# Patient Record
Sex: MALE | Race: WHITE | NOT HISPANIC OR LATINO | Employment: OTHER | URBAN - METROPOLITAN AREA
[De-identification: names, ages, dates, MRNs, and addresses within clinical notes are randomized per-mention and may not be internally consistent; named-entity substitution may affect disease eponyms.]

---

## 2017-04-05 ENCOUNTER — APPOINTMENT (OUTPATIENT)
Dept: LAB | Facility: HOSPITAL | Age: 77
End: 2017-04-05

## 2017-04-12 ENCOUNTER — APPOINTMENT (OUTPATIENT)
Dept: LAB | Facility: HOSPITAL | Age: 77
End: 2017-04-12

## 2017-05-18 ENCOUNTER — OFFICE VISIT (OUTPATIENT)
Dept: FAMILY MEDICINE CLINIC | Facility: CLINIC | Age: 77
End: 2017-05-18

## 2017-05-18 VITALS
WEIGHT: 146.4 LBS | HEART RATE: 72 BPM | HEIGHT: 69 IN | SYSTOLIC BLOOD PRESSURE: 110 MMHG | OXYGEN SATURATION: 93 % | TEMPERATURE: 98.8 F | BODY MASS INDEX: 21.68 KG/M2 | DIASTOLIC BLOOD PRESSURE: 70 MMHG

## 2017-05-18 DIAGNOSIS — R20.2 PARESTHESIA OF LOWER EXTREMITY: Primary | ICD-10-CM

## 2017-05-18 DIAGNOSIS — H61.23 BILATERAL IMPACTED CERUMEN: ICD-10-CM

## 2017-05-18 DIAGNOSIS — Z23 IMMUNIZATION DUE: ICD-10-CM

## 2017-05-18 LAB
ANION GAP SERPL CALCULATED.3IONS-SCNC: 13.2 MMOL/L
BUN BLD-MCNC: 17 MG/DL (ref 8–23)
BUN/CREAT SERPL: 20 (ref 7–25)
CALCIUM SPEC-SCNC: 10 MG/DL (ref 8.6–10.5)
CHLORIDE SERPL-SCNC: 97 MMOL/L (ref 98–107)
CO2 SERPL-SCNC: 25.8 MMOL/L (ref 22–29)
CREAT BLD-MCNC: 0.85 MG/DL (ref 0.76–1.27)
GFR SERPL CREATININE-BSD FRML MDRD: 88 ML/MIN/1.73
GLUCOSE BLD-MCNC: 86 MG/DL (ref 65–99)
POTASSIUM BLD-SCNC: 5 MMOL/L (ref 3.5–5.2)
SODIUM BLD-SCNC: 136 MMOL/L (ref 136–145)
TSH SERPL DL<=0.05 MIU/L-ACNC: 1.72 MIU/ML (ref 0.27–4.2)
VIT B12 BLD-MCNC: 461 PG/ML (ref 211–946)

## 2017-05-18 PROCEDURE — 82607 VITAMIN B-12: CPT | Performed by: INTERNAL MEDICINE

## 2017-05-18 PROCEDURE — 80048 BASIC METABOLIC PNL TOTAL CA: CPT | Performed by: INTERNAL MEDICINE

## 2017-05-18 PROCEDURE — 99213 OFFICE O/P EST LOW 20 MIN: CPT | Performed by: INTERNAL MEDICINE

## 2017-05-18 PROCEDURE — 69209 REMOVE IMPACTED EAR WAX UNI: CPT | Performed by: INTERNAL MEDICINE

## 2017-05-18 PROCEDURE — 84443 ASSAY THYROID STIM HORMONE: CPT | Performed by: INTERNAL MEDICINE

## 2017-05-18 RX ORDER — GABAPENTIN 100 MG/1
100 CAPSULE ORAL 3 TIMES DAILY
Qty: 90 CAPSULE | Refills: 0 | Status: SHIPPED | OUTPATIENT
Start: 2017-05-18 | End: 2017-06-14 | Stop reason: SDUPTHER

## 2017-05-19 ENCOUNTER — TELEPHONE (OUTPATIENT)
Dept: FAMILY MEDICINE CLINIC | Facility: CLINIC | Age: 77
End: 2017-05-19

## 2017-06-15 RX ORDER — GABAPENTIN 100 MG/1
CAPSULE ORAL
Qty: 90 CAPSULE | Refills: 0 | Status: SHIPPED | OUTPATIENT
Start: 2017-06-15 | End: 2018-07-12

## 2017-09-19 ENCOUNTER — TELEPHONE (OUTPATIENT)
Dept: FAMILY MEDICINE CLINIC | Facility: CLINIC | Age: 77
End: 2017-09-19

## 2017-09-19 RX ORDER — PREGABALIN 50 MG/1
50 CAPSULE ORAL 3 TIMES DAILY
Qty: 42 CAPSULE | Refills: 0 | Status: SHIPPED | OUTPATIENT
Start: 2017-09-19 | End: 2017-10-02 | Stop reason: SDUPTHER

## 2017-09-19 NOTE — TELEPHONE ENCOUNTER
Patient here accompanying wife not being seen today formally did get a trial of Neurontin for his peripheral neuropathy mainly lower extremities he did not do anything S4s factors goes has not tried Lyrica we have [facility] prescription for quantity 42 with coupon making free trial for patient with a Navdeep and contract.  This works we'll initiate prescription with a formal urine drug screen also.

## 2018-07-12 ENCOUNTER — TELEPHONE (OUTPATIENT)
Dept: FAMILY MEDICINE CLINIC | Facility: CLINIC | Age: 78
End: 2018-07-12

## 2018-07-12 ENCOUNTER — OFFICE VISIT (OUTPATIENT)
Dept: FAMILY MEDICINE CLINIC | Facility: CLINIC | Age: 78
End: 2018-07-12

## 2018-07-12 VITALS
WEIGHT: 155.2 LBS | TEMPERATURE: 97.9 F | OXYGEN SATURATION: 97 % | HEART RATE: 71 BPM | DIASTOLIC BLOOD PRESSURE: 64 MMHG | HEIGHT: 69 IN | BODY MASS INDEX: 22.99 KG/M2 | SYSTOLIC BLOOD PRESSURE: 118 MMHG | RESPIRATION RATE: 16 BRPM

## 2018-07-12 DIAGNOSIS — R06.02 SOB (SHORTNESS OF BREATH): Primary | ICD-10-CM

## 2018-07-12 DIAGNOSIS — F41.9 ANXIETY: ICD-10-CM

## 2018-07-12 LAB
ALBUMIN SERPL-MCNC: 4.5 G/DL (ref 3.5–5.2)
ALBUMIN/GLOB SERPL: 1.1 G/DL
ALP SERPL-CCNC: 85 U/L (ref 39–117)
ALT SERPL W P-5'-P-CCNC: 10 U/L (ref 1–41)
ANION GAP SERPL CALCULATED.3IONS-SCNC: 13.7 MMOL/L
AST SERPL-CCNC: 9 U/L (ref 1–40)
BILIRUB SERPL-MCNC: 0.6 MG/DL (ref 0.1–1.2)
BUN BLD-MCNC: 15 MG/DL (ref 8–23)
BUN/CREAT SERPL: 16.3 (ref 7–25)
CALCIUM SPEC-SCNC: 9.3 MG/DL (ref 8.6–10.5)
CHLORIDE SERPL-SCNC: 102 MMOL/L (ref 98–107)
CO2 SERPL-SCNC: 26.3 MMOL/L (ref 22–29)
CREAT BLD-MCNC: 0.92 MG/DL (ref 0.76–1.27)
ERYTHROCYTE [DISTWIDTH] IN BLOOD BY AUTOMATED COUNT: 13.9 % (ref 4.5–15)
GFR SERPL CREATININE-BSD FRML MDRD: 80 ML/MIN/1.73
GLOBULIN UR ELPH-MCNC: 4.2 GM/DL
GLUCOSE BLD-MCNC: 84 MG/DL (ref 65–99)
HCT VFR BLD AUTO: 48.3 % (ref 35–60)
HGB BLD-MCNC: 16.3 G/DL (ref 13.5–18)
LYMPHOCYTES # BLD AUTO: 2.8 10*3/MM3 (ref 1.2–3.4)
LYMPHOCYTES NFR BLD AUTO: 36.9 % (ref 21–51)
MCH RBC QN AUTO: 30.3 PG (ref 26.1–33.1)
MCHC RBC AUTO-ENTMCNC: 33.9 G/DL (ref 33–37)
MCV RBC AUTO: 89.5 FL (ref 80–99)
MONOCYTES # BLD AUTO: 0.7 10*3/MM3 (ref 0.1–0.6)
MONOCYTES NFR BLD AUTO: 9.4 % (ref 2–9)
NEUTROPHILS # BLD AUTO: 4.1 10*3/MM3 (ref 1.4–6.5)
NEUTROPHILS NFR BLD AUTO: 53.7 % (ref 42–75)
PLATELET # BLD AUTO: 278 10*3/MM3 (ref 150–450)
PMV BLD AUTO: 7.1 FL (ref 7.1–10.5)
POTASSIUM BLD-SCNC: 4.7 MMOL/L (ref 3.5–5.2)
PROT SERPL-MCNC: 8.7 G/DL (ref 6–8.5)
RBC # BLD AUTO: 5.39 10*6/MM3 (ref 4–6)
SODIUM BLD-SCNC: 142 MMOL/L (ref 136–145)
TSH SERPL DL<=0.05 MIU/L-ACNC: 1.6 MIU/ML (ref 0.27–4.2)
WBC NRBC COR # BLD: 7.6 10*3/MM3 (ref 4.5–10)

## 2018-07-12 PROCEDURE — 80053 COMPREHEN METABOLIC PANEL: CPT | Performed by: NURSE PRACTITIONER

## 2018-07-12 PROCEDURE — 84443 ASSAY THYROID STIM HORMONE: CPT | Performed by: NURSE PRACTITIONER

## 2018-07-12 PROCEDURE — 36415 COLL VENOUS BLD VENIPUNCTURE: CPT | Performed by: NURSE PRACTITIONER

## 2018-07-12 PROCEDURE — 93000 ELECTROCARDIOGRAM COMPLETE: CPT | Performed by: NURSE PRACTITIONER

## 2018-07-12 PROCEDURE — 99213 OFFICE O/P EST LOW 20 MIN: CPT | Performed by: NURSE PRACTITIONER

## 2018-07-12 PROCEDURE — 85025 COMPLETE CBC W/AUTO DIFF WBC: CPT | Performed by: NURSE PRACTITIONER

## 2018-07-12 RX ORDER — HYDROXYZINE HYDROCHLORIDE 25 MG/1
25 TABLET, FILM COATED ORAL EVERY 8 HOURS PRN
Qty: 30 TABLET | Refills: 1 | Status: SHIPPED | OUTPATIENT
Start: 2018-07-12 | End: 2018-08-01 | Stop reason: SDUPTHER

## 2018-07-12 RX ORDER — FLUOXETINE 10 MG/1
10 TABLET, FILM COATED ORAL DAILY
Qty: 30 TABLET | Refills: 2 | Status: SHIPPED | OUTPATIENT
Start: 2018-07-12 | End: 2018-08-01

## 2018-07-12 NOTE — PATIENT INSTRUCTIONS
ekg today WNL>   start prozac 10mg daily. Prev tolerated.   Labs today will call with results.  Trial hydroxyzine 25mg every 8 hours as needed for anxiety. Educated about sedation.   If any worsening symptoms advised to go to the ER.   F/u if symptoms persist.   Increase fluid intake, get plenty of rest.   Patient agrees with plan of care and understands instructions. Call if worsening symptoms or any problems or concerns.

## 2018-07-12 NOTE — PROGRESS NOTES
Procedure     ECG 12 Lead  Date/Time: 7/12/2018 12:11 PM  Performed by: CARRIE JETT  Authorized by: CARRIE JETT   Comparison: compared with previous ECG from 10/6/2010  Comparison to previous ECG: nsr  Rhythm: sinus rhythm  Rate: normal  QRS axis: normal  Clinical impression: normal ECG

## 2018-07-12 NOTE — PROGRESS NOTES
Subjective   Jayden Marquez is a 78 y.o. male.     History of Present Illness   C/o SOA, started about 4-5 days ago, he thinks related to anxiety, states anxiety and SOA feeling is all the time, he states he feels SOA currently, feels like he can't take a deep breath, he denies CP, denies cardiac history, he does smoke 4-5 cigarettes per day. He denies cough, denies wheezing, he states he has been anxious, denies stress, he states he woke up feeling anxious and SOB 4-5 days ago, he states he has had this feeling before. He used to take prozac and xanax for anxiety, does not take anymore, stopped 2-3 years ago. 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 Over due for labs.     The following portions of the patient's history were reviewed and updated as appropriate: allergies, current medications, past family history, past medical history, past social history, past surgical history and problem list.    Review of Systems   Constitutional: Negative for chills, diaphoresis and fever.   HENT: Negative for congestion and sinus pressure.    Respiratory: Negative for cough, chest tightness, shortness of breath and wheezing.    Cardiovascular: Negative for chest pain, palpitations and leg swelling.   Musculoskeletal: Negative for arthralgias and myalgias.   Skin: Negative for pallor.   Neurological: Negative for dizziness, light-headedness and headache.   Psychiatric/Behavioral: Negative for agitation, sleep disturbance, suicidal ideas, depressed mood and stress. The patient is nervous/anxious.    All other systems reviewed and are negative.      Objective   Physical Exam   Constitutional: He is oriented to person, place, and time. He appears well-developed and well-nourished.   HENT:   Head: Normocephalic.   Eyes: Pupils are equal, round, and reactive to light.   Neck: Normal range of motion.   Cardiovascular: Normal rate, regular rhythm, normal heart sounds and intact distal pulses.    Pulses:       Radial pulses are 2+  on the right side, and 2+ on the left side.        Dorsalis pedis pulses are 2+ on the right side, and 2+ on the left side.        Posterior tibial pulses are 2+ on the right side, and 2+ on the left side.   Pulmonary/Chest: Effort normal and breath sounds normal. No respiratory distress. He has no decreased breath sounds. He has no wheezes. He has no rhonchi. He has no rales.   Musculoskeletal: Normal range of motion.   Lymphadenopathy:     He has no cervical adenopathy.   Neurological: He is alert and oriented to person, place, and time.   Skin: Skin is warm and dry.   Psychiatric: He has a normal mood and affect. His behavior is normal.   Nursing note and vitals reviewed.        Assessment/Plan   Jayden was seen today for shortness of breath and anxiety.    Diagnoses and all orders for this visit:    SOB (shortness of breath)  -     Comprehensive Metabolic Panel  -     CBC & Differential  -     TSH  -     ECG 12 Lead  -     CBC Auto Differential    Anxiety  -     Comprehensive Metabolic Panel  -     CBC & Differential  -     TSH  -     CBC Auto Differential    Other orders  -     hydrOXYzine (ATARAX) 25 MG tablet; Take 1 tablet by mouth Every 8 (Eight) Hours As Needed for Anxiety.  -     FLUoxetine (PROzac) 10 MG tablet; Take 1 tablet by mouth Daily.          ekg today WNL>   start prozac 10mg daily. Prev tolerated.   Labs today will call with results.  Trial hydroxyzine 25mg every 8 hours as needed for anxiety. Educated about sedation.   If any worsening symptoms advised to go to the ER.   F/u if symptoms persist.   Increase fluid intake, get plenty of rest.   Patient agrees with plan of care and understands instructions. Call if worsening symptoms or any problems or concerns.

## 2018-07-12 NOTE — TELEPHONE ENCOUNTER
----- Message from RAYMOND Gonsales sent at 7/12/2018  2:32 PM EDT -----  Please call patient with results. Thyroid normal, BS, kidney and liver func is normal. Blood count is normal.    Pt informed of lab results

## 2018-08-01 ENCOUNTER — OFFICE VISIT (OUTPATIENT)
Dept: FAMILY MEDICINE CLINIC | Facility: CLINIC | Age: 78
End: 2018-08-01

## 2018-08-01 VITALS
TEMPERATURE: 97.5 F | SYSTOLIC BLOOD PRESSURE: 130 MMHG | DIASTOLIC BLOOD PRESSURE: 72 MMHG | HEART RATE: 102 BPM | WEIGHT: 153.2 LBS | BODY MASS INDEX: 22.69 KG/M2 | HEIGHT: 69 IN | OXYGEN SATURATION: 95 %

## 2018-08-01 DIAGNOSIS — F41.9 ANXIETY: Primary | ICD-10-CM

## 2018-08-01 PROCEDURE — 99213 OFFICE O/P EST LOW 20 MIN: CPT | Performed by: NURSE PRACTITIONER

## 2018-08-01 RX ORDER — HYDROXYZINE HYDROCHLORIDE 25 MG/1
25 TABLET, FILM COATED ORAL EVERY 8 HOURS PRN
Qty: 30 TABLET | Refills: 6 | Status: SHIPPED | OUTPATIENT
Start: 2018-08-01 | End: 2022-12-30

## 2018-08-01 RX ORDER — FLUOXETINE HYDROCHLORIDE 20 MG/1
20 CAPSULE ORAL DAILY
Qty: 30 CAPSULE | Refills: 6 | Status: SHIPPED | OUTPATIENT
Start: 2018-08-01 | End: 2019-02-08 | Stop reason: SDUPTHER

## 2018-08-01 NOTE — PATIENT INSTRUCTIONS
Take prozac 10mg daily x1 week, then increase to 20mg daily,   Cont hydroxyzine as needed at onset of anxiety.   Encouraged deep breathing, meditation, sleep hygiene, exercise as tolerated.   List of counselors given he will call for appt.   F/u in about 4-6 weeks.  Increase fluid intake, get plenty of rest.   Patient agrees with plan of care and understands instructions. Call if worsening symptoms or any problems or concerns.

## 2018-08-01 NOTE — PROGRESS NOTES
Subjective   Jayden Marquez is a 78 y.o. male.     History of Present Illness   Here today to f/u for anxiety, he was seen on 18 for anxiety, restarted on prozac 10mg daily, also trialed hydroxyzine 25mg as needed for anxiety, had labs on 18 WNL. He states he is still taking prozac, he states hydroxyzine has helped some, he states this has helped some. He still has some anxiety. He states he has increased prozac to 20mg daily, taking old rx from home, unsure if  or not. Denies suicidal ideations, states he has panic daily, he gets SOA with attacks.     The following portions of the patient's history were reviewed and updated as appropriate: allergies, current medications, past family history, past medical history, past social history, past surgical history and problem list.    Review of Systems   Constitutional: Negative for chills, diaphoresis and fever.   Respiratory: Negative for cough and shortness of breath.    Cardiovascular: Negative for chest pain.   Musculoskeletal: Negative for arthralgias and myalgias.   Skin: Negative for rash.   Neurological: Negative for dizziness, light-headedness and headache.   Psychiatric/Behavioral: Negative for behavioral problems, decreased concentration, dysphoric mood, sleep disturbance, suicidal ideas, depressed mood and stress. The patient is nervous/anxious.    All other systems reviewed and are negative.      Objective   Physical Exam   Constitutional: He is oriented to person, place, and time. He appears well-developed and well-nourished.   HENT:   Head: Normocephalic.   Eyes: Pupils are equal, round, and reactive to light.   Neck: Normal range of motion.   Cardiovascular: Normal rate, regular rhythm and normal heart sounds.    Pulmonary/Chest: Effort normal and breath sounds normal.   Musculoskeletal: Normal range of motion.   Lymphadenopathy:     He has no cervical adenopathy.   Neurological: He is alert and oriented to person, place, and time.   Skin:  Skin is warm and dry.   Psychiatric: His speech is normal and behavior is normal. Judgment and thought content normal. His mood appears anxious. Cognition and memory are normal. He expresses no suicidal ideation.   Nursing note and vitals reviewed.        Assessment/Plan   Jayden was seen today for anxiety.    Diagnoses and all orders for this visit:    Anxiety    Other orders  -     FLUoxetine (PROZAC) 20 MG capsule; Take 1 capsule by mouth Daily.  -     hydrOXYzine (ATARAX) 25 MG tablet; Take 1 tablet by mouth Every 8 (Eight) Hours As Needed for Anxiety.      Take prozac 10mg daily x1 week, then increase to 20mg daily,   Cont hydroxyzine as needed at onset of anxiety.   Encouraged deep breathing, meditation, sleep hygiene, exercise as tolerated.   List of counselors given he will call for appt.   F/u in about 4-6 weeks.  Increase fluid intake, get plenty of rest.   Patient agrees with plan of care and understands instructions. Call if worsening symptoms or any problems or concerns.

## 2018-08-06 ENCOUNTER — OFFICE VISIT (OUTPATIENT)
Dept: FAMILY MEDICINE CLINIC | Facility: CLINIC | Age: 78
End: 2018-08-06

## 2018-08-06 VITALS
HEIGHT: 69 IN | HEART RATE: 97 BPM | BODY MASS INDEX: 22.33 KG/M2 | OXYGEN SATURATION: 95 % | WEIGHT: 150.8 LBS | DIASTOLIC BLOOD PRESSURE: 60 MMHG | TEMPERATURE: 98.3 F | SYSTOLIC BLOOD PRESSURE: 110 MMHG

## 2018-08-06 DIAGNOSIS — F41.0 PANIC DISORDER: Primary | ICD-10-CM

## 2018-08-06 DIAGNOSIS — Z51.81 MEDICATION MONITORING ENCOUNTER: ICD-10-CM

## 2018-08-06 PROCEDURE — 99213 OFFICE O/P EST LOW 20 MIN: CPT | Performed by: INTERNAL MEDICINE

## 2018-08-06 RX ORDER — ALPRAZOLAM 0.25 MG/1
0.25 TABLET ORAL 3 TIMES DAILY PRN
Qty: 21 TABLET | Refills: 0 | Status: SHIPPED | OUTPATIENT
Start: 2018-08-06 | End: 2018-08-14 | Stop reason: SDUPTHER

## 2018-08-06 RX ORDER — ALPRAZOLAM 0.25 MG/1
0.25 TABLET ORAL 3 TIMES DAILY PRN
Qty: 21 TABLET | Refills: 0 | Status: SHIPPED | OUTPATIENT
Start: 2018-08-06 | End: 2018-08-06 | Stop reason: SDUPTHER

## 2018-08-06 NOTE — PROGRESS NOTES
Subjective   Jayden Marquez is a 78 y.o. male.   Patient also having panic attacks has restarted his Prozac it hasn't fully kick in yet feel short of breath when he is having a panic attack.  Hydroxyzine is not effective  History of Present Illness panic attacks recently restarted Prozac hydroxyzine is not helping this point on something else has used Xanax before.  Basis with benefit short of breath that with exertion ongoing these feeling anxious and having panic attack.  No known COPD but was prior smoker half-pack per day for 50 years.  No other complaints this point in time.  We will need to get updated urine drug screening [facility] drug contract agreement initiate when necessary Xanax.  Don't perceive high risk factors does not aware of any family members or friends he might be inclined to steal his medication.  Is benefit discussed      Review of Systems   Respiratory: Positive for shortness of breath.    Psychiatric/Behavioral: The patient is nervous/anxious.    All other systems reviewed and are negative.      Objective   Vitals:    08/06/18 1300   BP: 110/60   Pulse: 97   Temp: 98.3 °F (36.8 °C)   SpO2: 95%   Weight: 68.4 kg (150 lb 12.8 oz)     Physical Exam   Constitutional: He appears well-developed and well-nourished.   HENT:   Head: Normocephalic and atraumatic.   Eyes: Pupils are equal, round, and reactive to light. Conjunctivae are normal.   Cardiovascular: Normal rate, regular rhythm and normal heart sounds.    Pulmonary/Chest: Effort normal and breath sounds normal. No respiratory distress. He has no wheezes. He has no rales.   Nursing note and vitals reviewed.      No results found for: INR    Procedures  Peaks flows  Assessment/Plan   1.  Panic disorder plan continue Prozac and Xanax when necessary.    2.  Medication monitoring  uds l  Drug contract Navdeep done.  Satisfactory Xanax to 0.25 mg quantity 21 one every 8 hours when necessary no refills.    Much of this encounter note is an electronic  transcription/translation of spoken language to printed text.  The electronic translation of spoken language may permit erroneous, or at times, nonsensical words or phrases to be inadvertently transcribed.  Although I have reviewed the note for such errors, some may still exist. If there are questions or for further clarification, please contact me.

## 2018-08-09 LAB — CONV REPORT SUMMARY: NORMAL

## 2018-08-14 RX ORDER — ALPRAZOLAM 0.25 MG/1
TABLET ORAL
Qty: 21 TABLET | Refills: 0 | Status: SHIPPED | OUTPATIENT
Start: 2018-08-14 | End: 2018-08-23 | Stop reason: SDUPTHER

## 2018-08-24 RX ORDER — ALPRAZOLAM 0.25 MG/1
TABLET ORAL
Qty: 21 TABLET | Refills: 0 | Status: SHIPPED | OUTPATIENT
Start: 2018-08-24 | End: 2018-09-21 | Stop reason: SDUPTHER

## 2018-08-30 RX ORDER — ALPRAZOLAM 0.25 MG/1
TABLET ORAL
Qty: 21 TABLET | Refills: 0 | Status: SHIPPED | OUTPATIENT
Start: 2018-08-30 | End: 2018-09-21 | Stop reason: SDUPTHER

## 2018-09-21 ENCOUNTER — OFFICE VISIT (OUTPATIENT)
Dept: FAMILY MEDICINE CLINIC | Facility: CLINIC | Age: 78
End: 2018-09-21

## 2018-09-21 VITALS
OXYGEN SATURATION: 97 % | WEIGHT: 151 LBS | DIASTOLIC BLOOD PRESSURE: 78 MMHG | HEIGHT: 69 IN | SYSTOLIC BLOOD PRESSURE: 140 MMHG | HEART RATE: 80 BPM | TEMPERATURE: 98.3 F | BODY MASS INDEX: 22.36 KG/M2

## 2018-09-21 DIAGNOSIS — F41.1 GENERALIZED ANXIETY DISORDER: Primary | ICD-10-CM

## 2018-09-21 DIAGNOSIS — R53.83 FATIGUE, UNSPECIFIED TYPE: ICD-10-CM

## 2018-09-21 DIAGNOSIS — G25.0 ESSENTIAL TREMOR: ICD-10-CM

## 2018-09-21 LAB
ALBUMIN SERPL-MCNC: 4.6 G/DL (ref 3.5–5.2)
ALBUMIN/GLOB SERPL: 1.2 G/DL
ALP SERPL-CCNC: 90 U/L (ref 39–117)
ALT SERPL W P-5'-P-CCNC: 16 U/L (ref 1–41)
ANION GAP SERPL CALCULATED.3IONS-SCNC: 11.9 MMOL/L
AST SERPL-CCNC: 16 U/L (ref 1–40)
BILIRUB SERPL-MCNC: 0.4 MG/DL (ref 0.1–1.2)
BUN BLD-MCNC: 15 MG/DL (ref 8–23)
BUN/CREAT SERPL: 17.4 (ref 7–25)
CALCIUM SPEC-SCNC: 10.3 MG/DL (ref 8.6–10.5)
CHLORIDE SERPL-SCNC: 102 MMOL/L (ref 98–107)
CO2 SERPL-SCNC: 27.1 MMOL/L (ref 22–29)
CORTIS SERPL-MCNC: 10.93 MCG/DL
CREAT BLD-MCNC: 0.86 MG/DL (ref 0.76–1.27)
GFR SERPL CREATININE-BSD FRML MDRD: 86 ML/MIN/1.73
GLOBULIN UR ELPH-MCNC: 4 GM/DL
GLUCOSE BLD-MCNC: 86 MG/DL (ref 65–99)
POTASSIUM BLD-SCNC: 5 MMOL/L (ref 3.5–5.2)
PROT SERPL-MCNC: 8.6 G/DL (ref 6–8.5)
SODIUM BLD-SCNC: 141 MMOL/L (ref 136–145)

## 2018-09-21 PROCEDURE — 99214 OFFICE O/P EST MOD 30 MIN: CPT | Performed by: INTERNAL MEDICINE

## 2018-09-21 PROCEDURE — 36415 COLL VENOUS BLD VENIPUNCTURE: CPT | Performed by: INTERNAL MEDICINE

## 2018-09-21 PROCEDURE — 82533 TOTAL CORTISOL: CPT | Performed by: INTERNAL MEDICINE

## 2018-09-21 PROCEDURE — 80053 COMPREHEN METABOLIC PANEL: CPT | Performed by: INTERNAL MEDICINE

## 2018-09-21 RX ORDER — ALPRAZOLAM 0.25 MG/1
0.25 TABLET ORAL 3 TIMES DAILY PRN
Qty: 30 TABLET | Refills: 0 | Status: SHIPPED | OUTPATIENT
Start: 2018-09-21 | End: 2018-10-05 | Stop reason: SDUPTHER

## 2018-09-21 RX ORDER — ACETAMINOPHEN,DIPHENHYDRAMINE HCL 500; 25 MG/1; MG/1
1 TABLET, FILM COATED ORAL NIGHTLY PRN
COMMUNITY

## 2018-09-21 RX ORDER — PROPRANOLOL HYDROCHLORIDE 20 MG/1
TABLET ORAL
Qty: 60 TABLET | Refills: 2 | Status: SHIPPED | OUTPATIENT
Start: 2018-09-21 | End: 2019-01-05 | Stop reason: SDUPTHER

## 2018-09-21 NOTE — PROGRESS NOTES
Subjective   Jayden Marquez is a 78 y.o. male.   Patient will generalized anxiety disorder doing well on Xanax.  Is also taken 20 mg of fluoxetine which he also thinks helped takes the edge off.  History of Present Illness   Xanax helps w has been taking daily does need refill on Inderal which is given to him by urologist in the past for probable essential tremor.  Which is dose fairly at a half to whole tablet of 20 mg strength in the morning and at noon.  Does complain of persistent decreased energy and tiredness we have obtained most the labs usually done.  Will consider getting both CMP and a cortisol 7 else, please test negative a consideration for sleep lab evaluation it was not had recently.  He spends a lot of time in bed up to 18 hours a day per spouse  Drug allergy to codeine, patient's.  Is a smoker trying to wean somewhat no alcohol is .  Family history for both cancer and diabetes.  Review of Systems   Psychiatric/Behavioral: The patient is nervous/anxious.    All other systems reviewed and are negative.      Objective   Vitals:    09/21/18 1011   BP: 140/78   Pulse: 80   Temp: 98.3 °F (36.8 °C)   SpO2: 97%   Weight: 68.5 kg (151 lb)     Physical Exam   Constitutional: He appears well-developed and well-nourished.   HENT:   Head: Normocephalic and atraumatic.   Eyes: Pupils are equal, round, and reactive to light. Conjunctivae are normal.   Cardiovascular: Normal rate, regular rhythm and normal heart sounds.    Pulmonary/Chest: Effort normal and breath sounds normal.   Abdominal: Soft. Bowel sounds are normal.   Neurological: He is alert.   Slightly stooped posture but unremarkable gait and station    No cogwheeling noted at elbow wrist or thumb either arm.  Does have mild tremor right arm more so left with outstretched arms.  Overall impression essential tremor more bothersome are   Skin: Skin is warm and dry.   Psychiatric: He has a normal mood and affect. His behavior is normal.   Vitals  reviewed.      No results found for: INR    Procedures    Assessment/Plan   1.  Generalized anxiety disorder plan continue fluoxetine  refill Xanax    2.  Fatigue undefined await pending lab  Further follow-up contingent on pending test  3.  Essential tremor refill Inderal    Much of this encounter note is an electronic transcription/translation of spoken language to printed text.  The electronic translation of spoken language may permit erroneous, or at times, nonsensical words or phrases to be inadvertently transcribed.  Although I have reviewed the note for such errors, some may still exist. If there are questions or for further clarification, please contact me.

## 2018-09-24 ENCOUNTER — LAB (OUTPATIENT)
Dept: FAMILY MEDICINE CLINIC | Facility: CLINIC | Age: 78
End: 2018-09-24

## 2018-09-24 DIAGNOSIS — R77.9 ELEVATED BLOOD PROTEIN: ICD-10-CM

## 2018-09-24 DIAGNOSIS — R77.9 ELEVATED BLOOD PROTEIN: Primary | ICD-10-CM

## 2018-09-24 PROCEDURE — 36415 COLL VENOUS BLD VENIPUNCTURE: CPT | Performed by: INTERNAL MEDICINE

## 2018-09-25 LAB
ALBUMIN SERPL-MCNC: 3.7 G/DL (ref 2.9–4.4)
ALBUMIN/GLOB SERPL: 0.8 {RATIO} (ref 0.7–1.7)
ALPHA1 GLOB FLD ELPH-MCNC: 0.3 G/DL (ref 0–0.4)
ALPHA2 GLOB SERPL ELPH-MCNC: 0.8 G/DL (ref 0.4–1)
B-GLOBULIN SERPL ELPH-MCNC: 1.2 G/DL (ref 0.7–1.3)
GAMMA GLOB SERPL ELPH-MCNC: 2.1 G/DL (ref 0.4–1.8)
GLOBULIN SER CALC-MCNC: 4.4 G/DL (ref 2.2–3.9)
Lab: ABNORMAL
M-SPIKE: 1.1 G/DL
PROT PATTERN SERPL ELPH-IMP: ABNORMAL
PROT SERPL-MCNC: 8.1 G/DL (ref 6–8.5)

## 2018-09-27 DIAGNOSIS — R77.8 ABNORMAL SERUM PROTEIN ELECTROPHORESIS: Primary | ICD-10-CM

## 2018-09-28 ENCOUNTER — LAB (OUTPATIENT)
Dept: FAMILY MEDICINE CLINIC | Facility: CLINIC | Age: 78
End: 2018-09-28

## 2018-09-28 DIAGNOSIS — R77.8 ABNORMAL SERUM PROTEIN ELECTROPHORESIS: ICD-10-CM

## 2018-09-28 DIAGNOSIS — Z51.81 MEDICATION MONITORING ENCOUNTER: ICD-10-CM

## 2018-09-28 PROCEDURE — 36415 COLL VENOUS BLD VENIPUNCTURE: CPT | Performed by: INTERNAL MEDICINE

## 2018-10-01 LAB
IGA SERPL-MCNC: 328 MG/DL (ref 61–437)
IGG SERPL-MCNC: 2104 MG/DL (ref 700–1600)
IGM SERPL-MCNC: 180 MG/DL (ref 15–143)
PROT PATTERN SERPL IFE-IMP: ABNORMAL

## 2018-10-02 LAB — INTERPRETATION UR IFE-IMP: NORMAL

## 2018-10-03 DIAGNOSIS — R77.9 ELEVATED BLOOD PROTEIN: Primary | ICD-10-CM

## 2018-10-05 LAB — CONV REPORT SUMMARY: NORMAL

## 2018-10-08 RX ORDER — ALPRAZOLAM 0.25 MG/1
TABLET ORAL
Qty: 30 TABLET | Refills: 0 | Status: SHIPPED | OUTPATIENT
Start: 2018-10-08 | End: 2018-11-28 | Stop reason: SDUPTHER

## 2018-10-12 RX ORDER — ALPRAZOLAM 0.25 MG/1
TABLET ORAL
Qty: 30 TABLET | Refills: 0 | Status: SHIPPED | OUTPATIENT
Start: 2018-10-12 | End: 2019-11-04 | Stop reason: SDUPTHER

## 2018-11-12 ENCOUNTER — OFFICE VISIT (OUTPATIENT)
Dept: ONCOLOGY | Facility: CLINIC | Age: 78
End: 2018-11-12

## 2018-11-12 ENCOUNTER — LAB (OUTPATIENT)
Dept: LAB | Facility: HOSPITAL | Age: 78
End: 2018-11-12

## 2018-11-12 VITALS
HEIGHT: 68 IN | WEIGHT: 152.2 LBS | HEART RATE: 64 BPM | RESPIRATION RATE: 20 BRPM | TEMPERATURE: 98.5 F | SYSTOLIC BLOOD PRESSURE: 157 MMHG | BODY MASS INDEX: 23.07 KG/M2 | DIASTOLIC BLOOD PRESSURE: 87 MMHG | OXYGEN SATURATION: 95 %

## 2018-11-12 DIAGNOSIS — D47.2 MGUS (MONOCLONAL GAMMOPATHY OF UNKNOWN SIGNIFICANCE): Primary | ICD-10-CM

## 2018-11-12 LAB
ALBUMIN SERPL-MCNC: 4.3 G/DL (ref 3.5–5.2)
ALBUMIN/GLOB SERPL: 0.9 G/DL (ref 1.1–2.4)
ALP SERPL-CCNC: 101 U/L (ref 38–116)
ALT SERPL W P-5'-P-CCNC: 13 U/L (ref 0–41)
ANION GAP SERPL CALCULATED.3IONS-SCNC: 11.1 MMOL/L
AST SERPL-CCNC: 20 U/L (ref 0–40)
BASOPHILS # BLD AUTO: 0.06 10*3/MM3 (ref 0–0.1)
BASOPHILS NFR BLD AUTO: 0.7 % (ref 0–1.1)
BILIRUB SERPL-MCNC: 0.5 MG/DL (ref 0.1–1.2)
BUN BLD-MCNC: 15 MG/DL (ref 6–20)
BUN/CREAT SERPL: 16.1 (ref 7.3–30)
CALCIUM SPEC-SCNC: 9.5 MG/DL (ref 8.5–10.2)
CHLORIDE SERPL-SCNC: 101 MMOL/L (ref 98–107)
CO2 SERPL-SCNC: 26.9 MMOL/L (ref 22–29)
CREAT BLD-MCNC: 0.93 MG/DL (ref 0.7–1.3)
DEPRECATED RDW RBC AUTO: 44.3 FL (ref 37–49)
EOSINOPHIL # BLD AUTO: 0.33 10*3/MM3 (ref 0–0.36)
EOSINOPHIL NFR BLD AUTO: 3.9 % (ref 1–5)
ERYTHROCYTE [DISTWIDTH] IN BLOOD BY AUTOMATED COUNT: 13.5 % (ref 11.7–14.5)
GFR SERPL CREATININE-BSD FRML MDRD: 79 ML/MIN/1.73
GLOBULIN UR ELPH-MCNC: 4.9 GM/DL (ref 1.8–3.5)
GLUCOSE BLD-MCNC: 93 MG/DL (ref 74–124)
HCT VFR BLD AUTO: 49.2 % (ref 40–49)
HGB BLD-MCNC: 16.7 G/DL (ref 13.5–16.5)
IMM GRANULOCYTES # BLD: 0.05 10*3/MM3 (ref 0–0.03)
IMM GRANULOCYTES NFR BLD: 0.6 % (ref 0–0.5)
LYMPHOCYTES # BLD AUTO: 3.12 10*3/MM3 (ref 1–3.5)
LYMPHOCYTES NFR BLD AUTO: 37.2 % (ref 20–49)
MCH RBC QN AUTO: 30.4 PG (ref 27–33)
MCHC RBC AUTO-ENTMCNC: 33.9 G/DL (ref 32–35)
MCV RBC AUTO: 89.6 FL (ref 83–97)
MONOCYTES # BLD AUTO: 1.15 10*3/MM3 (ref 0.25–0.8)
MONOCYTES NFR BLD AUTO: 13.7 % (ref 4–12)
NEUTROPHILS # BLD AUTO: 3.67 10*3/MM3 (ref 1.5–7)
NEUTROPHILS NFR BLD AUTO: 43.9 % (ref 39–75)
NRBC BLD MANUAL-RTO: 0 /100 WBC (ref 0–0)
PLATELET # BLD AUTO: 247 10*3/MM3 (ref 150–375)
PMV BLD AUTO: 9.3 FL (ref 8.9–12.1)
POTASSIUM BLD-SCNC: 5.1 MMOL/L (ref 3.5–4.7)
PROT SERPL-MCNC: 9.2 G/DL (ref 6.3–8)
RBC # BLD AUTO: 5.49 10*6/MM3 (ref 4.3–5.5)
SODIUM BLD-SCNC: 139 MMOL/L (ref 134–145)
WBC NRBC COR # BLD: 8.38 10*3/MM3 (ref 4–10)

## 2018-11-12 PROCEDURE — 80053 COMPREHEN METABOLIC PANEL: CPT | Performed by: INTERNAL MEDICINE

## 2018-11-12 PROCEDURE — 85025 COMPLETE CBC W/AUTO DIFF WBC: CPT | Performed by: INTERNAL MEDICINE

## 2018-11-12 PROCEDURE — 36416 COLLJ CAPILLARY BLOOD SPEC: CPT | Performed by: INTERNAL MEDICINE

## 2018-11-12 PROCEDURE — 36415 COLL VENOUS BLD VENIPUNCTURE: CPT | Performed by: INTERNAL MEDICINE

## 2018-11-12 PROCEDURE — 99214 OFFICE O/P EST MOD 30 MIN: CPT | Performed by: INTERNAL MEDICINE

## 2018-11-12 NOTE — PROGRESS NOTES
Subjective .     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 year old male who is here for follow-up with the above-mentioned history.    History of Present Illness   patient is a 76-year-old gentleman who had elevated serum protein  9.5, as a result patient was referred here.  Patient underwent a complete workup, her IgG level is 2023 and she has a monoclonal protein in the serum IgG lambda.  We obtained a 24-hour urine and that was negative.  A schedule survey was obtained and that's negative.  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    And had a CT scan of the chest done 10/18/2016 in order to follow-up on tiny nodules seen in the chest dated the CT abdomen pelvis.  As a result of the CT chest shows moderate emphysema with associated scarring but only reactive small mediastinal lymph nodes but no abnormality seen.    Interval history: Patient has IgG level which is elevated.  He was last seen 2 years ago.  He was referred back by Dr. Anaya to follow-up on the IgG monoclonal protein.  His totally asymptomatic.  He is not anemic.  There is no renal insufficiency.  Calcium is normal.        ONCOLOGIC HISTORY:      patient is a 76 year old gentleman who has been referred here by Dr. Anaya as on his chemistries he serum protein at 9.5. Patient does state that he lost In the last 2-3 months He denies any back pain. He does have some mild fatigue. There is no chest pain ,shortness of breath.no evidence of cough. Dr. Anaya had ordered a CT AND IT WAS NEGATIVE. Patient had chest x-ray in the Past which was negative.    Patient was referred here to evaluate monoclonal gammopathy.  She had complete workup.  her IgG level is 2023 and she has a monoclonal protein in the serum IgG lambda.  We obtained a 24-hour urine and that was negative.  A schedule survey was obtained and that's negative.  So I believe she has got MGUS  IgG lambda.  Beta-2 microglobulin is 1.9.  LDH is normal.  Patient is not anemic.  There is no renal insufficiency.      ALLERGIES:     Allergies   Allergen Reactions   • Codeine        Social History     Socioeconomic History   • Marital status:      Spouse name: Gracia   • Number of children: Not on file   • Years of education: Not on file   • Highest education level: Not on file   Social Needs   • Financial resource strain: Not on file   • Food insecurity - worry: Not on file   • Food insecurity - inability: Not on file   • Transportation needs - medical: Not on file   • Transportation needs - non-medical: Not on file   Occupational History     Employer: RETIRED   Tobacco Use   • Smoking status: Current Every Day Smoker     Packs/day: 0.50     Years: 50.00     Pack years: 25.00     Types: Cigarettes   • Smokeless tobacco: Never Used   • Tobacco comment: will wean not want med   Substance and Sexual Activity   • Alcohol use: No   • Drug use: No   • Sexual activity: Yes   Other Topics Concern   • Not on file   Social History Narrative   • Not on file         Cancer-related family history includes Cancer in his mother.     Review of Systems   Constitutional: Negative for appetite change, chills, diaphoresis, fatigue, fever and unexpected weight change.   HENT: Negative for hearing loss, sore throat and trouble swallowing.    Respiratory: Negative for cough, chest tightness, shortness of breath and wheezing.    Cardiovascular: Negative for chest pain, palpitations and leg swelling.   Gastrointestinal: Negative for abdominal distention, abdominal pain, constipation, diarrhea, nausea and vomiting.   Genitourinary: Negative for dysuria, frequency, hematuria and urgency.   Musculoskeletal: Negative for joint swelling.        No muscle weakness.   Skin: Negative for rash and wound.   Neurological: Negative for seizures, syncope, speech difficulty, weakness, numbness and headaches.   Hematological: Negative for  "adenopathy. Does not bruise/bleed easily.   Psychiatric/Behavioral: Negative for behavioral problems, confusion and suicidal ideas.     A comprehensive 14 point review of systems was performed and was negative except as mentioned.    Objective      Vitals:    11/12/18 1312   BP: 157/87   Pulse: 64   Resp: 20   Temp: 98.5 °F (36.9 °C)   TempSrc: Oral   SpO2: 95%   Weight: 69 kg (152 lb 3.2 oz)   Height: 172.7 cm (68\")   PainSc: 0-No pain     Current Status 11/12/2018   ECOG score 0       Physical Exam      GENERAL:  Well-developed, well-nourished in no acute distress.   SKIN:  Warm, dry without rashes, purpura or petechiae.  EYES:  Pupils equal, round and reactive to light.  EOMs intact.  Conjunctivae normal.  EARS:  Hearing intact.  NOSE:  Septum midline.  No excoriations or nasal discharge.  MOUTH:  Tongue is well-papillated; no stomatitis or ulcers.  Lips normal.  THROAT:  Oropharynx without lesions or exudates.  NECK:  Supple with good range of motion; no thyromegaly or masses, no JVD.  LYMPHATICS:  No cervical, supraclavicular, axillary or inguinal adenopathy.  CHEST:  Lungs clear to auscultation. Good airflow.  CARDIAC:  Regular rate and rhythm without murmurs, rubs or gallops. Normal S1,S2.  ABDOMEN:  Soft, nontender with no hepatosplenomegaly or masses.  EXTREMITIES:  No clubbing, cyanosis or edema.  NEUROLOGICAL:  Cranial Nerves II-XII grossly intact.  No focal neurological deficits.  PSYCHIATRIC:  Normal affect and mood.       RECENT LABS:  Hematology WBC   Date Value Ref Range Status   11/12/2018 8.38 4.00 - 10.00 10*3/mm3 Final     RBC   Date Value Ref Range Status   11/12/2018 5.49 4.30 - 5.50 10*6/mm3 Final     Hemoglobin   Date Value Ref Range Status   11/12/2018 16.7 (H) 13.5 - 16.5 g/dL Final     Hematocrit   Date Value Ref Range Status   11/12/2018 49.2 (H) 40.0 - 49.0 % Final     Platelets   Date Value Ref Range Status   11/12/2018 247 150 - 375 10*3/mm3 Final        Assessment/Plan     1. Elevated " protein with significant weight loss. Patient was found to have high protein of On serum. He has been referred here to maribell LYLE. Patient is not anemic currently. However we obtained  SPEP and quantitative immune , serum immunofixation, serum protein electrophoresis and 24-hour urine for urine protien electrophoresis. We  obtained a skeletal survey.    Patient skeletal survey is negative.  Patient has IgG lambda monoclonal protein in the serum.  Peripheral urine is negative.  There is no evidence of any anemia.  Today her renal function is normal.  We will follow with observation in 6 months with labs the week prior.    Patient was seen more than 2 years ago here.  His protein level is stable.  We will repeat his serum protein electrophoresis, quantitative immunoglobulin serum immunofixation, 24 urine for urine protein electrophoresis and urinary light chains and bone survey.  Nurse practitioner in 5 weeks to discuss with me the results and I'll see him back in 4 months with labs    Kaley Olvera MD

## 2018-11-14 LAB
ALBUMIN SERPL-MCNC: 4.3 G/DL (ref 2.9–4.4)
ALBUMIN/GLOB SERPL: 1 {RATIO} (ref 0.7–1.7)
ALPHA1 GLOB FLD ELPH-MCNC: 0.2 G/DL (ref 0–0.4)
ALPHA2 GLOB SERPL ELPH-MCNC: 0.8 G/DL (ref 0.4–1)
B-GLOBULIN SERPL ELPH-MCNC: 1.1 G/DL (ref 0.7–1.3)
GAMMA GLOB SERPL ELPH-MCNC: 2.3 G/DL (ref 0.4–1.8)
GLOBULIN SER CALC-MCNC: 4.4 G/DL (ref 2.2–3.9)
IGA SERPL-MCNC: 366 MG/DL (ref 61–437)
IGG SERPL-MCNC: 2386 MG/DL (ref 700–1600)
IGM SERPL-MCNC: 203 MG/DL (ref 15–143)
INTERPRETATION SERPL IEP-IMP: ABNORMAL
KAPPA LC SERPL-MCNC: 34.3 MG/L (ref 3.3–19.4)
KAPPA LC/LAMBDA SER: 0.95 {RATIO} (ref 0.26–1.65)
LAMBDA LC FREE SERPL-MCNC: 36.1 MG/L (ref 5.7–26.3)
Lab: ABNORMAL
M-SPIKE: 1 G/DL
PROT SERPL-MCNC: 8.7 G/DL (ref 6–8.5)

## 2018-11-29 RX ORDER — ALPRAZOLAM 0.25 MG/1
TABLET ORAL
Qty: 30 TABLET | Refills: 0 | Status: SHIPPED | OUTPATIENT
Start: 2018-11-29 | End: 2018-12-14 | Stop reason: SDUPTHER

## 2018-12-17 ENCOUNTER — APPOINTMENT (OUTPATIENT)
Dept: LAB | Facility: HOSPITAL | Age: 78
End: 2018-12-17

## 2018-12-17 RX ORDER — ALPRAZOLAM 0.25 MG/1
TABLET ORAL
Qty: 30 TABLET | Refills: 0 | Status: SHIPPED | OUTPATIENT
Start: 2018-12-17 | End: 2019-01-09 | Stop reason: SDUPTHER

## 2019-01-07 RX ORDER — PROPRANOLOL HYDROCHLORIDE 20 MG/1
TABLET ORAL
Qty: 180 TABLET | Refills: 0 | Status: SHIPPED | OUTPATIENT
Start: 2019-01-07 | End: 2019-05-23 | Stop reason: SDUPTHER

## 2019-01-07 RX ORDER — PROPRANOLOL HYDROCHLORIDE 20 MG/1
TABLET ORAL
Qty: 60 TABLET | Refills: 0 | Status: SHIPPED | OUTPATIENT
Start: 2019-01-07 | End: 2019-01-07 | Stop reason: SDUPTHER

## 2019-01-10 RX ORDER — ALPRAZOLAM 0.25 MG/1
TABLET ORAL
Qty: 30 TABLET | Refills: 0 | Status: SHIPPED | OUTPATIENT
Start: 2019-01-10 | End: 2019-02-06 | Stop reason: SDUPTHER

## 2019-02-07 RX ORDER — ALPRAZOLAM 0.25 MG/1
TABLET ORAL
Qty: 30 TABLET | Refills: 0 | Status: SHIPPED | OUTPATIENT
Start: 2019-02-07 | End: 2019-03-09 | Stop reason: SDUPTHER

## 2019-02-11 RX ORDER — FLUOXETINE HYDROCHLORIDE 20 MG/1
20 CAPSULE ORAL DAILY
Qty: 30 CAPSULE | Refills: 0 | Status: SHIPPED | OUTPATIENT
Start: 2019-02-11 | End: 2019-02-11 | Stop reason: SDUPTHER

## 2019-02-11 RX ORDER — FLUOXETINE HYDROCHLORIDE 20 MG/1
20 CAPSULE ORAL DAILY
Qty: 90 CAPSULE | Refills: 0 | Status: SHIPPED | OUTPATIENT
Start: 2019-02-11 | End: 2019-05-24 | Stop reason: SDUPTHER

## 2019-03-11 RX ORDER — ALPRAZOLAM 0.25 MG/1
TABLET ORAL
Qty: 30 TABLET | Refills: 0 | Status: SHIPPED | OUTPATIENT
Start: 2019-03-11 | End: 2019-04-16 | Stop reason: SDUPTHER

## 2019-04-16 RX ORDER — ALPRAZOLAM 0.25 MG/1
TABLET ORAL
Qty: 30 TABLET | Refills: 0 | Status: SHIPPED | OUTPATIENT
Start: 2019-04-16 | End: 2019-05-06 | Stop reason: SDUPTHER

## 2019-05-07 RX ORDER — ALPRAZOLAM 0.25 MG/1
TABLET ORAL
Qty: 30 TABLET | Refills: 0 | Status: SHIPPED | OUTPATIENT
Start: 2019-05-07 | End: 2019-05-30 | Stop reason: SDUPTHER

## 2019-05-23 RX ORDER — PROPRANOLOL HYDROCHLORIDE 20 MG/1
TABLET ORAL
Qty: 180 TABLET | Refills: 0 | Status: SHIPPED | OUTPATIENT
Start: 2019-05-23 | End: 2019-07-01

## 2019-05-24 RX ORDER — FLUOXETINE HYDROCHLORIDE 20 MG/1
CAPSULE ORAL
Qty: 90 CAPSULE | Refills: 0 | Status: SHIPPED | OUTPATIENT
Start: 2019-05-24 | End: 2019-08-16 | Stop reason: SDUPTHER

## 2019-05-31 RX ORDER — ALPRAZOLAM 0.25 MG/1
TABLET ORAL
Qty: 30 TABLET | Refills: 0 | Status: SHIPPED | OUTPATIENT
Start: 2019-05-31 | End: 2019-06-27 | Stop reason: SDUPTHER

## 2019-06-28 RX ORDER — ALPRAZOLAM 0.25 MG/1
TABLET ORAL
Qty: 30 TABLET | Refills: 0 | Status: SHIPPED | OUTPATIENT
Start: 2019-06-28 | End: 2019-07-01 | Stop reason: SDUPTHER

## 2019-07-01 ENCOUNTER — OFFICE VISIT (OUTPATIENT)
Dept: FAMILY MEDICINE CLINIC | Facility: CLINIC | Age: 79
End: 2019-07-01

## 2019-07-01 VITALS
HEART RATE: 67 BPM | HEIGHT: 68 IN | WEIGHT: 151 LBS | DIASTOLIC BLOOD PRESSURE: 86 MMHG | SYSTOLIC BLOOD PRESSURE: 140 MMHG | BODY MASS INDEX: 22.88 KG/M2 | OXYGEN SATURATION: 99 % | TEMPERATURE: 98.2 F

## 2019-07-01 DIAGNOSIS — R19.5 LOOSE STOOLS: ICD-10-CM

## 2019-07-01 DIAGNOSIS — F41.9 ANXIETY: ICD-10-CM

## 2019-07-01 DIAGNOSIS — R19.5 MUCUS IN STOOL: Primary | ICD-10-CM

## 2019-07-01 LAB
ANION GAP SERPL CALCULATED.3IONS-SCNC: 12.3 MMOL/L (ref 5–15)
BUN BLD-MCNC: 14 MG/DL (ref 8–23)
BUN/CREAT SERPL: 14.4 (ref 7–25)
CALCIUM SPEC-SCNC: 10.3 MG/DL (ref 8.6–10.5)
CHLORIDE SERPL-SCNC: 104 MMOL/L (ref 98–107)
CO2 SERPL-SCNC: 26.7 MMOL/L (ref 22–29)
CREAT BLD-MCNC: 0.97 MG/DL (ref 0.76–1.27)
ERYTHROCYTE [DISTWIDTH] IN BLOOD BY AUTOMATED COUNT: 13.3 % (ref 12.3–15.4)
GFR SERPL CREATININE-BSD FRML MDRD: 75 ML/MIN/1.73
GLUCOSE BLD-MCNC: 99 MG/DL (ref 65–99)
HCT VFR BLD AUTO: 48 % (ref 37.5–51)
HGB BLD-MCNC: 15.9 G/DL (ref 13–17.7)
LYMPHOCYTES # BLD AUTO: 2.7 10*3/MM3 (ref 0.7–3.1)
LYMPHOCYTES NFR BLD AUTO: 34.7 % (ref 19.6–45.3)
MCH RBC QN AUTO: 30.4 PG (ref 26.6–33)
MCHC RBC AUTO-ENTMCNC: 33.1 G/DL (ref 31.5–35.7)
MCV RBC AUTO: 91.8 FL (ref 79–97)
MONOCYTES # BLD AUTO: 0.8 10*3/MM3 (ref 0.1–0.9)
MONOCYTES NFR BLD AUTO: 9.7 % (ref 5–12)
NEUTROPHILS # BLD AUTO: 4.4 10*3/MM3 (ref 1.7–7)
NEUTROPHILS NFR BLD AUTO: 55.6 % (ref 42.7–76)
PLATELET # BLD AUTO: 272 10*3/MM3 (ref 140–450)
PMV BLD AUTO: 7.3 FL (ref 6–12)
POTASSIUM BLD-SCNC: 5.3 MMOL/L (ref 3.5–5.2)
RBC # BLD AUTO: 5.23 10*6/MM3 (ref 4.14–5.8)
SODIUM BLD-SCNC: 143 MMOL/L (ref 136–145)
WBC NRBC COR # BLD: 7.9 10*3/MM3 (ref 3.4–10.8)

## 2019-07-01 PROCEDURE — 80048 BASIC METABOLIC PNL TOTAL CA: CPT | Performed by: INTERNAL MEDICINE

## 2019-07-01 PROCEDURE — 36415 COLL VENOUS BLD VENIPUNCTURE: CPT | Performed by: INTERNAL MEDICINE

## 2019-07-01 PROCEDURE — 99213 OFFICE O/P EST LOW 20 MIN: CPT | Performed by: INTERNAL MEDICINE

## 2019-07-01 PROCEDURE — 85025 COMPLETE CBC W/AUTO DIFF WBC: CPT | Performed by: INTERNAL MEDICINE

## 2019-07-01 NOTE — PROGRESS NOTES
Subjective   Jayden Marquez is a 79 y.o. male.   Patient with recent mucus in stool as well as loose stools.  History of Present Illness   As above with mucus in stools having loose stools normally 1 bowel monitor day now having 3-4 there is somewhat loose also needs refill on his anxiety medicine he does state he gave this to the pharmacy Saturday for refill but do not see that propping up in our records at this point.    Review of Systems   Gastrointestinal: Positive for abdominal pain.   Genitourinary: Enuresis:                              Frequency:            Musculoskeletal: Negative for back pain (  ).   Psychiatric/Behavioral: Positive for behavioral problems. The patient is nervous/anxious.    All other systems reviewed and are negative.      Objective   Vitals:    07/01/19 1350   BP: 140/86   Pulse: 67   Temp: 98.2 °F (36.8 °C)   SpO2: 99%   Weight: 68.5 kg (151 lb)     Physical Exam   Constitutional: He appears well-developed and well-nourished.   HENT:   Head: Normocephalic and atraumatic.   Eyes: Conjunctivae are normal. Pupils are equal, round, and reactive to light.   Cardiovascular: Normal rate, regular rhythm and normal heart sounds.   Pulmonary/Chest: Effort normal and breath sounds normal.   Abdominal: Soft. Bowel sounds are normal.   Skin: Skin is warm and dry.   Nursing note and vitals reviewed.      No results found for: INR    Procedures    Assessment/Plan   1.  Mucus in stool    2.  Loose stools plan for these we will get stool for O&P, enteric's, C. difficile for the findings if test negative get GI consult    3.  Anxiety disorder check on status refill of Xanax.  Much of this encounter note is an electronic transcription/translation of spoken language to printed text.  The electronic translation of spoken language may permit erroneous, or at times, nonsensical words or phrases to be inadvertently transcribed.  Although I have reviewed the note for such errors, some may still exist. If  there are questions or for further clarification, please contact me.

## 2019-07-02 DIAGNOSIS — E87.5 HYPERKALEMIA: Primary | ICD-10-CM

## 2019-07-04 LAB — C DIFF TOX A+B STL QL IA: NEGATIVE

## 2019-07-05 ENCOUNTER — TELEPHONE (OUTPATIENT)
Dept: FAMILY MEDICINE CLINIC | Facility: CLINIC | Age: 79
End: 2019-07-05

## 2019-07-05 LAB
O+P SPEC MICRO: NORMAL
OVA + PARASITE RESULT 1: NORMAL

## 2019-07-05 NOTE — TELEPHONE ENCOUNTER
Pt informed----- Message from Jett Anaya Jr., MD sent at 7/4/2019  4:19 PM EDT -----  These 2 tests negative

## 2019-07-06 LAB
CAMPYLOBACTER STL CULT: NORMAL
E COLI SXT STL QL IA: NEGATIVE
Lab: NORMAL
Lab: NORMAL
SALM + SHIG STL CULT: NORMAL

## 2019-07-08 ENCOUNTER — TELEPHONE (OUTPATIENT)
Dept: FAMILY MEDICINE CLINIC | Facility: CLINIC | Age: 79
End: 2019-07-08

## 2019-07-08 ENCOUNTER — LAB (OUTPATIENT)
Dept: FAMILY MEDICINE CLINIC | Facility: CLINIC | Age: 79
End: 2019-07-08

## 2019-07-08 DIAGNOSIS — E87.5 HYPERKALEMIA: ICD-10-CM

## 2019-07-08 DIAGNOSIS — R19.5 MUCUS IN STOOL: Primary | ICD-10-CM

## 2019-07-08 DIAGNOSIS — R19.5 LOOSE STOOLS: ICD-10-CM

## 2019-07-08 LAB
ANION GAP SERPL CALCULATED.3IONS-SCNC: 11 MMOL/L (ref 5–15)
BUN BLD-MCNC: 13 MG/DL (ref 8–23)
BUN/CREAT SERPL: 13.7 (ref 7–25)
CALCIUM SPEC-SCNC: 9.2 MG/DL (ref 8.6–10.5)
CHLORIDE SERPL-SCNC: 101 MMOL/L (ref 98–107)
CO2 SERPL-SCNC: 25 MMOL/L (ref 22–29)
CREAT BLD-MCNC: 0.95 MG/DL (ref 0.76–1.27)
GFR SERPL CREATININE-BSD FRML MDRD: 77 ML/MIN/1.73
GLUCOSE BLD-MCNC: 137 MG/DL (ref 65–99)
POTASSIUM BLD-SCNC: 4.6 MMOL/L (ref 3.5–5.2)
SODIUM BLD-SCNC: 137 MMOL/L (ref 136–145)

## 2019-07-08 PROCEDURE — 80048 BASIC METABOLIC PNL TOTAL CA: CPT | Performed by: INTERNAL MEDICINE

## 2019-07-08 PROCEDURE — 36415 COLL VENOUS BLD VENIPUNCTURE: CPT | Performed by: INTERNAL MEDICINE

## 2019-07-08 NOTE — TELEPHONE ENCOUNTER
Patient informed of stool results. He is not having any diarrhea but he is okay with seeing a GI. Need referral.

## 2019-07-24 NOTE — PROGRESS NOTES
"Chief Complaint   Patient presents with   • mucous in stool       Subjective     HPI    Jayden Marquez is a 79 y.o. male with a past medical history noted below who presents for evaluation of change in bowel habits and mucus in his stool.  Symptoms started about 6 weeks ago.  Timing is daily.  Reports going from 1 BM daily to 3-4 BMs daily.  Reports that his stools are \"slimy\" with mucus.  No nocturnal stools.    Denies illnesses, medication changes, dietary changes.  No associated weight loss.  Saw his pcp, had stool studies which were normal.  Recently saw some bright red blood in his stool, mostly with wiping. No abdominal pain, no cramping.  Does have itching and burning at times that his rectal area.  He has not taken any medicine for the bowel movements.    No family history of GI malignancy.    Smokes a little.  No excess ETOH    Retired from [employer].    No abdominal surgeries.      Last colonoscopy was about 2013 and reportedly normal.        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   • H/O Paresthesia     Foot and stump   • History of weight loss          Current Outpatient Medications:   •  ALPRAZolam (XANAX) 0.25 MG tablet, TAKE 1 TABLET BY MOUTH THREE TIMES DAILY AS NEEDED FOR ANXIETY, Disp: 30 tablet, Rfl: 0  •  FLUoxetine (PROzac) 20 MG capsule, TAKE 1 CAPSULE BY MOUTH EVERY DAY, Disp: 90 capsule, Rfl: 0  •  Ibuprofen-diphenhydrAMINE Cit (ADVIL PM PO), Take 1 tablet by mouth At Night As Needed., Disp: , Rfl:   •  LYRICA 50 MG capsule, TAKE 1 CAPSULE BY MOUTH THREE TIMES DAILY, Disp: 90 capsule, Rfl: 0  •  propranolol (INDERAL) 20 MG tablet, Take 20 mg by mouth Daily., Disp: , Rfl:   •  diphenhydrAMINE-acetaminophen (TYLENOL PM)  MG tablet per tablet, Take 1 tablet by mouth At Night As Needed for Sleep., Disp: , Rfl:   •  hydrOXYzine (ATARAX) 25 MG tablet, Take 1 tablet by mouth Every 8 " (Eight) Hours As Needed for Anxiety., Disp: 30 tablet, Rfl: 6    Allergies   Allergen Reactions   • Codeine        Social History     Socioeconomic History   • Marital status:      Spouse name: Gracia   • Number of children: Not on file   • Years of education: Not on file   • Highest education level: Not on file   Occupational History     Employer: RETIRED   Tobacco Use   • Smoking status: Current Every Day Smoker     Packs/day: 0.50     Years: 50.00     Pack years: 25.00     Types: Cigarettes   • Smokeless tobacco: Never Used   • Tobacco comment: will wean not want med   Substance and Sexual Activity   • Alcohol use: No   • Drug use: No   • Sexual activity: Yes       Family History   Problem Relation Age of Onset   • Diabetes Mother    • Cancer Mother    • No Known Problems Father    • Diabetes Brother        Review of Systems   Constitutional: Negative for activity change, appetite change and fatigue.   HENT: Negative for sore throat and trouble swallowing.    Respiratory: Negative.    Cardiovascular: Negative.    Gastrointestinal: Negative for abdominal distention, abdominal pain and blood in stool.   Endocrine: Negative for cold intolerance and heat intolerance.   Genitourinary: Negative for difficulty urinating, dysuria and frequency.   Musculoskeletal: Negative for arthralgias, back pain and myalgias.   Skin: Negative.    Hematological: Negative for adenopathy. Does not bruise/bleed easily.   All other systems reviewed and are negative.      Objective     Vitals:    07/25/19 0841   BP: 116/84   Temp: 97.8 °F (36.6 °C)         07/25/19  0841   Weight: 69.4 kg (153 lb)     Body mass index is 23.26 kg/m².    Physical Exam   Constitutional: He is oriented to person, place, and time. He appears well-developed and well-nourished. No distress.   HENT:   Head: Normocephalic and atraumatic.   Right Ear: External ear normal.   Left Ear: External ear normal.   Nose: Nose normal.   Mouth/Throat: Oropharynx is clear  and moist.   Eyes: Conjunctivae and EOM are normal. Right eye exhibits no discharge. Left eye exhibits no discharge. No scleral icterus.   Neck: Normal range of motion. Neck supple. No thyromegaly present.   No supraclavicular adenopathy   Cardiovascular: Normal rate, regular rhythm, normal heart sounds and intact distal pulses. Exam reveals no gallop.   No murmur heard.  No lower extremity edema   Pulmonary/Chest: Effort normal and breath sounds normal. No respiratory distress. He has no wheezes.   Abdominal: Soft. Normal appearance and bowel sounds are normal. He exhibits no distension and no mass. There is no hepatosplenomegaly. There is no tenderness. There is no rigidity, no rebound and no guarding. No hernia.   Genitourinary:   Genitourinary Comments: Rectal exam deferred   Musculoskeletal: Normal range of motion. He exhibits no edema or tenderness.   No atrophy of upper or lower extremities.  Normal digits and nails of both hands.   Lymphadenopathy:     He has no cervical adenopathy.   Neurological: He is alert and oriented to person, place, and time. He displays no atrophy. Coordination normal.   Skin: Skin is warm and dry. No rash noted. He is not diaphoretic. No erythema.   Psychiatric: He has a normal mood and affect. His behavior is normal. Judgment and thought content normal.   Vitals reviewed.      WBC   Date Value Ref Range Status   07/01/2019 7.90 3.40 - 10.80 10*3/mm3 Final     RBC   Date Value Ref Range Status   07/01/2019 5.23 4.14 - 5.80 10*6/mm3 Final     Hemoglobin   Date Value Ref Range Status   07/01/2019 15.9 13.0 - 17.7 g/dL Final     Hematocrit   Date Value Ref Range Status   07/01/2019 48.0 37.5 - 51.0 % Final     MCV   Date Value Ref Range Status   07/01/2019 91.8 79.0 - 97.0 fL Final     MCH   Date Value Ref Range Status   07/01/2019 30.4 26.6 - 33.0 pg Final     MCHC   Date Value Ref Range Status   07/01/2019 33.1 31.5 - 35.7 g/dL Final     RDW   Date Value Ref Range Status    07/01/2019 13.3 12.3 - 15.4 % Final     RDW-SD   Date Value Ref Range Status   11/12/2018 44.3 37.0 - 49.0 fl Final     MPV   Date Value Ref Range Status   07/01/2019 7.3 6.0 - 12.0 fL Final     Platelets   Date Value Ref Range Status   07/01/2019 272 140 - 450 10*3/mm3 Final     Neutrophil %   Date Value Ref Range Status   07/01/2019 55.6 42.7 - 76.0 % Final     Lymphocyte %   Date Value Ref Range Status   07/01/2019 34.7 19.6 - 45.3 % Final     Monocyte %   Date Value Ref Range Status   07/01/2019 9.7 5.0 - 12.0 % Final     Eosinophil %   Date Value Ref Range Status   11/12/2018 3.9 1.0 - 5.0 % Final     Basophil %   Date Value Ref Range Status   11/12/2018 0.7 0.0 - 1.1 % Final     Immature Grans %   Date Value Ref Range Status   11/12/2018 0.6 (H) 0.0 - 0.5 % Final     Neutrophils, Absolute   Date Value Ref Range Status   07/01/2019 4.40 1.70 - 7.00 10*3/mm3 Final     Lymphocytes, Absolute   Date Value Ref Range Status   07/01/2019 2.70 0.70 - 3.10 10*3/mm3 Final     Monocytes, Absolute   Date Value Ref Range Status   07/01/2019 0.80 0.10 - 0.90 10*3/mm3 Final     Eosinophils, Absolute   Date Value Ref Range Status   11/12/2018 0.33 0.00 - 0.36 10*3/mm3 Final     Basophils, Absolute   Date Value Ref Range Status   11/12/2018 0.06 0.00 - 0.10 10*3/mm3 Final     Immature Grans, Absolute   Date Value Ref Range Status   11/12/2018 0.05 (H) 0.00 - 0.03 10*3/mm3 Final     nRBC   Date Value Ref Range Status   11/12/2018 0.0 0.0 - 0.0 /100 WBC Final       Glucose   Date Value Ref Range Status   07/08/2019 137 (H) 65 - 99 mg/dL Final     Sodium   Date Value Ref Range Status   07/08/2019 137 136 - 145 mmol/L Final     Potassium   Date Value Ref Range Status   07/08/2019 4.6 3.5 - 5.2 mmol/L Final     CO2   Date Value Ref Range Status   07/08/2019 25.0 22.0 - 29.0 mmol/L Final     Chloride   Date Value Ref Range Status   07/08/2019 101 98 - 107 mmol/L Final     Anion Gap   Date Value Ref Range Status   07/08/2019 11.0  5.0 - 15.0 mmol/L Final     Creatinine   Date Value Ref Range Status   07/08/2019 0.95 0.76 - 1.27 mg/dL Final   10/18/2016 0.90 0.60 - 1.30 mg/dL Final     Comment:     Serial Number: [id number]    : [id number]     BUN   Date Value Ref Range Status   07/08/2019 13 8 - 23 mg/dL Final     BUN/Creatinine Ratio   Date Value Ref Range Status   07/08/2019 13.7 7.0 - 25.0 Final     Calcium   Date Value Ref Range Status   07/08/2019 9.2 8.6 - 10.5 mg/dL Final     eGFR Non  Amer   Date Value Ref Range Status   07/08/2019 77 >60 mL/min/1.73 Final     Alkaline Phosphatase   Date Value Ref Range Status   11/12/2018 101 38 - 116 U/L Final     Total Protein   Date Value Ref Range Status   11/12/2018 9.2 (H) 6.3 - 8.0 g/dL Final     ALT (SGPT)   Date Value Ref Range Status   11/12/2018 13 0 - 41 U/L Final     AST (SGOT)   Date Value Ref Range Status   11/12/2018 20 0 - 40 U/L Final     Total Bilirubin   Date Value Ref Range Status   11/12/2018 0.5 0.1 - 1.2 mg/dL Final     Albumin   Date Value Ref Range Status   11/12/2018 4.3 2.9 - 4.4 g/dL Final   11/12/2018 4.30 3.50 - 5.20 g/dL Final     Globulin   Date Value Ref Range Status   11/12/2018 4.9 (H) 1.8 - 3.5 gm/dL Final     A/G Ratio   Date Value Ref Range Status   11/12/2018 1.0 0.7 - 1.7 Final         Imaging Results (last 7 days)     ** No results found for the last 168 hours. **            No notes on file    Assessment/Plan    Change in bowel habits: Increase in frequency, more mucus in his stool    Rectal bleeding: Limited episode, however he is never had this before    Plan  I discussed options for further evaluation with the patient and his wife.  I do think colonoscopy is warranted for further evaluation of symptoms and random colon biopsies.  Discussed the procedure, the bowel preparation, the risks; he verbalizes understanding and agrees to proceed    Recommendations after endoscopy      Jayden was seen today for mucous in stool.    Diagnoses and all orders  for this visit:    Change in bowel habits  -     Case Request; Standing  -     Follow Anesthesia Guidelines / Standing Orders; Future  -     Obtain Informed Consent; Future  -     Implement Anesthesia Orders Day of Procedure; Standing  -     Obtain Informed Consent; Standing  -     Verify bowel prep was successful; Standing  -     lactated ringers infusion  -     Case Request    Rectal bleeding  -     Case Request; Standing  -     Follow Anesthesia Guidelines / Standing Orders; Future  -     Obtain Informed Consent; Future  -     Implement Anesthesia Orders Day of Procedure; Standing  -     Obtain Informed Consent; Standing  -     Verify bowel prep was successful; Standing  -     lactated ringers infusion  -     Case Request        I have discussed the above plan with the patient.  They verbalize understanding and are in agreement with the plan.  They have been advised to contact the office for any questions, concerns, or changes related to their health.    Dictated utilizing Dragon dictation

## 2019-07-25 ENCOUNTER — OFFICE VISIT (OUTPATIENT)
Dept: GASTROENTEROLOGY | Facility: CLINIC | Age: 79
End: 2019-07-25

## 2019-07-25 VITALS
DIASTOLIC BLOOD PRESSURE: 84 MMHG | WEIGHT: 153 LBS | BODY MASS INDEX: 23.19 KG/M2 | HEIGHT: 68 IN | TEMPERATURE: 97.8 F | SYSTOLIC BLOOD PRESSURE: 116 MMHG

## 2019-07-25 DIAGNOSIS — K62.5 RECTAL BLEEDING: ICD-10-CM

## 2019-07-25 DIAGNOSIS — R19.4 CHANGE IN BOWEL HABITS: Primary | ICD-10-CM

## 2019-07-25 PROCEDURE — 99204 OFFICE O/P NEW MOD 45 MIN: CPT | Performed by: INTERNAL MEDICINE

## 2019-07-25 RX ORDER — PROPRANOLOL HYDROCHLORIDE 20 MG/1
20 TABLET ORAL DAILY
COMMUNITY
End: 2020-04-27

## 2019-07-25 RX ORDER — SODIUM CHLORIDE, SODIUM LACTATE, POTASSIUM CHLORIDE, CALCIUM CHLORIDE 600; 310; 30; 20 MG/100ML; MG/100ML; MG/100ML; MG/100ML
30 INJECTION, SOLUTION INTRAVENOUS CONTINUOUS
Status: CANCELLED | OUTPATIENT
Start: 2019-09-30

## 2019-07-25 NOTE — PATIENT INSTRUCTIONS
Schedule the colonoscopy    For any additional questions, concerns or changes to your condition after today's office visit please contact the office at [phone].

## 2019-07-30 RX ORDER — ALPRAZOLAM 0.25 MG/1
TABLET ORAL
Qty: 30 TABLET | Refills: 0 | Status: SHIPPED | OUTPATIENT
Start: 2019-07-30 | End: 2019-09-03 | Stop reason: SDUPTHER

## 2019-08-16 RX ORDER — FLUOXETINE HYDROCHLORIDE 20 MG/1
CAPSULE ORAL
Qty: 90 CAPSULE | Refills: 0 | Status: SHIPPED | OUTPATIENT
Start: 2019-08-16 | End: 2019-11-14 | Stop reason: SDUPTHER

## 2019-08-16 RX ORDER — PROPRANOLOL HYDROCHLORIDE 20 MG/1
TABLET ORAL
Qty: 180 TABLET | Refills: 0 | Status: SHIPPED | OUTPATIENT
Start: 2019-08-16 | End: 2019-11-04 | Stop reason: SDUPTHER

## 2019-09-04 RX ORDER — PREGABALIN 50 MG/1
CAPSULE ORAL
Qty: 90 CAPSULE | Refills: 0 | Status: SHIPPED | OUTPATIENT
Start: 2019-09-04 | End: 2019-10-21 | Stop reason: SDUPTHER

## 2019-09-04 RX ORDER — ALPRAZOLAM 0.25 MG/1
TABLET ORAL
Qty: 30 TABLET | Refills: 0 | Status: ON HOLD | OUTPATIENT
Start: 2019-09-04 | End: 2019-09-30 | Stop reason: SDUPTHER

## 2019-09-30 ENCOUNTER — HOSPITAL ENCOUNTER (OUTPATIENT)
Facility: HOSPITAL | Age: 79
Setting detail: HOSPITAL OUTPATIENT SURGERY
Discharge: HOME | End: 2019-09-30
Attending: INTERNAL MEDICINE | Admitting: INTERNAL MEDICINE

## 2019-09-30 ENCOUNTER — ANESTHESIA (OUTPATIENT)
Dept: GASTROENTEROLOGY | Facility: HOSPITAL | Age: 79
End: 2019-09-30

## 2019-09-30 ENCOUNTER — ANESTHESIA EVENT (OUTPATIENT)
Dept: GASTROENTEROLOGY | Facility: HOSPITAL | Age: 79
End: 2019-09-30

## 2019-09-30 VITALS
OXYGEN SATURATION: 97 % | RESPIRATION RATE: 16 BRPM | HEIGHT: 70 IN | DIASTOLIC BLOOD PRESSURE: 83 MMHG | BODY MASS INDEX: 22.52 KG/M2 | WEIGHT: 157.31 LBS | HEART RATE: 67 BPM | SYSTOLIC BLOOD PRESSURE: 146 MMHG | TEMPERATURE: 98.5 F

## 2019-09-30 DIAGNOSIS — K62.5 RECTAL BLEEDING: ICD-10-CM

## 2019-09-30 DIAGNOSIS — R19.4 CHANGE IN BOWEL HABITS: ICD-10-CM

## 2019-09-30 PROCEDURE — S0260 H&P FOR SURGERY: HCPCS | Performed by: INTERNAL MEDICINE

## 2019-09-30 PROCEDURE — 88305 TISSUE EXAM BY PATHOLOGIST: CPT | Performed by: INTERNAL MEDICINE

## 2019-09-30 PROCEDURE — 45380 COLONOSCOPY AND BIOPSY: CPT | Performed by: INTERNAL MEDICINE

## 2019-09-30 PROCEDURE — 88312 SPECIAL STAINS GROUP 1: CPT | Performed by: INTERNAL MEDICINE

## 2019-09-30 PROCEDURE — 25010000002 PROPOFOL 10 MG/ML EMULSION: Performed by: NURSE ANESTHETIST, CERTIFIED REGISTERED

## 2019-09-30 PROCEDURE — 25010000002 PHENYLEPHRINE PER 1 ML: Performed by: NURSE ANESTHETIST, CERTIFIED REGISTERED

## 2019-09-30 RX ORDER — HYDROCORTISONE 100 MG/60ML
100 SUSPENSION RECTAL NIGHTLY
Qty: 30 ENEMA | Refills: 1 | Status: SHIPPED | OUTPATIENT
Start: 2019-09-30 | End: 2020-02-26

## 2019-09-30 RX ORDER — PROMETHAZINE HYDROCHLORIDE 25 MG/1
25 TABLET ORAL ONCE AS NEEDED
Status: DISCONTINUED | OUTPATIENT
Start: 2019-09-30 | End: 2019-09-30 | Stop reason: HOSPADM

## 2019-09-30 RX ORDER — SODIUM CHLORIDE, SODIUM LACTATE, POTASSIUM CHLORIDE, CALCIUM CHLORIDE 600; 310; 30; 20 MG/100ML; MG/100ML; MG/100ML; MG/100ML
30 INJECTION, SOLUTION INTRAVENOUS CONTINUOUS PRN
Status: DISCONTINUED | OUTPATIENT
Start: 2019-09-30 | End: 2019-09-30 | Stop reason: HOSPADM

## 2019-09-30 RX ORDER — PROMETHAZINE HYDROCHLORIDE 25 MG/1
25 SUPPOSITORY RECTAL ONCE AS NEEDED
Status: DISCONTINUED | OUTPATIENT
Start: 2019-09-30 | End: 2019-09-30 | Stop reason: HOSPADM

## 2019-09-30 RX ORDER — PROMETHAZINE HYDROCHLORIDE 25 MG/ML
12.5 INJECTION, SOLUTION INTRAMUSCULAR; INTRAVENOUS ONCE AS NEEDED
Status: DISCONTINUED | OUTPATIENT
Start: 2019-09-30 | End: 2019-09-30 | Stop reason: HOSPADM

## 2019-09-30 RX ORDER — PROPOFOL 10 MG/ML
VIAL (ML) INTRAVENOUS AS NEEDED
Status: DISCONTINUED | OUTPATIENT
Start: 2019-09-30 | End: 2019-09-30 | Stop reason: SURG

## 2019-09-30 RX ORDER — LIDOCAINE HYDROCHLORIDE 20 MG/ML
INJECTION, SOLUTION INFILTRATION; PERINEURAL AS NEEDED
Status: DISCONTINUED | OUTPATIENT
Start: 2019-09-30 | End: 2019-09-30 | Stop reason: SURG

## 2019-09-30 RX ORDER — PROPOFOL 10 MG/ML
VIAL (ML) INTRAVENOUS CONTINUOUS PRN
Status: DISCONTINUED | OUTPATIENT
Start: 2019-09-30 | End: 2019-09-30 | Stop reason: SURG

## 2019-09-30 RX ORDER — ALPRAZOLAM 0.25 MG/1
TABLET ORAL
Qty: 30 TABLET | Refills: 0 | Status: SHIPPED | OUTPATIENT
Start: 2019-09-30 | End: 2019-10-21 | Stop reason: SDUPTHER

## 2019-09-30 RX ADMIN — PROPOFOL 50 MG: 10 INJECTION, EMULSION INTRAVENOUS at 13:49

## 2019-09-30 RX ADMIN — SODIUM CHLORIDE, POTASSIUM CHLORIDE, SODIUM LACTATE AND CALCIUM CHLORIDE 30 ML/HR: 600; 310; 30; 20 INJECTION, SOLUTION INTRAVENOUS at 12:55

## 2019-09-30 RX ADMIN — PROPOFOL 160 MCG/KG/MIN: 10 INJECTION, EMULSION INTRAVENOUS at 13:50

## 2019-09-30 RX ADMIN — LIDOCAINE HYDROCHLORIDE 80 MG: 20 INJECTION, SOLUTION INFILTRATION; PERINEURAL at 13:49

## 2019-09-30 RX ADMIN — PHENYLEPHRINE HYDROCHLORIDE 100 MCG: 10 INJECTION INTRAVENOUS at 14:11

## 2019-09-30 NOTE — ANESTHESIA PREPROCEDURE EVALUATION
Anesthesia Evaluation     Patient summary reviewed and Nursing notes reviewed   no history of anesthetic complications:  NPO Solid Status: > 8 hours  NPO Liquid Status: > 8 hours           Airway   Mallampati: II  TM distance: >3 FB  Neck ROM: full  No difficulty expected  Dental - normal exam     Pulmonary    (+) a smoker Current Abstained day of surgery, COPD,   (-) rhonchi, decreased breath sounds, wheezes  Cardiovascular   Exercise tolerance: good (4-7 METS)    Rhythm: regular  Rate: normal    (-) hypertension, CAD, angina, TIWARI, murmur      Neuro/Psych  (+) psychiatric history Anxiety,     (-) CVA  GI/Hepatic/Renal/Endo    (+)  GI bleeding,   (-) no renal disease, diabetes    Musculoskeletal     Abdominal     Abdomen: soft.   Substance History      OB/GYN          Other                      Anesthesia Plan    ASA 3     MAC   total IV anesthesia  intravenous induction   Anesthetic plan, all risks, benefits, and alternatives have been provided, discussed and informed consent has been obtained with: patient.

## 2019-09-30 NOTE — ANESTHESIA POSTPROCEDURE EVALUATION
"Patient: Jayden Marquez    Procedure Summary     Date:  09/30/19 Room / Location:  [facility] /  [facility]    Anesthesia Start:  1345 Anesthesia Stop:  1423    Procedure:  COLONOSCOPY with Bx's (N/A ) Diagnosis:       Change in bowel habits      Rectal bleeding      (Change in bowel habits [R19.4])      (Rectal bleeding [K62.5])    Surgeon:  Celina Loza MD Provider:  Mark Woods MD    Anesthesia Type:  MAC ASA Status:  3          Anesthesia Type: MAC  Last vitals  BP   146/83 (09/30/19 1443)   Temp   36.9 °C (98.5 °F) (09/30/19 1244)   Pulse   67 (09/30/19 1443)   Resp   16 (09/30/19 1443)     SpO2   97 % (09/30/19 1443)     Post Anesthesia Care and Evaluation    Patient location during evaluation: bedside  Patient participation: complete - patient participated  Level of consciousness: awake and alert  Pain management: adequate  Airway patency: patent  Anesthetic complications: No anesthetic complications  PONV Status: none  Cardiovascular status: acceptable  Respiratory status: acceptable  Hydration status: acceptable    Comments: /83   Pulse 67   Temp 36.9 °C (98.5 °F) (Oral)   Resp 16   Ht 177.8 cm (70\")   Wt 71.4 kg (157 lb 5 oz)   SpO2 97%   BMI 22.57 kg/m²         "

## 2019-10-02 LAB
CYTO UR: NORMAL
LAB AP CASE REPORT: NORMAL
PATH REPORT.FINAL DX SPEC: NORMAL
PATH REPORT.GROSS SPEC: NORMAL

## 2019-10-02 RX ORDER — MESALAMINE 500 MG/1
500 CAPSULE, EXTENDED RELEASE ORAL 4 TIMES DAILY
Qty: 120 CAPSULE | Refills: 3 | Status: SHIPPED | OUTPATIENT
Start: 2019-10-02 | End: 2019-10-30

## 2019-10-02 NOTE — PROGRESS NOTES
His descending colon showed acute inflammation, the right side of the colon showed normal tissue.  I suspect he has some left-sided ulcerative colitis.    I would like for him to continue the steroid enemas for now.      I am also sending him mesalamine (pentasa) to take as directed to heal the inflammation.    Office follow up with me (only) in 6-8 weeks, thx

## 2019-10-03 ENCOUNTER — TELEPHONE (OUTPATIENT)
Dept: GASTROENTEROLOGY | Facility: CLINIC | Age: 79
End: 2019-10-03

## 2019-10-03 NOTE — TELEPHONE ENCOUNTER
Call to pt.  Advise per Dr Loza that descending colon showed acute inflammation, the right side of the colon showed normal tissue.  Suspect has some L sided ulcerative colitis.    Dr Loza would like pt to continue the steroid enemas for now.  Has also send pentasa to take as directed to heal the inflammation.    Verb understanding.  Appt scheduled with Dr Loza for 11/11 @ 10 am.

## 2019-10-03 NOTE — TELEPHONE ENCOUNTER
----- Message from Celina Loza MD sent at 10/2/2019 12:09 PM EDT -----  His descending colon showed acute inflammation, the right side of the colon showed normal tissue.  I suspect he has some left-sided ulcerative colitis.    I would like for him to continue the steroid enemas for now.      I am also sending him mesalamine (pentasa) to take as directed to heal the inflammation.    Office follow up with me (only) in 6-8 weeks, thx

## 2019-10-14 ENCOUNTER — PRIOR AUTHORIZATION (OUTPATIENT)
Dept: GASTROENTEROLOGY | Facility: CLINIC | Age: 79
End: 2019-10-14

## 2019-10-17 NOTE — TELEPHONE ENCOUNTER
? Your request has been denied   You asked for the drug listed above for your noninfective gastroenteritis and colitis, unspecified and hemorrhage of anus and rectum. [employer] follows Medicare rules. The Medicare rule in the Prescription Drug Manual (Chapter 6, Section 10.6) says an off-label use of a drug is a use that is not included on the drugs label as approved by the U.S. Food and Drug Administration (FDA). FDA approved drugs used for a disease other than what is on the official label may be covered under Medicare if [employer] determines the use to be medically accepted. [employer] makes these decisions on a case-by-case basis. We take into consideration the two major drug compendia. These compendia (or drug guides) are called the DRUGDEX Information System and the [facility] Formulary Service Drug Information (AHFS-DI). Off-label use is medically accepted when there is evidence in one or more of the compendia that it works for the disease in question. [employer] has determined that the off-label use of this drug for your condition is not medically accepted per Medicare rules and isn't covered based on available information.      DAPHNE, please see above

## 2019-10-21 NOTE — TELEPHONE ENCOUNTER
Called [employer] expedited appeals and spoke with Eder. Received fax #[phone] with case #[id number]. Faxed all pertinent info along with updated ICD 10 code for UC with confirmation received. Expedited appeals allow for 72 hours turnaround time.    FYI- Letter in media as pertains to alternatives of Pentasa as not sure if appeal will be approved. [employer] formulary alternatives include: Sulfasalizine, Balsalazide, or Apriso extended release.

## 2019-10-22 RX ORDER — PREGABALIN 50 MG/1
CAPSULE ORAL
Qty: 90 CAPSULE | Refills: 0 | Status: SHIPPED | OUTPATIENT
Start: 2019-10-22 | End: 2020-02-26

## 2019-10-22 RX ORDER — ALPRAZOLAM 0.25 MG/1
TABLET ORAL
Qty: 30 TABLET | Refills: 0 | Status: SHIPPED | OUTPATIENT
Start: 2019-10-22 | End: 2019-11-19 | Stop reason: SDUPTHER

## 2019-10-25 NOTE — TELEPHONE ENCOUNTER
Called [facility] appeals and spoke with Tiarra. She states the letter from 10/22/19 in media was the appeal denial. Patient must first try and fail Sulfasalazide, Basalazide, or Apriso extended release.

## 2019-10-30 RX ORDER — MESALAMINE 0.38 G/1
1500 CAPSULE, EXTENDED RELEASE ORAL DAILY
Qty: 120 CAPSULE | Refills: 3 | Status: SHIPPED | OUTPATIENT
Start: 2019-10-30 | End: 2020-01-14 | Stop reason: SDUPTHER

## 2019-10-30 NOTE — TELEPHONE ENCOUNTER
Call to pt.  Advise that insurance would not cover pentasa.  Advise per Dr Loza that order have been changed to apriso.  Verb understanding.

## 2019-11-04 ENCOUNTER — OFFICE VISIT (OUTPATIENT)
Dept: FAMILY MEDICINE CLINIC | Facility: CLINIC | Age: 79
End: 2019-11-04

## 2019-11-04 VITALS
TEMPERATURE: 98.3 F | HEIGHT: 70 IN | DIASTOLIC BLOOD PRESSURE: 82 MMHG | HEART RATE: 102 BPM | BODY MASS INDEX: 22.56 KG/M2 | OXYGEN SATURATION: 99 % | SYSTOLIC BLOOD PRESSURE: 140 MMHG | WEIGHT: 157.6 LBS

## 2019-11-04 DIAGNOSIS — Z00.00 MEDICARE ANNUAL WELLNESS VISIT, INITIAL: ICD-10-CM

## 2019-11-04 PROCEDURE — G0438 PPPS, INITIAL VISIT: HCPCS | Performed by: INTERNAL MEDICINE

## 2019-11-04 PROCEDURE — 96160 PT-FOCUSED HLTH RISK ASSMT: CPT | Performed by: INTERNAL MEDICINE

## 2019-11-04 NOTE — PROGRESS NOTES
The ABCs of the Annual Wellness Visit  Initial Medicare Wellness Visit    Chief Complaint   Patient presents with   • Follow-up       Subjective   History of Present Illness:  Jayden Marquez is a 79 y.o. male who presents for an Initial Medicare Wellness Visit.    HEALTH RISK ASSESSMENT    Recent Hospitalizations:  No hospitalization(s) within the last year.    Current Medical Providers:  Patient Care Team:  Jett Anaya Jr., MD as PCP - General  Kaley Olvera MD as Consulting Physician (Hematology and Oncology)  Jett Anaya Jr., MD as Referring Physician (Internal Medicine)    Smoking Status:  Social History     Tobacco Use   Smoking Status Current Every Day Smoker   • Packs/day: 0.50   • Years: 50.00   • Pack years: 25.00   • Types: Cigarettes   Smokeless Tobacco Never Used   Tobacco Comment    will wean not want med       Alcohol Consumption:  Social History     Substance and Sexual Activity   Alcohol Use No       Depression Screen:   PHQ-2/PHQ-9 Depression Screening 7/1/2019   Little interest or pleasure in doing things 0   Feeling down, depressed, or hopeless 3   Trouble falling or staying asleep, or sleeping too much 1   Feeling tired or having little energy 3   Poor appetite or overeating 0   Feeling bad about yourself - or that you are a failure or have let yourself or your family down 0   Trouble concentrating on things, such as reading the newspaper or watching television 0   Moving or speaking so slowly that other people could have noticed. Or the opposite - being so fidgety or restless that you have been moving around a lot more than usual 0   Thoughts that you would be better off dead, or of hurting yourself in some way 0   Total Score 7   If you checked off any problems, how difficult have these problems made it for you to do your work, take care of things at home, or get along with other people? Somewhat difficult       Fall Risk Screen:  DIVYAADI Fall Risk Assessment has not been  completed.    Health Habits and Functional and Cognitive Screening:  No flowsheet data found.      Does the patient have evidence of cognitive impairment? No    Asprin use counseling:Start ASA 81 mg daily     Age-appropriate Screening Schedule:  Refer to the list below for future screening recommendations based on patient's age, sex and/or medical conditions. Orders for these recommended tests are listed in the plan section. The patient has been provided with a written plan.    Health Maintenance   Topic Date Due   • PNEUMOCOCCAL VACCINES (65+ LOW/MEDIUM RISK) (2 of 2 - PPSV23) 11/04/2019 (Originally 5/18/2018)   • ZOSTER VACCINE (1 of 2) 11/04/2019 (Originally [birth year]/1992)   • TDAP/TD VACCINES (2 - Td) 11/09/2022   • COLONOSCOPY  09/30/2029   • INFLUENZA VACCINE  Addressed          The following portions of the patient's history were reviewed and updated as appropriate: allergies, current medications, past family history, past medical history, past social history, past surgical history and problem list.    Outpatient Medications Prior to Visit   Medication Sig Dispense Refill   • ALPRAZolam (XANAX) 0.25 MG tablet TAKE 1 TABLET BY MOUTH THREE TIMES DAILY AS NEEDED FOR ANXIETY 30 tablet 0   • ALPRAZolam (XANAX) 0.25 MG tablet TAKE 1 TABLET BY MOUTH THREE TIMES DAILY AS NEEDED FOR ANXIETY 30 tablet 0   • diphenhydrAMINE-acetaminophen (TYLENOL PM)  MG tablet per tablet Take 1 tablet by mouth At Night As Needed for Sleep.     • FLUoxetine (PROzac) 20 MG capsule TAKE 1 CAPSULE BY MOUTH EVERY DAY 90 capsule 0   • hydrocortisone (CORTENEMA) 100 MG/60ML enema Insert 1 enema into the rectum Every Night. 30 enema 1   • hydrOXYzine (ATARAX) 25 MG tablet Take 1 tablet by mouth Every 8 (Eight) Hours As Needed for Anxiety. 30 tablet 6   • Ibuprofen-diphenhydrAMINE Cit (ADVIL PM PO) Take 1 tablet by mouth At Night As Needed.     • mesalamine (APRISO) 0.375 g 24 hr capsule Take 4 capsules by mouth Daily. 120 capsule 3   •  "pregabalin (LYRICA) 50 MG capsule TAKE 1 CAPSULE BY MOUTH THREE TIMES DAILY 90 capsule 0   • propranolol (INDERAL) 20 MG tablet Take 20 mg by mouth Daily.     • propranolol (INDERAL) 20 MG tablet TAKE 1/2 TO 1 TABLET BY MOUTH EVERY MORNING AND DAILY AT NOON 180 tablet 0     No facility-administered medications prior to visit.        Patient Active Problem List   Diagnosis   • MGUS (monoclonal gammopathy of unknown significance)   • Change in bowel habits   • Rectal bleeding   • Pharyngitis   • Fracture, finger, distal phalanx, open   • Flu-like symptoms       Advanced Care Planning:  Patient has an advance directive - a copy has been provided and is visible in patient header    Review of Systems    Compared to one year ago, the patient feels his physical health is the same.  Compared to one year ago, the patient feels his mental health is the same.    Reviewed chart for potential of high risk medication in the elderly: not applicable  Reviewed chart for potential of harmful drug interactions in the elderly:not applicable    Objective         Vitals:    11/04/19 1635   BP: 140/82   BP Location: Right arm   Patient Position: Sitting   Cuff Size: Adult   Pulse: 102   Temp: 98.3 °F (36.8 °C)   TempSrc: Oral   SpO2: 99%   Weight: 71.5 kg (157 lb 9.6 oz)   Height: 177.8 cm (70\")   PainSc: 0-No pain       Body mass index is 22.61 kg/m².  Discussed the patient's BMI with him. The BMI is in the acceptable range.    Physical Exam          Assessment/Plan   Medicare Risks and Personalized Health Plan  CMS Preventative Services Quick Reference  Cardiovascular risk  Fall Risk  Immunizations Discussed/Encouraged (specific immunizations; Influenza, Pneumococcal 23, Prevnar and Shingrix )    The above risks/problems have been discussed with the patient.  Pertinent information has been shared with the patient in the After Visit Summary.  Follow up plans and orders are seen below in the Assessment/Plan Section.    There are no " diagnoses linked to this encounter.  Follow Up:  No Follow-up on file.     An After Visit Summary and PPPS were given to the patient.

## 2019-11-04 NOTE — PROGRESS NOTES
Subjective   Jayden Marquez is a 79 y.o. male.  Medicare wellness visit initial  Body mass index is 22.61 kg/m².  History of Present Illness     Medicare wellness visit initial current smoker not motivated to quit but currently smokes 6 cigarettes a day is encouraged to gradually wean down.  Vision is still okay is due for numerous immunizations up-to-date he declines to do so does not want flu shot Pneumovax etc.  Review of Systems   All other systems reviewed and are negative.      Objective   Vitals:    11/04/19 1635   BP: 140/82   Pulse: 102   Temp: 98.3 °F (36.8 °C)   SpO2: 99%   Weight: 71.5 kg (157 lb 9.6 oz)     Physical Exam   Constitutional: He appears well-developed and well-nourished.   HENT:   Head: Normocephalic and atraumatic.   Eyes: Conjunctivae are normal. Pupils are equal, round, and reactive to light.   Cardiovascular: Normal rate, regular rhythm and normal heart sounds.   Pulmonary/Chest: Effort normal and breath sounds normal.   Abdominal: Soft. Bowel sounds are normal.   Neurological: He is alert.   Unremarkable gait and station   Skin: Skin is warm.   Nursing note and vitals reviewed.      No results found for: INR    Procedures    Assessment/Plan     Medicare wellness visit initial  Do not advocate patient take aspirin every day for vision satisfactory hearing seems to be adequate as well.  We did briefly discuss his recent colonoscopy.  He is to follow-up with Dr. Parson soon    Much of this encounter note is an electronic transcription/translation of spoken language to printed text.  The electronic translation of spoken language may permit erroneous, or at times, nonsensical words or phrases to be inadvertently transcribed.  Although I have reviewed the note for such errors, some may still exist. If there are questions or for further clarification, please contact me.

## 2019-11-04 NOTE — PATIENT INSTRUCTIONS
Medicare Wellness  Personal Prevention Plan of Service     Date of Office Visit:  2019  Encounter Provider:  Jett Anaya MD  Place of Service:  [facility]  Patient Name: Jayden Marquez  :  1940    As part of the Medicare Wellness portion of your visit today, we are providing you with this personalized preventive plan of services (PPPS). This plan is based upon recommendations of the [place] Preventive Services Task Force (USPSTF) and the Advisory Committee on Immunization Practices (ACIP).    This lists the preventive care services that should be considered, and provides dates of when you are due. Items listed as completed are up-to-date and do not require any further intervention.    Health Maintenance   Topic Date Due   • PNEUMOCOCCAL VACCINES (65+ LOW/MEDIUM RISK) (2 of 2 - PPSV23) 2019 (Originally 2018)   • ZOSTER VACCINE (1 of 2) 2019 (Originally 1992)   • MEDICARE ANNUAL WELLNESS  2020   • TDAP/TD VACCINES (2 - Td) 2022   • COLONOSCOPY  2029   • INFLUENZA VACCINE  Addressed       No orders of the defined types were placed in this encounter.      No Follow-up on file.

## 2019-11-11 ENCOUNTER — OFFICE VISIT (OUTPATIENT)
Dept: GASTROENTEROLOGY | Facility: CLINIC | Age: 79
End: 2019-11-11

## 2019-11-11 VITALS
SYSTOLIC BLOOD PRESSURE: 128 MMHG | HEIGHT: 71 IN | DIASTOLIC BLOOD PRESSURE: 78 MMHG | TEMPERATURE: 97.8 F | BODY MASS INDEX: 22.06 KG/M2 | WEIGHT: 157.6 LBS

## 2019-11-11 DIAGNOSIS — K51.511 LEFT SIDED COLITIS WITH RECTAL BLEEDING (HCC): ICD-10-CM

## 2019-11-11 DIAGNOSIS — R19.4 CHANGE IN BOWEL HABITS: Primary | ICD-10-CM

## 2019-11-11 DIAGNOSIS — K62.5 RECTAL BLEEDING: ICD-10-CM

## 2019-11-11 LAB
ALBUMIN SERPL-MCNC: 4.2 G/DL (ref 3.5–5.2)
ALBUMIN/GLOB SERPL: 1.1 G/DL
ALP SERPL-CCNC: 108 U/L (ref 39–117)
ALT SERPL-CCNC: 9 U/L (ref 1–41)
AST SERPL-CCNC: 17 U/L (ref 1–40)
BASOPHILS # BLD AUTO: 0.05 10*3/MM3 (ref 0–0.2)
BASOPHILS NFR BLD AUTO: 0.9 % (ref 0–1.5)
BILIRUB SERPL-MCNC: 0.2 MG/DL (ref 0.2–1.2)
BUN SERPL-MCNC: 12 MG/DL (ref 8–23)
BUN/CREAT SERPL: 12.2 (ref 7–25)
CALCIUM SERPL-MCNC: 9.6 MG/DL (ref 8.6–10.5)
CHLORIDE SERPL-SCNC: 103 MMOL/L (ref 98–107)
CO2 SERPL-SCNC: 29 MMOL/L (ref 22–29)
CREAT SERPL-MCNC: 0.98 MG/DL (ref 0.76–1.27)
CRP SERPL-MCNC: 0.95 MG/DL (ref 0–0.5)
EOSINOPHIL # BLD AUTO: 0.08 10*3/MM3 (ref 0–0.4)
EOSINOPHIL NFR BLD AUTO: 1.4 % (ref 0.3–6.2)
ERYTHROCYTE [DISTWIDTH] IN BLOOD BY AUTOMATED COUNT: 12.8 % (ref 12.3–15.4)
ERYTHROCYTE [SEDIMENTATION RATE] IN BLOOD BY WESTERGREN METHOD: 49 MM/HR (ref 0–20)
GLOBULIN SER CALC-MCNC: 3.9 GM/DL
GLUCOSE SERPL-MCNC: 93 MG/DL (ref 65–99)
HCT VFR BLD AUTO: 45 % (ref 37.5–51)
HGB BLD-MCNC: 15.3 G/DL (ref 13–17.7)
IMM GRANULOCYTES # BLD AUTO: 0.02 10*3/MM3 (ref 0–0.05)
IMM GRANULOCYTES NFR BLD AUTO: 0.4 % (ref 0–0.5)
LYMPHOCYTES # BLD AUTO: 2.11 10*3/MM3 (ref 0.7–3.1)
LYMPHOCYTES NFR BLD AUTO: 38.2 % (ref 19.6–45.3)
MCH RBC QN AUTO: 30.9 PG (ref 26.6–33)
MCHC RBC AUTO-ENTMCNC: 34 G/DL (ref 31.5–35.7)
MCV RBC AUTO: 90.9 FL (ref 79–97)
MONOCYTES # BLD AUTO: 0.81 10*3/MM3 (ref 0.1–0.9)
MONOCYTES NFR BLD AUTO: 14.6 % (ref 5–12)
NEUTROPHILS # BLD AUTO: 2.46 10*3/MM3 (ref 1.7–7)
NEUTROPHILS NFR BLD AUTO: 44.5 % (ref 42.7–76)
NRBC BLD AUTO-RTO: 0 /100 WBC (ref 0–0.2)
PLATELET # BLD AUTO: 255 10*3/MM3 (ref 140–450)
POTASSIUM SERPL-SCNC: 5.3 MMOL/L (ref 3.5–5.2)
PROT SERPL-MCNC: 8.1 G/DL (ref 6–8.5)
RBC # BLD AUTO: 4.95 10*6/MM3 (ref 4.14–5.8)
SODIUM SERPL-SCNC: 142 MMOL/L (ref 136–145)
WBC # BLD AUTO: 5.53 10*3/MM3 (ref 3.4–10.8)

## 2019-11-11 PROCEDURE — 99214 OFFICE O/P EST MOD 30 MIN: CPT | Performed by: INTERNAL MEDICINE

## 2019-11-11 RX ORDER — LOPERAMIDE HYDROCHLORIDE 2 MG/1
2 CAPSULE ORAL 3 TIMES DAILY PRN
Qty: 90 CAPSULE | Refills: 1 | Status: SHIPPED | OUTPATIENT
Start: 2019-11-11 | End: 2020-04-27

## 2019-11-11 NOTE — PATIENT INSTRUCTIONS
Continue the mesalamine pills    Labs for inflammatory bowel disease    For any additional questions, concerns or changes to your condition after today's office visit please contact the office at [phone].

## 2019-11-11 NOTE — PROGRESS NOTES
Chief Complaint   Patient presents with   • Rectal Bleeding     Subjective     HPI  Jayden Marquez is a 79 y.o. male who presents for follow-up of increased stool frequency, mucus with stools.  He underwent colonoscopy on September 30 showing inflammation from the anal verge to about 55 cm along with diverticulosis.  Biopsy showed that this was consistent with inflammation; there was a rare granuloma.    Did a short course of suppositories and felt that that improved his symptoms.  There was a little delay in figuring out what mesalamine product his insurance would cover, but now he has been on a pre-so for about a week.  He has noticed some improvement with his stools.  He still averages about 3 soft and urgent stools a day.  He has not had any blood except for one episode yesterday.    No significant cramping.  No weight loss.  No nausea or vomiting    He has not used any Imodium to try to control his stools        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   • H/O Paresthesia     Foot and stump   • History of weight loss        Social History     Socioeconomic History   • Marital status:      Spouse name: Gracia   • Number of children: Not on file   • Years of education: Not on file   • Highest education level: Not on file   Occupational History     Employer: RETIRED   Tobacco Use   • Smoking status: Current Every Day Smoker     Packs/day: 0.50     Years: 50.00     Pack years: 25.00     Types: Cigarettes   • Smokeless tobacco: Never Used   • Tobacco comment: will wean not want med currently smoking 6 cigarettes a day continue to wean down   Substance and Sexual Activity   • Alcohol use: No   • Drug use: No   • Sexual activity: Yes         Current Outpatient Medications:   •  ALPRAZolam (XANAX) 0.25 MG tablet, TAKE 1 TABLET BY MOUTH THREE TIMES DAILY AS NEEDED FOR ANXIETY, Disp: 30 tablet, Rfl: 0  •   diphenhydrAMINE-acetaminophen (TYLENOL PM)  MG tablet per tablet, Take 1 tablet by mouth At Night As Needed for Sleep., Disp: , Rfl:   •  FLUoxetine (PROzac) 20 MG capsule, TAKE 1 CAPSULE BY MOUTH EVERY DAY, Disp: 90 capsule, Rfl: 0  •  hydrOXYzine (ATARAX) 25 MG tablet, Take 1 tablet by mouth Every 8 (Eight) Hours As Needed for Anxiety., Disp: 30 tablet, Rfl: 6  •  mesalamine (APRISO) 0.375 g 24 hr capsule, Take 4 capsules by mouth Daily., Disp: 120 capsule, Rfl: 3  •  pregabalin (LYRICA) 50 MG capsule, TAKE 1 CAPSULE BY MOUTH THREE TIMES DAILY, Disp: 90 capsule, Rfl: 0  •  hydrocortisone (CORTENEMA) 100 MG/60ML enema, Insert 1 enema into the rectum Every Night., Disp: 30 enema, Rfl: 1  •  loperamide (IMODIUM) 2 MG capsule, Take 1 capsule by mouth 3 (Three) Times a Day As Needed for Diarrhea., Disp: 90 capsule, Rfl: 1  •  propranolol (INDERAL) 20 MG tablet, Take 20 mg by mouth Daily., Disp: , Rfl:     Review of Systems   Constitutional: Negative for activity change, appetite change, chills and fever.   HENT: Negative for trouble swallowing.    Respiratory: Negative.    Cardiovascular: Negative.  Negative for chest pain.   Gastrointestinal: Positive for blood in stool and diarrhea. Negative for abdominal distention, abdominal pain, anal bleeding, constipation, nausea and vomiting.   Genitourinary: Negative for dysuria, frequency and hematuria.       Objective   Vitals:    11/11/19 0949   BP: 128/78   Temp: 97.8 °F (36.6 °C)         11/11/19 0949   Weight: 71.5 kg (157 lb 9.6 oz)     Body mass index is 21.98 kg/m².      Physical Exam   Constitutional: He is oriented to person, place, and time. He appears well-developed and well-nourished. No distress.   HENT:   Head: Normocephalic and atraumatic.   Right Ear: External ear normal.   Left Ear: External ear normal.   Nose: Nose normal.   Mouth/Throat: Oropharynx is clear and moist.   Eyes: Conjunctivae and EOM are normal. Right eye exhibits no discharge. Left eye  exhibits no discharge. No scleral icterus.   Neck: Normal range of motion. Neck supple. No thyromegaly present.   No supraclavicular adenopathy   Cardiovascular: Normal rate, regular rhythm, normal heart sounds and intact distal pulses. Exam reveals no gallop.   No murmur heard.  No lower extremity edema   Pulmonary/Chest: Effort normal and breath sounds normal. No respiratory distress. He has no wheezes.   Abdominal: Soft. Normal appearance and bowel sounds are normal. He exhibits no distension and no mass. There is no hepatosplenomegaly. There is no tenderness. There is no rigidity, no rebound and no guarding. No hernia.   Genitourinary:   Genitourinary Comments: Rectal exam deferred   Musculoskeletal: Normal range of motion. He exhibits no edema or tenderness.   No atrophy of upper or lower extremities.  Normal digits and nails of both hands.  Antalgic gait   Lymphadenopathy:     He has no cervical adenopathy.   Neurological: He is alert and oriented to person, place, and time. He displays no atrophy. Coordination normal.   Skin: Skin is warm and dry. No rash noted. He is not diaphoretic. No erythema.   Psychiatric: He has a normal mood and affect. His behavior is normal. Judgment and thought content normal.   Vitals reviewed.      WBC   Date Value Ref Range Status   07/01/2019 7.90 3.40 - 10.80 10*3/mm3 Final     RBC   Date Value Ref Range Status   07/01/2019 5.23 4.14 - 5.80 10*6/mm3 Final     Hemoglobin   Date Value Ref Range Status   07/01/2019 15.9 13.0 - 17.7 g/dL Final     Hematocrit   Date Value Ref Range Status   07/01/2019 48.0 37.5 - 51.0 % Final     MCV   Date Value Ref Range Status   07/01/2019 91.8 79.0 - 97.0 fL Final     MCH   Date Value Ref Range Status   07/01/2019 30.4 26.6 - 33.0 pg Final     MCHC   Date Value Ref Range Status   07/01/2019 33.1 31.5 - 35.7 g/dL Final     RDW   Date Value Ref Range Status   07/01/2019 13.3 12.3 - 15.4 % Final     RDW-SD   Date Value Ref Range Status    11/12/2018 44.3 37.0 - 49.0 fl Final     MPV   Date Value Ref Range Status   07/01/2019 7.3 6.0 - 12.0 fL Final     Platelets   Date Value Ref Range Status   07/01/2019 272 140 - 450 10*3/mm3 Final     Neutrophil %   Date Value Ref Range Status   07/01/2019 55.6 42.7 - 76.0 % Final     Lymphocyte %   Date Value Ref Range Status   07/01/2019 34.7 19.6 - 45.3 % Final     Monocyte %   Date Value Ref Range Status   07/01/2019 9.7 5.0 - 12.0 % Final     Eosinophil %   Date Value Ref Range Status   11/12/2018 3.9 1.0 - 5.0 % Final     Basophil %   Date Value Ref Range Status   11/12/2018 0.7 0.0 - 1.1 % Final     Immature Grans %   Date Value Ref Range Status   11/12/2018 0.6 (H) 0.0 - 0.5 % Final     Neutrophils, Absolute   Date Value Ref Range Status   07/01/2019 4.40 1.70 - 7.00 10*3/mm3 Final     Lymphocytes, Absolute   Date Value Ref Range Status   07/01/2019 2.70 0.70 - 3.10 10*3/mm3 Final     Monocytes, Absolute   Date Value Ref Range Status   07/01/2019 0.80 0.10 - 0.90 10*3/mm3 Final     Eosinophils, Absolute   Date Value Ref Range Status   11/12/2018 0.33 0.00 - 0.36 10*3/mm3 Final     Basophils, Absolute   Date Value Ref Range Status   11/12/2018 0.06 0.00 - 0.10 10*3/mm3 Final     Immature Grans, Absolute   Date Value Ref Range Status   11/12/2018 0.05 (H) 0.00 - 0.03 10*3/mm3 Final     nRBC   Date Value Ref Range Status   11/12/2018 0.0 0.0 - 0.0 /100 WBC Final       Lab Results   Component Value Date    GLUCOSE 137 (H) 07/08/2019    BUN 13 07/08/2019    CREATININE 0.95 07/08/2019    EGFRIFNONA 77 07/08/2019    EGFRIFAFRI  09/17/2016      Comment:      <15 Indicative of kidney failure.    BCR 13.7 07/08/2019    CO2 25.0 07/08/2019    CALCIUM 9.2 07/08/2019    PROTENTOTREF 8.7 (H) 11/12/2018    ALBUMIN 4.3 11/12/2018    ALBUMIN 4.30 11/12/2018    LABIL2 1.0 11/12/2018    AST 20 11/12/2018    ALT 13 11/12/2018         Imaging Results (Last 7 Days)     ** No results found for the last 168 hours. **             Assessment/Plan    Change in bowel habits: Secondary to left-sided colitis, seems to be improving now that he is on mesalamine    Rectal bleeding: With above    Left sided colitis with rectal bleeding: I suspect this is most likely ulcerative colitis, however there was one granuloma seen.  It seems that he is responding to the mesalamine     Plan  CBC, CMP, inflammatory markers today  Continue mesalamine  Advised using Imodium as needed to control loose stools   if he does not improve, then will check an IBD SGI to help tease out between Crohn's and UC    Jayden was seen today for rectal bleeding.    Diagnoses and all orders for this visit:    Change in bowel habits    Rectal bleeding  -     CBC & Differential  -     Comprehensive Metabolic Panel  -     C-reactive Protein  -     Sedimentation Rate    Left sided colitis with rectal bleeding (CMS/HCC)  -     CBC & Differential  -     Comprehensive Metabolic Panel  -     C-reactive Protein  -     Sedimentation Rate    Other orders  -     loperamide (IMODIUM) 2 MG capsule; Take 1 capsule by mouth 3 (Three) Times a Day As Needed for Diarrhea.        Dictated utilizing Dragon dictation

## 2019-11-12 ENCOUNTER — TELEPHONE (OUTPATIENT)
Dept: GASTROENTEROLOGY | Facility: CLINIC | Age: 79
End: 2019-11-12

## 2019-11-12 NOTE — TELEPHONE ENCOUNTER
Note seen have patient get follow-up BMP this week bananas or orange juice  in diet do not see listed potassium supplements.

## 2019-11-12 NOTE — TELEPHONE ENCOUNTER
Call to spouse, Gracia (see hipaa).  Advise per Dr Loza that inflammatory markers are on the elevated side, in line with colitis.    Blood count is stable.    Potassium is slightly high, it has been in the past, but DR Loza does recommend that f/u with DR Anaya re: this.  Verb understanding.    Update to Dr Anaya.

## 2019-11-12 NOTE — TELEPHONE ENCOUNTER
----- Message from Celina Loza MD sent at 11/12/2019 12:37 PM EST -----  Amatory markers are on the elevated side in line with his colitis    His blood count is stable    His potassium is slightly high, it has been in the past but I do recommend that he follow-up with Dr. Anaya regarding this.

## 2019-11-12 NOTE — PROGRESS NOTES
Amatory markers are on the elevated side in line with his colitis    His blood count is stable    His potassium is slightly high, it has been in the past but I do recommend that he follow-up with Dr. Anaya regarding this.

## 2019-11-14 RX ORDER — PROPRANOLOL HYDROCHLORIDE 20 MG/1
TABLET ORAL
Qty: 180 TABLET | Refills: 0 | Status: SHIPPED | OUTPATIENT
Start: 2019-11-14 | End: 2020-04-27

## 2019-11-14 RX ORDER — FLUOXETINE HYDROCHLORIDE 20 MG/1
CAPSULE ORAL
Qty: 90 CAPSULE | Refills: 0 | Status: SHIPPED | OUTPATIENT
Start: 2019-11-14 | End: 2020-02-12

## 2019-11-20 RX ORDER — ALPRAZOLAM 0.25 MG/1
TABLET ORAL
Qty: 30 TABLET | Refills: 0 | Status: SHIPPED | OUTPATIENT
Start: 2019-11-20 | End: 2019-12-23

## 2019-12-23 RX ORDER — ALPRAZOLAM 0.25 MG/1
TABLET ORAL
Qty: 30 TABLET | Refills: 0 | Status: SHIPPED | OUTPATIENT
Start: 2019-12-23 | End: 2020-01-17

## 2020-01-14 ENCOUNTER — OFFICE VISIT (OUTPATIENT)
Dept: GASTROENTEROLOGY | Facility: CLINIC | Age: 80
End: 2020-01-14

## 2020-01-14 VITALS
WEIGHT: 160 LBS | TEMPERATURE: 98.8 F | HEIGHT: 71 IN | SYSTOLIC BLOOD PRESSURE: 124 MMHG | DIASTOLIC BLOOD PRESSURE: 72 MMHG | BODY MASS INDEX: 22.4 KG/M2

## 2020-01-14 DIAGNOSIS — R19.4 CHANGE IN BOWEL HABITS: ICD-10-CM

## 2020-01-14 DIAGNOSIS — K62.5 RECTAL BLEEDING: ICD-10-CM

## 2020-01-14 DIAGNOSIS — K51.511 LEFT SIDED COLITIS WITH RECTAL BLEEDING (HCC): Primary | ICD-10-CM

## 2020-01-14 PROCEDURE — 99214 OFFICE O/P EST MOD 30 MIN: CPT | Performed by: INTERNAL MEDICINE

## 2020-01-14 RX ORDER — MESALAMINE 0.38 G/1
1500 CAPSULE, EXTENDED RELEASE ORAL DAILY
Qty: 120 CAPSULE | Refills: 3 | Status: SHIPPED | OUTPATIENT
Start: 2020-01-14 | End: 2020-02-26 | Stop reason: SDUPTHER

## 2020-01-14 NOTE — PATIENT INSTRUCTIONS
Continue the mesalamine    For any additional questions, concerns or changes to your condition after today's office visit please contact the office at [phone].

## 2020-01-14 NOTE — PROGRESS NOTES
Chief Complaint   Patient presents with   • Diarrhea   • Rectal Bleeding     Subjective     HPI  Jayden Marquez is a 79 y.o. male who presents for follow-up left sided colitis with rectal bleeding, change in bowel habits.       He has been on mesalamine.  Averaging about 2 soft stools daily.  They are still a little mucousy but not bad.  Less urgency.  About once per week, he does have a little blood with wiping but not severe.  No abdominal pain, no cramping.  He says he is tolerating the symptoms quite well.  He thinks he has been out of the mesalamine for a couple of days.    Labs in November confirmed elevated inflammatory markers.  Hemoglobin was stable.    Eating and drinking well.        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   • H/O Paresthesia     Foot and stump   • History of weight loss        Social History     Socioeconomic History   • Marital status:      Spouse name: Gracia   • Number of children: Not on file   • Years of education: Not on file   • Highest education level: Not on file   Occupational History     Employer: RETIRED   Tobacco Use   • Smoking status: Current Every Day Smoker     Packs/day: 0.50     Years: 50.00     Pack years: 25.00     Types: Cigarettes   • Smokeless tobacco: Never Used   • Tobacco comment: will wean not want med currently smoking 6 cigarettes a day continue to wean down   Substance and Sexual Activity   • Alcohol use: No   • Drug use: No   • Sexual activity: Yes         Current Outpatient Medications:   •  ALPRAZolam (XANAX) 0.25 MG tablet, TAKE 1 TABLET BY MOUTH THREE TIMES DAILY AS NEEDED FOR ANXIETY, Disp: 30 tablet, Rfl: 0  •  diphenhydrAMINE-acetaminophen (TYLENOL PM)  MG tablet per tablet, Take 1 tablet by mouth At Night As Needed for Sleep., Disp: , Rfl:   •  FLUoxetine (PROzac) 20 MG capsule, TAKE 1 CAPSULE BY MOUTH EVERY DAY, Disp: 90 capsule, Rfl:  0  •  hydrOXYzine (ATARAX) 25 MG tablet, Take 1 tablet by mouth Every 8 (Eight) Hours As Needed for Anxiety., Disp: 30 tablet, Rfl: 6  •  mesalamine (APRISO) 0.375 g 24 hr capsule, Take 4 capsules by mouth Daily., Disp: 120 capsule, Rfl: 3  •  pregabalin (LYRICA) 50 MG capsule, TAKE 1 CAPSULE BY MOUTH THREE TIMES DAILY, Disp: 90 capsule, Rfl: 0  •  propranolol (INDERAL) 20 MG tablet, Take 20 mg by mouth Daily., Disp: , Rfl:   •  propranolol (INDERAL) 20 MG tablet, TAKE 1/2 TO 1 TABLET BY MOUTH EVERY MORNING AND AT NOON, Disp: 180 tablet, Rfl: 0  •  hydrocortisone (CORTENEMA) 100 MG/60ML enema, Insert 1 enema into the rectum Every Night., Disp: 30 enema, Rfl: 1  •  loperamide (IMODIUM) 2 MG capsule, Take 1 capsule by mouth 3 (Three) Times a Day As Needed for Diarrhea., Disp: 90 capsule, Rfl: 1    Review of Systems   Constitutional: Negative for activity change, appetite change, chills and fever.   HENT: Negative for trouble swallowing.    Respiratory: Negative.    Cardiovascular: Negative.  Negative for chest pain.   Gastrointestinal: Negative for abdominal distention, abdominal pain, anal bleeding, blood in stool, constipation, diarrhea, nausea and vomiting.        Mucousy stool, occasional bright red blood per rectum with wiping   Genitourinary: Negative for dysuria, frequency and hematuria.       Objective   Vitals:    01/14/20 1430   BP: 124/72   Temp: 98.8 °F (37.1 °C)         01/14/20  1430   Weight: 72.6 kg (160 lb)     Body mass index is 22.32 kg/m².      Physical Exam   Constitutional: He is oriented to person, place, and time. He appears well-developed and well-nourished. No distress.   Appears older than stated age   HENT:   Head: Normocephalic and atraumatic.   Right Ear: External ear normal.   Left Ear: External ear normal.   Nose: Nose normal.   Mouth/Throat: Oropharynx is clear and moist.   Eyes: Conjunctivae and EOM are normal. Right eye exhibits no discharge. Left eye exhibits no discharge. No  scleral icterus.   Neck: Normal range of motion. Neck supple. No thyromegaly present.   No supraclavicular adenopathy   Cardiovascular: Normal rate, regular rhythm, normal heart sounds and intact distal pulses. Exam reveals no gallop.   No murmur heard.  No lower extremity edema   Pulmonary/Chest: Effort normal and breath sounds normal. No respiratory distress. He has no wheezes.   Abdominal: Soft. Normal appearance and bowel sounds are normal. He exhibits no distension and no mass. There is no hepatosplenomegaly. There is no tenderness. There is no rigidity, no rebound and no guarding. No hernia.   Genitourinary:   Genitourinary Comments: Rectal exam deferred   Musculoskeletal: Normal range of motion. He exhibits no edema or tenderness.   No atrophy of upper or lower extremities.  Normal digits and nails of both hands.  Antalgic gait   Lymphadenopathy:     He has no cervical adenopathy.   Neurological: He is alert and oriented to person, place, and time. He displays no atrophy. Coordination normal.   Skin: Skin is warm and dry. No rash noted. He is not diaphoretic. No erythema.   Psychiatric: He has a normal mood and affect. His behavior is normal. Judgment and thought content normal.   Vitals reviewed.      WBC   Date Value Ref Range Status   11/11/2019 5.53 3.40 - 10.80 10*3/mm3 Final     RBC   Date Value Ref Range Status   11/11/2019 4.95 4.14 - 5.80 10*6/mm3 Final     Hemoglobin   Date Value Ref Range Status   11/11/2019 15.3 13.0 - 17.7 g/dL Final   07/01/2019 15.9 13.0 - 17.7 g/dL Final     Hematocrit   Date Value Ref Range Status   11/11/2019 45.0 37.5 - 51.0 % Final   07/01/2019 48.0 37.5 - 51.0 % Final     MCV   Date Value Ref Range Status   11/11/2019 90.9 79.0 - 97.0 fL Final   07/01/2019 91.8 79.0 - 97.0 fL Final     MCH   Date Value Ref Range Status   11/11/2019 30.9 26.6 - 33.0 pg Final   07/01/2019 30.4 26.6 - 33.0 pg Final     MCHC   Date Value Ref Range Status   11/11/2019 34.0 31.5 - 35.7 g/dL  Final   07/01/2019 33.1 31.5 - 35.7 g/dL Final     RDW   Date Value Ref Range Status   11/11/2019 12.8 12.3 - 15.4 % Final   07/01/2019 13.3 12.3 - 15.4 % Final     RDW-SD   Date Value Ref Range Status   11/12/2018 44.3 37.0 - 49.0 fl Final     MPV   Date Value Ref Range Status   07/01/2019 7.3 6.0 - 12.0 fL Final     Platelets   Date Value Ref Range Status   11/11/2019 255 140 - 450 10*3/mm3 Final   07/01/2019 272 140 - 450 10*3/mm3 Final     Neutrophil Rel %   Date Value Ref Range Status   11/11/2019 44.5 42.7 - 76.0 % Final     Neutrophil %   Date Value Ref Range Status   07/01/2019 55.6 42.7 - 76.0 % Final     Lymphocyte Rel %   Date Value Ref Range Status   11/11/2019 38.2 19.6 - 45.3 % Final     Lymphocyte %   Date Value Ref Range Status   07/01/2019 34.7 19.6 - 45.3 % Final     Monocyte Rel %   Date Value Ref Range Status   11/11/2019 14.6 (H) 5.0 - 12.0 % Final     Monocyte %   Date Value Ref Range Status   07/01/2019 9.7 5.0 - 12.0 % Final     Eosinophil Rel %   Date Value Ref Range Status   11/11/2019 1.4 0.3 - 6.2 % Final     Eosinophil %   Date Value Ref Range Status   11/12/2018 3.9 1.0 - 5.0 % Final     Basophil Rel %   Date Value Ref Range Status   11/11/2019 0.9 0.0 - 1.5 % Final     Basophil %   Date Value Ref Range Status   11/12/2018 0.7 0.0 - 1.1 % Final     Immature Grans %   Date Value Ref Range Status   11/12/2018 0.6 (H) 0.0 - 0.5 % Final     Neutrophils Absolute   Date Value Ref Range Status   11/11/2019 2.46 1.70 - 7.00 10*3/mm3 Final     Neutrophils, Absolute   Date Value Ref Range Status   07/01/2019 4.40 1.70 - 7.00 10*3/mm3 Final     Lymphocytes Absolute   Date Value Ref Range Status   11/11/2019 2.11 0.70 - 3.10 10*3/mm3 Final     Lymphocytes, Absolute   Date Value Ref Range Status   07/01/2019 2.70 0.70 - 3.10 10*3/mm3 Final     Monocytes Absolute   Date Value Ref Range Status   11/11/2019 0.81 0.10 - 0.90 10*3/mm3 Final     Monocytes, Absolute   Date Value Ref Range Status    07/01/2019 0.80 0.10 - 0.90 10*3/mm3 Final     Eosinophils Absolute   Date Value Ref Range Status   11/11/2019 0.08 0.00 - 0.40 10*3/mm3 Final     Eosinophils, Absolute   Date Value Ref Range Status   11/12/2018 0.33 0.00 - 0.36 10*3/mm3 Final     Basophils Absolute   Date Value Ref Range Status   11/11/2019 0.05 0.00 - 0.20 10*3/mm3 Final     Basophils, Absolute   Date Value Ref Range Status   11/12/2018 0.06 0.00 - 0.10 10*3/mm3 Final     Immature Grans, Absolute   Date Value Ref Range Status   11/12/2018 0.05 (H) 0.00 - 0.03 10*3/mm3 Final     nRBC   Date Value Ref Range Status   11/11/2019 0.0 0.0 - 0.2 /100 WBC Final   11/12/2018 0.0 0.0 - 0.0 /100 WBC Final       Lab Results   Component Value Date    GLUCOSE 137 (H) 07/08/2019    BUN 12 11/11/2019    CREATININE 0.98 11/11/2019    EGFRIFNONA 74 11/11/2019    EGFRIFAFRI 90 11/11/2019    BCR 12.2 11/11/2019    CO2 29.0 11/11/2019    CALCIUM 9.6 11/11/2019    PROTENTOTREF 8.1 11/11/2019    ALBUMIN 4.20 11/11/2019    LABIL2 1.1 11/11/2019    AST 17 11/11/2019    ALT 9 11/11/2019         Imaging Results (Last 7 Days)     ** No results found for the last 168 hours. **            Assessment/Plan    Left-sided colitis: He seems to be doing well on mesalamine.  Fewer BMs, less bleeding episodes    Rectal bleeding: With above, occurring less frequently, recent normal Hb    Change in bowels: still with mucousy stools but he is not too bothered by it, less frequent than before    Plan  Continue mesalamine  Continue to monitor stools  We will repeat his inflammatory markers at his next appointment    Jayden was seen today for diarrhea and rectal bleeding.    Diagnoses and all orders for this visit:    Left sided colitis with rectal bleeding (CMS/HCC)    Change in bowel habits    Rectal bleeding    Other orders  -     mesalamine (APRISO) 0.375 g 24 hr capsule; Take 4 capsules by mouth Daily.        Dictated utilizing Dragon dictation

## 2020-01-17 RX ORDER — ALPRAZOLAM 0.25 MG/1
TABLET ORAL
Qty: 30 TABLET | Refills: 0 | Status: SHIPPED | OUTPATIENT
Start: 2020-01-17 | End: 2020-02-17

## 2020-02-12 RX ORDER — FLUOXETINE HYDROCHLORIDE 20 MG/1
CAPSULE ORAL
Qty: 90 CAPSULE | Refills: 0 | Status: SHIPPED | OUTPATIENT
Start: 2020-02-12 | End: 2020-05-12

## 2020-02-17 ENCOUNTER — OFFICE VISIT (OUTPATIENT)
Dept: FAMILY MEDICINE CLINIC | Facility: CLINIC | Age: 80
End: 2020-02-17

## 2020-02-17 VITALS
WEIGHT: 153.2 LBS | HEART RATE: 72 BPM | BODY MASS INDEX: 21.45 KG/M2 | OXYGEN SATURATION: 98 % | SYSTOLIC BLOOD PRESSURE: 138 MMHG | DIASTOLIC BLOOD PRESSURE: 70 MMHG | HEIGHT: 71 IN | TEMPERATURE: 98.2 F

## 2020-02-17 DIAGNOSIS — K92.1 BLOOD IN STOOL: ICD-10-CM

## 2020-02-17 DIAGNOSIS — R19.7 DIARRHEA, UNSPECIFIED TYPE: Primary | ICD-10-CM

## 2020-02-17 LAB
ERYTHROCYTE [DISTWIDTH] IN BLOOD BY AUTOMATED COUNT: 13.8 % (ref 12.3–15.4)
HCT VFR BLD AUTO: 47.8 % (ref 37.5–51)
HGB BLD-MCNC: 15.5 G/DL (ref 13–17.7)
LYMPHOCYTES # BLD AUTO: 2.7 10*3/MM3 (ref 0.7–3.1)
LYMPHOCYTES NFR BLD AUTO: 35.1 % (ref 19.6–45.3)
MCH RBC QN AUTO: 29.3 PG (ref 26.6–33)
MCHC RBC AUTO-ENTMCNC: 32.3 G/DL (ref 31.5–35.7)
MCV RBC AUTO: 90.6 FL (ref 79–97)
MONOCYTES # BLD AUTO: 0.7 10*3/MM3 (ref 0.1–0.9)
MONOCYTES NFR BLD AUTO: 9.2 % (ref 5–12)
NEUTROPHILS # BLD AUTO: 4.2 10*3/MM3 (ref 1.7–7)
NEUTROPHILS NFR BLD AUTO: 55.7 % (ref 42.7–76)
PLATELET # BLD AUTO: 303 10*3/MM3 (ref 140–450)
PMV BLD AUTO: 6.8 FL (ref 6–12)
RBC # BLD AUTO: 5.28 10*6/MM3 (ref 4.14–5.8)
WBC NRBC COR # BLD: 7.6 10*3/MM3 (ref 3.4–10.8)

## 2020-02-17 PROCEDURE — 99214 OFFICE O/P EST MOD 30 MIN: CPT | Performed by: INTERNAL MEDICINE

## 2020-02-17 PROCEDURE — 36415 COLL VENOUS BLD VENIPUNCTURE: CPT | Performed by: INTERNAL MEDICINE

## 2020-02-17 PROCEDURE — 85025 COMPLETE CBC W/AUTO DIFF WBC: CPT | Performed by: INTERNAL MEDICINE

## 2020-02-17 RX ORDER — ALPRAZOLAM 0.25 MG/1
TABLET ORAL
Qty: 30 TABLET | Refills: 0 | Status: SHIPPED | OUTPATIENT
Start: 2020-02-17 | End: 2020-03-22

## 2020-02-17 NOTE — PROGRESS NOTES
Subjective   Jayden Marquez is a 79 y.o. male.   Body mass index is 21.37 kg/m².  Patient with ongoing loose stools also some blood in stool  History of Present Illness   Loose stools with some blood in stool ongoing since he had colonoscopy done.  He is on a previous year.  Per description he does not think that is helping at all.  His last follow-up with GI the did get some moderate improvement by history.  Patient can need to see his GI again given the mucus in the stool we will get updated stool for C. difficile O&P and enteric pathogens.  Also updated CBC.  Loose stools 4-5times qd sine c scope  Some blood in stool there is concern for ulcerative colitis by Dr. Parson report involving predominantly the left descending colon  Drug allergies are codeine.  Current smoker.  Family history is positive for diabetes cancer undefined  Review of Systems   Gastrointestinal: Positive for blood in stool.   All other systems reviewed and are negative.      Objective   Vitals:    02/17/20 1457   BP: 138/70   Pulse: 72   Temp: 98.2 °F (36.8 °C)   SpO2: 98%   Weight: 69.5 kg (153 lb 3.2 oz)     Physical Exam   Constitutional: He appears well-developed and well-nourished.   HENT:   Head: Normocephalic and atraumatic.   Eyes: Pupils are equal, round, and reactive to light. Conjunctivae are normal.   Cardiovascular: Normal rate, regular rhythm and normal heart sounds.   Pulmonary/Chest: Breath sounds normal.   Abdominal: Soft. Bowel sounds are normal.   Neurological: He is alert.   Unremarkable gait and station   Skin: Skin is warm and dry.   Nursing note and vitals reviewed.      No results found for: INR    Procedures    Assessment/Plan     Mucus in stool/personal history for colitis plan to move appointment with GI Dr. Parson group mesalamine is not working well by patient's history this point time get updated stool cultures    Blood in stool get CBC as well.    Much of this encounter note is an electronic  transcription/translation of spoken language to printed text.  The electronic translation of spoken language may permit erroneous, or at times, nonsensical words or phrases to be inadvertently transcribed.  Although I have reviewed the note for such errors, some may still exist. If there are questions or for further clarification, please contact me.

## 2020-02-19 LAB — C DIFF TOX A+B STL QL IA: NEGATIVE

## 2020-02-20 ENCOUNTER — TELEPHONE (OUTPATIENT)
Dept: FAMILY MEDICINE CLINIC | Facility: CLINIC | Age: 80
End: 2020-02-20

## 2020-02-20 LAB
O+P SPEC MICRO: NORMAL
OVA + PARASITE RESULT 1: NORMAL

## 2020-02-20 NOTE — TELEPHONE ENCOUNTER
Pt informed of results. Pt states he cannot get in gastro office until April. Per Dr. Anaya he needs to be seen sooner. Called Dr. Loza office and spoke with Sosa. He is going to see the NP on 2/26 @ 2:30 pm. Pt informed.     ---- Message from Jtet Anaya Jr., MD sent at 2/20/2020  9:38 AM EST -----    These stools are negative including for C. difficile

## 2020-02-25 ENCOUNTER — PRIOR AUTHORIZATION (OUTPATIENT)
Dept: GASTROENTEROLOGY | Facility: CLINIC | Age: 80
End: 2020-02-25

## 2020-02-26 ENCOUNTER — OFFICE VISIT (OUTPATIENT)
Dept: GASTROENTEROLOGY | Facility: CLINIC | Age: 80
End: 2020-02-26

## 2020-02-26 VITALS
TEMPERATURE: 97.6 F | SYSTOLIC BLOOD PRESSURE: 122 MMHG | WEIGHT: 153.8 LBS | DIASTOLIC BLOOD PRESSURE: 76 MMHG | BODY MASS INDEX: 21.53 KG/M2 | HEIGHT: 71 IN

## 2020-02-26 DIAGNOSIS — K51.511 LEFT SIDED COLITIS WITH RECTAL BLEEDING (HCC): Primary | ICD-10-CM

## 2020-02-26 DIAGNOSIS — R19.5 MUCUS IN STOOL: ICD-10-CM

## 2020-02-26 DIAGNOSIS — R19.7 DIARRHEA, UNSPECIFIED TYPE: ICD-10-CM

## 2020-02-26 PROCEDURE — 99214 OFFICE O/P EST MOD 30 MIN: CPT | Performed by: NURSE PRACTITIONER

## 2020-02-26 RX ORDER — MESALAMINE 0.38 G/1
1500 CAPSULE, EXTENDED RELEASE ORAL DAILY
Qty: 120 CAPSULE | Refills: 3 | Status: SHIPPED | OUTPATIENT
Start: 2020-02-26 | End: 2020-03-10 | Stop reason: SDUPTHER
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
## 2020-02-27 ENCOUNTER — TELEPHONE (OUTPATIENT)
Dept: GASTROENTEROLOGY | Facility: CLINIC | Age: 80
End: 2020-02-27

## 2020-02-27 LAB
ALBUMIN SERPL-MCNC: 4.3 G/DL (ref 3.5–5.2)
ALBUMIN/GLOB SERPL: 1 G/DL
ALP SERPL-CCNC: 116 U/L (ref 39–117)
ALT SERPL-CCNC: 11 U/L (ref 1–41)
AST SERPL-CCNC: 16 U/L (ref 1–40)
BASOPHILS # BLD AUTO: 0.03 10*3/MM3 (ref 0–0.2)
BASOPHILS NFR BLD AUTO: 0.4 % (ref 0–1.5)
BILIRUB SERPL-MCNC: 0.5 MG/DL (ref 0.2–1.2)
BUN SERPL-MCNC: 13 MG/DL (ref 8–23)
BUN/CREAT SERPL: 14.4 (ref 7–25)
CALCIUM SERPL-MCNC: 9.8 MG/DL (ref 8.6–10.5)
CHLORIDE SERPL-SCNC: 98 MMOL/L (ref 98–107)
CO2 SERPL-SCNC: 25.7 MMOL/L (ref 22–29)
CREAT SERPL-MCNC: 0.9 MG/DL (ref 0.76–1.27)
CRP SERPL-MCNC: 0.83 MG/DL (ref 0–0.5)
EOSINOPHIL # BLD AUTO: 0.14 10*3/MM3 (ref 0–0.4)
EOSINOPHIL NFR BLD AUTO: 1.9 % (ref 0.3–6.2)
ERYTHROCYTE [DISTWIDTH] IN BLOOD BY AUTOMATED COUNT: 13 % (ref 12.3–15.4)
ERYTHROCYTE [SEDIMENTATION RATE] IN BLOOD BY WESTERGREN METHOD: 48 MM/HR (ref 0–20)
GLOBULIN SER CALC-MCNC: 4.5 GM/DL
GLUCOSE SERPL-MCNC: 82 MG/DL (ref 65–99)
HCT VFR BLD AUTO: 45 % (ref 37.5–51)
HGB BLD-MCNC: 15.1 G/DL (ref 13–17.7)
IMM GRANULOCYTES # BLD AUTO: 0.03 10*3/MM3 (ref 0–0.05)
IMM GRANULOCYTES NFR BLD AUTO: 0.4 % (ref 0–0.5)
LYMPHOCYTES # BLD AUTO: 2.54 10*3/MM3 (ref 0.7–3.1)
LYMPHOCYTES NFR BLD AUTO: 33.6 % (ref 19.6–45.3)
MCH RBC QN AUTO: 30 PG (ref 26.6–33)
MCHC RBC AUTO-ENTMCNC: 33.6 G/DL (ref 31.5–35.7)
MCV RBC AUTO: 89.3 FL (ref 79–97)
MONOCYTES # BLD AUTO: 1.11 10*3/MM3 (ref 0.1–0.9)
MONOCYTES NFR BLD AUTO: 14.7 % (ref 5–12)
NEUTROPHILS # BLD AUTO: 3.7 10*3/MM3 (ref 1.7–7)
NEUTROPHILS NFR BLD AUTO: 49 % (ref 42.7–76)
NRBC BLD AUTO-RTO: 0 /100 WBC (ref 0–0.2)
PLATELET # BLD AUTO: 275 10*3/MM3 (ref 140–450)
POTASSIUM SERPL-SCNC: 5 MMOL/L (ref 3.5–5.2)
PROT SERPL-MCNC: 8.8 G/DL (ref 6–8.5)
RBC # BLD AUTO: 5.04 10*6/MM3 (ref 4.14–5.8)
SODIUM SERPL-SCNC: 138 MMOL/L (ref 136–145)
WBC # BLD AUTO: 7.55 10*3/MM3 (ref 3.4–10.8)

## 2020-02-27 NOTE — TELEPHONE ENCOUNTER
Called pt and advised of Lubna's note. Pt verb understanding and states he will call with an update on Monday.

## 2020-02-27 NOTE — TELEPHONE ENCOUNTER
----- Message from RAYMOND Santizo sent at 2/27/2020  9:04 AM EST -----  Please call the patient and let him know his labs show a normal white count and normal hemoglobin however his inflammatory markers which indicate inflammation are definitely elevated.  Make sure he goes to 4 tabs a day with the apriso.  Have him call us back on Monday with an update.  If he is not any better by Monday we will need to start prednisone.  Thanks

## 2020-03-02 ENCOUNTER — TELEPHONE (OUTPATIENT)
Dept: GASTROENTEROLOGY | Facility: CLINIC | Age: 80
End: 2020-03-02

## 2020-03-02 NOTE — TELEPHONE ENCOUNTER
----- Message from April Alejandro sent at 3/2/2020  2:49 PM EST -----  Regarding: update  Contact: [phone]  Pt returning call regarding an update

## 2020-03-02 NOTE — TELEPHONE ENCOUNTER
"Call to pt.  Updating as instructed with call of 2/27.  States since taking med \"the way I'm supposed to , getting progressively better\".  Will keep 3/10 appt.     Update to GUNNAR Uribe.   "

## 2020-03-10 ENCOUNTER — OFFICE VISIT (OUTPATIENT)
Dept: GASTROENTEROLOGY | Facility: CLINIC | Age: 80
End: 2020-03-10

## 2020-03-10 VITALS
DIASTOLIC BLOOD PRESSURE: 82 MMHG | BODY MASS INDEX: 21.5 KG/M2 | TEMPERATURE: 97.9 F | WEIGHT: 153.6 LBS | SYSTOLIC BLOOD PRESSURE: 128 MMHG | HEIGHT: 71 IN

## 2020-03-10 DIAGNOSIS — K51.511 LEFT SIDED COLITIS WITH RECTAL BLEEDING (HCC): Primary | ICD-10-CM

## 2020-03-10 DIAGNOSIS — R19.7 DIARRHEA, UNSPECIFIED TYPE: ICD-10-CM

## 2020-03-10 PROCEDURE — 99214 OFFICE O/P EST MOD 30 MIN: CPT | Performed by: NURSE PRACTITIONER

## 2020-03-10 RX ORDER — MESALAMINE 0.38 G/1
1500 CAPSULE, EXTENDED RELEASE ORAL DAILY
Qty: 120 CAPSULE | Refills: 11 | Status: SHIPPED | OUTPATIENT
Start: 2020-03-10 | End: 2020-08-25 | Stop reason: SDUPTHER
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
## 2020-03-22 RX ORDER — ALPRAZOLAM 0.25 MG/1
TABLET ORAL
Qty: 30 TABLET | Refills: 0 | Status: SHIPPED | OUTPATIENT
Start: 2020-03-22 | End: 2020-04-20

## 2020-04-20 RX ORDER — ALPRAZOLAM 0.25 MG/1
TABLET ORAL
Qty: 30 TABLET | Refills: 0 | Status: SHIPPED | OUTPATIENT
Start: 2020-04-20 | End: 2020-05-11

## 2020-04-22 ENCOUNTER — TELEPHONE (OUTPATIENT)
Dept: GASTROENTEROLOGY | Facility: CLINIC | Age: 80
End: 2020-04-22

## 2020-04-22 NOTE — TELEPHONE ENCOUNTER
Call to pt.  States for past 2-3 wks, having 4-5 slimy loose stools/day accompanied by gas.  Denies blood, cramping or pain, fever.  Concerned re: increased bowel frequency.     Maintaining apriso 4 tabs daily - takes 2 in am, 2 in pm.     Update to GUNNAR Cannon

## 2020-04-22 NOTE — TELEPHONE ENCOUNTER
----- Message from Sloane Lopez sent at 4/22/2020 10:33 AM EDT -----  Regarding: Update   Contact: [phone]  Changed medication to mesalamine    Pt states he is still going to the bathroom 5/6 times, still slimy, no regular bowel movement.

## 2020-04-23 NOTE — TELEPHONE ENCOUNTER
Call to pt.  Advise per GUNNAR Uribe note.      Verb understanding.  Phone visit scheduled for 4/27 @ 1:30 pm with DR Loza.

## 2020-04-27 ENCOUNTER — OFFICE VISIT (OUTPATIENT)
Dept: GASTROENTEROLOGY | Facility: CLINIC | Age: 80
End: 2020-04-27

## 2020-04-27 VITALS — WEIGHT: 165 LBS | BODY MASS INDEX: 23.1 KG/M2 | HEIGHT: 71 IN

## 2020-04-27 DIAGNOSIS — K62.5 RECTAL BLEEDING: ICD-10-CM

## 2020-04-27 DIAGNOSIS — K51.511 LEFT SIDED COLITIS WITH RECTAL BLEEDING (HCC): Primary | ICD-10-CM

## 2020-04-27 DIAGNOSIS — R19.4 CHANGE IN BOWEL HABITS: ICD-10-CM

## 2020-04-27 PROCEDURE — 99442 PR PHYS/QHP TELEPHONE EVALUATION 11-20 MIN: CPT | Performed by: INTERNAL MEDICINE

## 2020-04-27 RX ORDER — HYDROCORTISONE 100 MG/60ML
100 SUSPENSION RECTAL NIGHTLY
Qty: 28 ENEMA | Refills: 0 | Status: SHIPPED | OUTPATIENT
Start: 2020-04-27 | End: 2020-07-14 | Stop reason: SDUPTHER

## 2020-04-27 NOTE — PROGRESS NOTES
"Chief Complaint   Patient presents with   • Diarrhea   • Rectal Bleeding     Subjective     HPI  Jayden Marquez is a 80 y.o. male who presents for follow-up of left-sided colitis with diarrhea and rectal bleeding.    Today's visit is via telephone call due to the COVID-19 pandemic    You have chosen to receive care through a telephone visit. Do you consent to use a telephone visit for your medical care today? Yes    He saw Geri in March.  At that point, he had been doing better.  Apparently at the prior visit, he was not taking the Apriso as directed.  However, he says after that, his symptoms started to worsen again.  He says \"I am doing no good.\"  He says bowel movements are still slimy and squirting up to 3-4 times a day.  Occasionally he will see some blood.  There is no associated cramping or abdominal pain.  He is not having any nocturnal stools.  He has no nausea or vomiting.  He denies that he has had any weight loss.    He is taking the at present 2 in the morning and 2 in the evening.  He is not taking any antidiarrheals.  He is no longer on any hydrocortisone enemas nor does he have any of those left.      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   • H/O Paresthesia     Foot and stump   • History of weight loss        Social History     Socioeconomic History   • Marital status:      Spouse name: Gracia   • Number of children: Not on file   • Years of education: Not on file   • Highest education level: Not on file   Occupational History     Employer: RETIRED   Tobacco Use   • Smoking status: Current Every Day Smoker     Packs/day: 0.50     Years: 50.00     Pack years: 25.00     Types: Cigarettes   • Smokeless tobacco: Never Used   • Tobacco comment: will wean not want med currently smoking 6 cigarettes a day continue to wean down   Substance and Sexual Activity   • Alcohol use: No   • Drug use: No " "  • Sexual activity: Yes         Current Outpatient Medications:   •  ALPRAZolam (XANAX) 0.25 MG tablet, TAKE 1 TABLET BY MOUTH THREE TIMES DAILY AS NEEDED FOR ANXIETY, Disp: 30 tablet, Rfl: 0  •  diphenhydrAMINE-acetaminophen (TYLENOL PM)  MG tablet per tablet, Take 1 tablet by mouth At Night As Needed for Sleep., Disp: , Rfl:   •  FLUoxetine (PROzac) 20 MG capsule, TAKE 1 CAPSULE BY MOUTH EVERY DAY, Disp: 90 capsule, Rfl: 0  •  hydrOXYzine (ATARAX) 25 MG tablet, Take 1 tablet by mouth Every 8 (Eight) Hours As Needed for Anxiety., Disp: 30 tablet, Rfl: 6  •  mesalamine (APRISO) 0.375 g 24 hr capsule, Take 4 capsules by mouth Daily., Disp: 120 capsule, Rfl: 11  •  hydrocortisone (CORTENEMA) 100 MG/60ML enema, Insert 1 enema into the rectum Every Night., Disp: 28 enema, Rfl: 0    Review of Systems   Constitutional: Negative for activity change, appetite change and fatigue.   HENT: Negative for sore throat and trouble swallowing.    Respiratory: Negative.    Cardiovascular: Negative.    Gastrointestinal: Positive for diarrhea. Negative for abdominal distention, abdominal pain and blood in stool.        Mucousy stool   Endocrine: Negative for cold intolerance and heat intolerance.   Genitourinary: Negative for difficulty urinating, dysuria and frequency.   Musculoskeletal: Negative for arthralgias, back pain and myalgias.   Skin: Negative.    Hematological: Negative for adenopathy. Does not bruise/bleed easily.   All other systems reviewed and are negative.      Objective   There were no vitals filed for this visit.      04/27/20  1250   Weight: 74.8 kg (165 lb)     Body mass index is 23.01 kg/m².          Ht 180.3 cm (71\")   Wt 74.8 kg (165 lb)   BMI 23.01 kg/m²     Ears:   Hearing is intact   Psychiatric: Alert and oriented x 3, normal mood and affect,  normal recent and remote memory, normal judgment and insight         WBC   Date Value Ref Range Status   02/26/2020 7.55 3.40 - 10.80 10*3/mm3 Final     RBC "   Date Value Ref Range Status   02/26/2020 5.04 4.14 - 5.80 10*6/mm3 Final     Hemoglobin   Date Value Ref Range Status   02/26/2020 15.1 13.0 - 17.7 g/dL Final   02/17/2020 15.5 13.0 - 17.7 g/dL Final     Hematocrit   Date Value Ref Range Status   02/26/2020 45.0 37.5 - 51.0 % Final   02/17/2020 47.8 37.5 - 51.0 % Final     MCV   Date Value Ref Range Status   02/26/2020 89.3 79.0 - 97.0 fL Final   02/17/2020 90.6 79.0 - 97.0 fL Final     MCH   Date Value Ref Range Status   02/26/2020 30.0 26.6 - 33.0 pg Final   02/17/2020 29.3 26.6 - 33.0 pg Final     MCHC   Date Value Ref Range Status   02/26/2020 33.6 31.5 - 35.7 g/dL Final   02/17/2020 32.3 31.5 - 35.7 g/dL Final     RDW   Date Value Ref Range Status   02/26/2020 13.0 12.3 - 15.4 % Final   02/17/2020 13.8 12.3 - 15.4 % Final     RDW-SD   Date Value Ref Range Status   11/12/2018 44.3 37.0 - 49.0 fl Final     MPV   Date Value Ref Range Status   02/17/2020 6.8 6.0 - 12.0 fL Final     Platelets   Date Value Ref Range Status   02/26/2020 275 140 - 450 10*3/mm3 Final   02/17/2020 303 140 - 450 10*3/mm3 Final     Neutrophil Rel %   Date Value Ref Range Status   02/26/2020 49.0 42.7 - 76.0 % Final     Neutrophil %   Date Value Ref Range Status   02/17/2020 55.7 42.7 - 76.0 % Final     Lymphocyte Rel %   Date Value Ref Range Status   02/26/2020 33.6 19.6 - 45.3 % Final     Lymphocyte %   Date Value Ref Range Status   02/17/2020 35.1 19.6 - 45.3 % Final     Monocyte Rel %   Date Value Ref Range Status   02/26/2020 14.7 (H) 5.0 - 12.0 % Final     Monocyte %   Date Value Ref Range Status   02/17/2020 9.2 5.0 - 12.0 % Final     Eosinophil Rel %   Date Value Ref Range Status   02/26/2020 1.9 0.3 - 6.2 % Final     Eosinophil %   Date Value Ref Range Status   11/12/2018 3.9 1.0 - 5.0 % Final     Basophil Rel %   Date Value Ref Range Status   02/26/2020 0.4 0.0 - 1.5 % Final     Basophil %   Date Value Ref Range Status   11/12/2018 0.7 0.0 - 1.1 % Final     Immature Grans %    Date Value Ref Range Status   11/12/2018 0.6 (H) 0.0 - 0.5 % Final     Neutrophils Absolute   Date Value Ref Range Status   02/26/2020 3.70 1.70 - 7.00 10*3/mm3 Final     Neutrophils, Absolute   Date Value Ref Range Status   02/17/2020 4.20 1.70 - 7.00 10*3/mm3 Final     Lymphocytes Absolute   Date Value Ref Range Status   02/26/2020 2.54 0.70 - 3.10 10*3/mm3 Final     Lymphocytes, Absolute   Date Value Ref Range Status   02/17/2020 2.70 0.70 - 3.10 10*3/mm3 Final     Monocytes Absolute   Date Value Ref Range Status   02/26/2020 1.11 (H) 0.10 - 0.90 10*3/mm3 Final     Monocytes, Absolute   Date Value Ref Range Status   02/17/2020 0.70 0.10 - 0.90 10*3/mm3 Final     Eosinophils Absolute   Date Value Ref Range Status   02/26/2020 0.14 0.00 - 0.40 10*3/mm3 Final     Eosinophils, Absolute   Date Value Ref Range Status   11/12/2018 0.33 0.00 - 0.36 10*3/mm3 Final     Basophils Absolute   Date Value Ref Range Status   02/26/2020 0.03 0.00 - 0.20 10*3/mm3 Final     Basophils, Absolute   Date Value Ref Range Status   11/12/2018 0.06 0.00 - 0.10 10*3/mm3 Final     Immature Grans, Absolute   Date Value Ref Range Status   11/12/2018 0.05 (H) 0.00 - 0.03 10*3/mm3 Final     nRBC   Date Value Ref Range Status   02/26/2020 0.0 0.0 - 0.2 /100 WBC Final   11/12/2018 0.0 0.0 - 0.0 /100 WBC Final       Lab Results   Component Value Date    GLUCOSE 137 (H) 07/08/2019    BUN 13 02/26/2020    CREATININE 0.90 02/26/2020    EGFRIFNONA 82 02/26/2020    EGFRIFAFRI 99 02/26/2020    BCR 14.4 02/26/2020    CO2 25.7 02/26/2020    CALCIUM 9.8 02/26/2020    PROTENTOTREF 8.8 (H) 02/26/2020    ALBUMIN 4.30 02/26/2020    LABIL2 1.0 02/26/2020    AST 16 02/26/2020    ALT 11 02/26/2020         Imaging Results (Last 7 Days)     ** No results found for the last 168 hours. **            Assessment/Plan    Change in bowel habits: Currently worsened but he had been doing okay previously.  Not on any antidiarrheals    Left-sided colitis: As per his  September colonoscopy, extends to about 2 feet through the sigmoid colon    Rectal bleeding: With above, scant amounts.  Recent reassuring hemoglobin level    Plan  Given the COVID-19 pandemic and his age, I am reluctant to put him on prednisone.  I do think it is reasonable to put him on hydrocortisone enemas for topical steroid to see if this settles things down.    Continue the mesalamine    Advised okay for Imodium up to 3 times daily to settle down frequency of bowel movements    May need to consider something like budesonide if his insurance will allow versus prednisone when worries about COVID have settled down    Will have him follow-up in about a month after he is completed the enemas    15 minutes was spent in discussion with him regarding his symptoms and the plan as detailed above    Jayden was seen today for diarrhea and rectal bleeding.    Diagnoses and all orders for this visit:    Left sided colitis with rectal bleeding (CMS/HCC)    Change in bowel habits    Rectal bleeding    Other orders  -     hydrocortisone (CORTENEMA) 100 MG/60ML enema; Insert 1 enema into the rectum Every Night.        Dictated utilizing Dragon dictation

## 2020-04-28 ENCOUNTER — TELEPHONE (OUTPATIENT)
Dept: GASTROENTEROLOGY | Facility: CLINIC | Age: 80
End: 2020-04-28

## 2020-04-28 NOTE — TELEPHONE ENCOUNTER
----- Message from Celina Loza MD sent at 4/27/2020  1:27 PM EDT -----  1 month follow up  only, x

## 2020-05-11 RX ORDER — ALPRAZOLAM 0.25 MG/1
TABLET ORAL
Qty: 30 TABLET | Refills: 0 | Status: SHIPPED | OUTPATIENT
Start: 2020-05-11 | End: 2020-05-28

## 2020-05-12 RX ORDER — PROPRANOLOL HYDROCHLORIDE 20 MG/1
TABLET ORAL
Qty: 180 TABLET | Refills: 0 | Status: SHIPPED | OUTPATIENT
Start: 2020-05-12 | End: 2020-07-08

## 2020-05-12 RX ORDER — FLUOXETINE HYDROCHLORIDE 20 MG/1
CAPSULE ORAL
Qty: 90 CAPSULE | Refills: 0 | Status: SHIPPED | OUTPATIENT
Start: 2020-05-12 | End: 2020-09-14 | Stop reason: SDUPTHER

## 2020-05-28 RX ORDER — ALPRAZOLAM 0.25 MG/1
TABLET ORAL
Qty: 30 TABLET | Refills: 0 | Status: SHIPPED | OUTPATIENT
Start: 2020-05-28 | End: 2020-06-18

## 2020-06-10 ENCOUNTER — OFFICE VISIT (OUTPATIENT)
Dept: GASTROENTEROLOGY | Facility: CLINIC | Age: 80
End: 2020-06-10

## 2020-06-10 VITALS — TEMPERATURE: 98.6 F | HEIGHT: 71 IN | BODY MASS INDEX: 21.18 KG/M2 | WEIGHT: 151.3 LBS

## 2020-06-10 DIAGNOSIS — R15.2 FECAL URGENCY: ICD-10-CM

## 2020-06-10 DIAGNOSIS — R19.4 CHANGE IN BOWEL HABITS: ICD-10-CM

## 2020-06-10 DIAGNOSIS — K51.511 LEFT SIDED COLITIS WITH RECTAL BLEEDING (HCC): Primary | ICD-10-CM

## 2020-06-10 DIAGNOSIS — K62.5 RECTAL BLEEDING: ICD-10-CM

## 2020-06-10 PROCEDURE — 99214 OFFICE O/P EST MOD 30 MIN: CPT | Performed by: INTERNAL MEDICINE

## 2020-06-10 NOTE — PROGRESS NOTES
Chief Complaint   Patient presents with   • Follow-up   • Ulcerative Colitis   • Rectal Bleeding     Subjective     HPI  Jayden Marquez is a 80 y.o. male who presents for follow up of left sided colitis, mucousy stool and rectal bleeding.  He was seen on April 27 via telemedicine visit.  At that point he felt he was not doing well.  Still with urgent stool, passing mucus and occasional blood.  I had him start nightly hydrocortisone enemas.  He completed 28 days worth.    Doing better following hydrocortisone enemas.  No further blood nor mucous.  Having about 2-3 formed BMs per day.  Occasional urgency.  No nocturnal stools remains on mesalamine.  Denies abdominal pain, cramping, nausea, vomiting.  Weight is stable to his last in office visit.    He is not taking any antidiarrheals.    Today's visit was in the office.  Both the patient and I were wearing face masks and proper hand hygiene was performed before and after the physical exam.     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   • H/O Paresthesia     Foot and stump   • History of weight loss        Social History     Socioeconomic History   • Marital status:      Spouse name: Gracia   • Number of children: Not on file   • Years of education: Not on file   • Highest education level: Not on file   Occupational History     Employer: RETIRED   Tobacco Use   • Smoking status: Current Every Day Smoker     Packs/day: 0.50     Years: 50.00     Pack years: 25.00     Types: Cigarettes   • Smokeless tobacco: Never Used   • Tobacco comment: will wean not want med currently smoking 6 cigarettes a day continue to wean down   Substance and Sexual Activity   • Alcohol use: No   • Drug use: No   • Sexual activity: Yes         Current Outpatient Medications:   •  ALPRAZolam (XANAX) 0.25 MG tablet, TAKE 1 TABLET BY MOUTH THREE TIMES DAILY AS NEEDED FOR ANXIETY, Disp: 30  "tablet, Rfl: 0  •  diphenhydrAMINE-acetaminophen (TYLENOL PM)  MG tablet per tablet, Take 1 tablet by mouth At Night As Needed for Sleep., Disp: , Rfl:   •  FLUoxetine (PROzac) 20 MG capsule, TAKE 1 CAPSULE BY MOUTH EVERY DAY, Disp: 90 capsule, Rfl: 0  •  hydrOXYzine (ATARAX) 25 MG tablet, Take 1 tablet by mouth Every 8 (Eight) Hours As Needed for Anxiety., Disp: 30 tablet, Rfl: 6  •  hydrocortisone (CORTENEMA) 100 MG/60ML enema, Insert 1 enema into the rectum Every Night., Disp: 28 enema, Rfl: 0  •  mesalamine (APRISO) 0.375 g 24 hr capsule, Take 4 capsules by mouth Daily., Disp: 120 capsule, Rfl: 11  •  propranolol (INDERAL) 20 MG tablet, TAKE HALF TO 1 TABLET BY MOUTH EVERY MORNING AND AT NOON, Disp: 180 tablet, Rfl: 0    Review of Systems   Constitutional: Negative for activity change, appetite change and fatigue.   HENT: Negative for sore throat and trouble swallowing.    Respiratory: Negative.    Cardiovascular: Negative.    Gastrointestinal: Negative for abdominal distention, abdominal pain and blood in stool.        Occasional fecal urgency   Endocrine: Negative for cold intolerance and heat intolerance.   Genitourinary: Negative for difficulty urinating, dysuria and frequency.   Musculoskeletal: Negative for arthralgias, back pain and myalgias.   Skin: Negative.    Hematological: Negative for adenopathy. Does not bruise/bleed easily.   All other systems reviewed and are negative.      Objective   Vitals:    06/10/20 1249   Temp: 98.6 °F (37 °C)         06/10/20  1249   Weight: 68.6 kg (151 lb 4.8 oz)     Body mass index is 21.1 kg/m².          Temp 98.6 °F (37 °C)   Ht 180.3 cm (71\")   Wt 68.6 kg (151 lb 4.8 oz)   BMI 21.10 kg/m²     General Appearance:  Alert, cooperative, no distress, appears stated age   Head:  Normocephalic, without obvious abnormality, atraumatic   Eyes:  PERRL, conjunctiva/corneas clear, EOM's intact   Ears:  Normal external appearance of ear, hearing intact   Nose: Nares " normal,mucosa normal, no drainage or sinus tenderness   Throat: Lips, mucosa, and tongue normal; teeth and gums normal   Neck: Supple, symmetrical, trachea midline, no adenopathy;  thyroid: not enlarged, symmetric, no tenderness/mass/nodule   Back:   Symmetric, no curvature, ROM normal   Lungs:   Clear to auscultation bilaterally, respirations unlabored, effort normal   Heart:  Regular rate and rhythm, S1 and S2 normal, no murmur, rub, or gallop, no lower extremity edema   Abdomen:   Soft, non-tender, bowel sounds active all four quadrants,  no masses, no organomegaly   Rectal: Deferred   Musculoskeletal:  Antalgic gait, normal station, no atrophy of extremities, normal strength and tone   Skin: Normal color, texture, and turgor, no rashes or lesions   Lymph nodes: Cervical and supraclavicular nodes normal   Psychiatric: Alert and oriented x 3, normal mood and affect,  normal recent and remote memory, normal judgment and insight         WBC   Date Value Ref Range Status   02/26/2020 7.55 3.40 - 10.80 10*3/mm3 Final     RBC   Date Value Ref Range Status   02/26/2020 5.04 4.14 - 5.80 10*6/mm3 Final     Hemoglobin   Date Value Ref Range Status   02/26/2020 15.1 13.0 - 17.7 g/dL Final   02/17/2020 15.5 13.0 - 17.7 g/dL Final     Hematocrit   Date Value Ref Range Status   02/26/2020 45.0 37.5 - 51.0 % Final   02/17/2020 47.8 37.5 - 51.0 % Final     MCV   Date Value Ref Range Status   02/26/2020 89.3 79.0 - 97.0 fL Final   02/17/2020 90.6 79.0 - 97.0 fL Final     MCH   Date Value Ref Range Status   02/26/2020 30.0 26.6 - 33.0 pg Final   02/17/2020 29.3 26.6 - 33.0 pg Final     MCHC   Date Value Ref Range Status   02/26/2020 33.6 31.5 - 35.7 g/dL Final   02/17/2020 32.3 31.5 - 35.7 g/dL Final     RDW   Date Value Ref Range Status   02/26/2020 13.0 12.3 - 15.4 % Final   02/17/2020 13.8 12.3 - 15.4 % Final     RDW-SD   Date Value Ref Range Status   11/12/2018 44.3 37.0 - 49.0 fl Final     MPV   Date Value Ref Range Status    02/17/2020 6.8 6.0 - 12.0 fL Final     Platelets   Date Value Ref Range Status   02/26/2020 275 140 - 450 10*3/mm3 Final   02/17/2020 303 140 - 450 10*3/mm3 Final     Neutrophil Rel %   Date Value Ref Range Status   02/26/2020 49.0 42.7 - 76.0 % Final     Neutrophil %   Date Value Ref Range Status   02/17/2020 55.7 42.7 - 76.0 % Final     Lymphocyte Rel %   Date Value Ref Range Status   02/26/2020 33.6 19.6 - 45.3 % Final     Lymphocyte %   Date Value Ref Range Status   02/17/2020 35.1 19.6 - 45.3 % Final     Monocyte Rel %   Date Value Ref Range Status   02/26/2020 14.7 (H) 5.0 - 12.0 % Final     Monocyte %   Date Value Ref Range Status   02/17/2020 9.2 5.0 - 12.0 % Final     Eosinophil Rel %   Date Value Ref Range Status   02/26/2020 1.9 0.3 - 6.2 % Final     Eosinophil %   Date Value Ref Range Status   11/12/2018 3.9 1.0 - 5.0 % Final     Basophil Rel %   Date Value Ref Range Status   02/26/2020 0.4 0.0 - 1.5 % Final     Basophil %   Date Value Ref Range Status   11/12/2018 0.7 0.0 - 1.1 % Final     Immature Grans %   Date Value Ref Range Status   11/12/2018 0.6 (H) 0.0 - 0.5 % Final     Neutrophils Absolute   Date Value Ref Range Status   02/26/2020 3.70 1.70 - 7.00 10*3/mm3 Final     Neutrophils, Absolute   Date Value Ref Range Status   02/17/2020 4.20 1.70 - 7.00 10*3/mm3 Final     Lymphocytes Absolute   Date Value Ref Range Status   02/26/2020 2.54 0.70 - 3.10 10*3/mm3 Final     Lymphocytes, Absolute   Date Value Ref Range Status   02/17/2020 2.70 0.70 - 3.10 10*3/mm3 Final     Monocytes Absolute   Date Value Ref Range Status   02/26/2020 1.11 (H) 0.10 - 0.90 10*3/mm3 Final     Monocytes, Absolute   Date Value Ref Range Status   02/17/2020 0.70 0.10 - 0.90 10*3/mm3 Final     Eosinophils Absolute   Date Value Ref Range Status   02/26/2020 0.14 0.00 - 0.40 10*3/mm3 Final     Eosinophils, Absolute   Date Value Ref Range Status   11/12/2018 0.33 0.00 - 0.36 10*3/mm3 Final     Basophils Absolute   Date Value Ref  Range Status   02/26/2020 0.03 0.00 - 0.20 10*3/mm3 Final     Basophils, Absolute   Date Value Ref Range Status   11/12/2018 0.06 0.00 - 0.10 10*3/mm3 Final     Immature Grans, Absolute   Date Value Ref Range Status   11/12/2018 0.05 (H) 0.00 - 0.03 10*3/mm3 Final     nRBC   Date Value Ref Range Status   02/26/2020 0.0 0.0 - 0.2 /100 WBC Final   11/12/2018 0.0 0.0 - 0.0 /100 WBC Final       Lab Results   Component Value Date    GLUCOSE 137 (H) 07/08/2019    BUN 13 02/26/2020    CREATININE 0.90 02/26/2020    EGFRIFNONA 82 02/26/2020    EGFRIFAFRI 99 02/26/2020    BCR 14.4 02/26/2020    CO2 25.7 02/26/2020    CALCIUM 9.8 02/26/2020    PROTENTOTREF 8.8 (H) 02/26/2020    ALBUMIN 4.30 02/26/2020    LABIL2 1.0 02/26/2020    AST 16 02/26/2020    ALT 11 02/26/2020         Imaging Results (Last 7 Days)     ** No results found for the last 168 hours. **            Assessment/Plan    1.  Left-sided colitis: Consistent with UC.  He has been on mesalamine and most recently hydrocortisone enemas    2.  Rectal bleeding: Improved following hydrocortisone    3.  Change in bowel habits: Was having loose and mucousy stools.  Now improved.    4.  Fecal urgency: Intermittent episodes.  He struggles to get to the bathroom in time    Plan  Continue the mesalamine  I think he would do well to take 1 Imodium tablet every morning to reduce fecal urgency episodes  Continue to monitor stools, if the bleeding, mucus, looseness return then will do another short course of the hydrocortisone enemas  I will have him back to the office in 3 months.  At that point we can update his CBC, BMP, and CRP    Jayden was seen today for follow-up, ulcerative colitis and rectal bleeding.    Diagnoses and all orders for this visit:    Left sided colitis with rectal bleeding (CMS/HCC)    Rectal bleeding    Change in bowel habits    Fecal urgency        Dictated utilizing Dragon dictation

## 2020-06-18 RX ORDER — ALPRAZOLAM 0.25 MG/1
TABLET ORAL
Qty: 30 TABLET | Refills: 0 | Status: SHIPPED | OUTPATIENT
Start: 2020-06-18 | End: 2020-07-06

## 2020-07-06 RX ORDER — ALPRAZOLAM 0.25 MG/1
TABLET ORAL
Qty: 30 TABLET | Refills: 0 | Status: SHIPPED | OUTPATIENT
Start: 2020-07-06 | End: 2020-07-29

## 2020-07-07 ENCOUNTER — TELEPHONE (OUTPATIENT)
Dept: FAMILY MEDICINE CLINIC | Facility: CLINIC | Age: 80
End: 2020-07-07

## 2020-07-07 NOTE — TELEPHONE ENCOUNTER
I suspect it is for patient's essential tremor can be used for blood pressure anxiety stage fright heart disease and several other things.  My guess is Dr. Ibarra do that for essential tremor perhaps patient can tell us better on that one he is not to stop that you can consider weaning down it may be masking her blood pressure being over the appropriate time at this point in time as well.  It does not bother him I would simply have him continue take that because think the tremor would get worse if he discontinue that if he has concerns he can certainly see a neurologist to get

## 2020-07-07 NOTE — TELEPHONE ENCOUNTER
Pt wants to know why he is taking propanolol and if he needs to continue taking it needs a refill.

## 2020-07-07 NOTE — TELEPHONE ENCOUNTER
Patient is calling to request a call back, regarding the following medication:    propranolol (INDERAL) 20 MG tablet       Patient call back [phone]

## 2020-07-08 RX ORDER — PROPRANOLOL HYDROCHLORIDE 20 MG/1
TABLET ORAL
Qty: 180 TABLET | Refills: 0 | Status: SHIPPED | OUTPATIENT
Start: 2020-07-08 | End: 2020-11-04

## 2020-07-14 ENCOUNTER — TELEPHONE (OUTPATIENT)
Dept: GASTROENTEROLOGY | Facility: CLINIC | Age: 80
End: 2020-07-14

## 2020-07-14 ENCOUNTER — RESULTS ENCOUNTER (OUTPATIENT)
Dept: GASTROENTEROLOGY | Facility: CLINIC | Age: 80
End: 2020-07-14

## 2020-07-14 DIAGNOSIS — K62.5 RECTAL BLEEDING: Primary | ICD-10-CM

## 2020-07-14 DIAGNOSIS — K62.5 RECTAL BLEEDING: ICD-10-CM

## 2020-07-14 RX ORDER — HYDROCORTISONE 100 MG/60ML
100 SUSPENSION RECTAL NIGHTLY
Qty: 28 ENEMA | Refills: 0 | Status: SHIPPED | OUTPATIENT
Start: 2020-07-14 | End: 2020-08-25

## 2020-07-14 NOTE — TELEPHONE ENCOUNTER
"Call to pt.  Reports 7/11 had sense of urgency.  Passed large clot, and then \"just pure blood\".  This has not occurred since.  Having BM\"s without problem.  Denies cramping, pain, fever.  Eating and drinking without problem.  States feels weak.     Advise pt will update Dr Loza, but in the meantime - if symptoms worsen, or becomes lightheaded/dizzy/unsteady on feet - go to ER for immediate eval.  Verb understanding   "

## 2020-07-14 NOTE — TELEPHONE ENCOUNTER
Call to pt.  Advise per Dr Loza note  Verb understanding  Lab appt schedule for 7/15 @ 2pm. (lab order in place).

## 2020-07-14 NOTE — TELEPHONE ENCOUNTER
----- Message from Kim Romero sent at 7/13/2020  4:31 PM EDT -----  Contact: [phone]  Patient is requesting call back

## 2020-07-14 NOTE — TELEPHONE ENCOUNTER
Have him come in for an H&H.  To ER if bleeding worsens    I have ordered another course of the hydrocortisone enemas for him.  He should start taking those every night for the next 4 weeks.

## 2020-07-23 ENCOUNTER — OFFICE VISIT (OUTPATIENT)
Dept: GASTROENTEROLOGY | Facility: CLINIC | Age: 80
End: 2020-07-23

## 2020-07-23 VITALS — TEMPERATURE: 98.4 F | HEIGHT: 70 IN | BODY MASS INDEX: 21.62 KG/M2 | WEIGHT: 151 LBS

## 2020-07-23 DIAGNOSIS — R19.7 DIARRHEA, UNSPECIFIED TYPE: ICD-10-CM

## 2020-07-23 DIAGNOSIS — K51.511 LEFT SIDED COLITIS WITH RECTAL BLEEDING (HCC): Primary | ICD-10-CM

## 2020-07-23 DIAGNOSIS — R15.2 FECAL URGENCY: ICD-10-CM

## 2020-07-23 PROCEDURE — 99214 OFFICE O/P EST MOD 30 MIN: CPT | Performed by: NURSE PRACTITIONER

## 2020-07-23 RX ORDER — BUDESONIDE 3 MG/1
CAPSULE, COATED PELLETS ORAL
Qty: 154 CAPSULE | Refills: 1 | Status: SHIPPED | OUTPATIENT
Start: 2020-07-23 | End: 2020-08-25
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
## 2020-07-24 ENCOUNTER — TELEPHONE (OUTPATIENT)
Dept: GASTROENTEROLOGY | Facility: CLINIC | Age: 80
End: 2020-07-24

## 2020-07-24 LAB
ALBUMIN SERPL-MCNC: 4 G/DL (ref 3.5–5.2)
ALBUMIN/GLOB SERPL: 1 G/DL
ALP SERPL-CCNC: 102 U/L (ref 39–117)
ALT SERPL-CCNC: 11 U/L (ref 1–41)
AST SERPL-CCNC: 19 U/L (ref 1–40)
BASOPHILS # BLD AUTO: 0.05 10*3/MM3 (ref 0–0.2)
BASOPHILS NFR BLD AUTO: 0.6 % (ref 0–1.5)
BILIRUB SERPL-MCNC: 0.4 MG/DL (ref 0–1.2)
BUN SERPL-MCNC: 13 MG/DL (ref 8–23)
BUN/CREAT SERPL: 13.8 (ref 7–25)
CALCIUM SERPL-MCNC: 9.6 MG/DL (ref 8.6–10.5)
CHLORIDE SERPL-SCNC: 101 MMOL/L (ref 98–107)
CO2 SERPL-SCNC: 26.3 MMOL/L (ref 22–29)
CREAT SERPL-MCNC: 0.94 MG/DL (ref 0.76–1.27)
EOSINOPHIL # BLD AUTO: 0.09 10*3/MM3 (ref 0–0.4)
EOSINOPHIL NFR BLD AUTO: 1.1 % (ref 0.3–6.2)
ERYTHROCYTE [DISTWIDTH] IN BLOOD BY AUTOMATED COUNT: 13.4 % (ref 12.3–15.4)
GLOBULIN SER CALC-MCNC: 4.2 GM/DL
GLUCOSE SERPL-MCNC: 82 MG/DL (ref 65–99)
HCT VFR BLD AUTO: 43 % (ref 37.5–51)
HGB BLD-MCNC: 14.7 G/DL (ref 13–17.7)
IMM GRANULOCYTES # BLD AUTO: 0.03 10*3/MM3 (ref 0–0.05)
IMM GRANULOCYTES NFR BLD AUTO: 0.4 % (ref 0–0.5)
LYMPHOCYTES # BLD AUTO: 2.76 10*3/MM3 (ref 0.7–3.1)
LYMPHOCYTES NFR BLD AUTO: 33.4 % (ref 19.6–45.3)
MCH RBC QN AUTO: 30.2 PG (ref 26.6–33)
MCHC RBC AUTO-ENTMCNC: 34.2 G/DL (ref 31.5–35.7)
MCV RBC AUTO: 88.5 FL (ref 79–97)
MONOCYTES # BLD AUTO: 1.1 10*3/MM3 (ref 0.1–0.9)
MONOCYTES NFR BLD AUTO: 13.3 % (ref 5–12)
NEUTROPHILS # BLD AUTO: 4.24 10*3/MM3 (ref 1.7–7)
NEUTROPHILS NFR BLD AUTO: 51.2 % (ref 42.7–76)
NRBC BLD AUTO-RTO: 0 /100 WBC (ref 0–0.2)
PLATELET # BLD AUTO: 289 10*3/MM3 (ref 140–450)
POTASSIUM SERPL-SCNC: 4.5 MMOL/L (ref 3.5–5.2)
PROT SERPL-MCNC: 8.2 G/DL (ref 6–8.5)
RBC # BLD AUTO: 4.86 10*6/MM3 (ref 4.14–5.8)
SODIUM SERPL-SCNC: 139 MMOL/L (ref 136–145)
WBC # BLD AUTO: 8.27 10*3/MM3 (ref 3.4–10.8)

## 2020-07-24 RX ORDER — PREDNISONE 10 MG/1
TABLET ORAL
Qty: 80 TABLET | Refills: 1 | Status: SHIPPED | OUTPATIENT
Start: 2020-07-24 | End: 2020-08-25

## 2020-07-24 NOTE — TELEPHONE ENCOUNTER
Called pt 's wife who advised that the budesonide is too expensive at $200 and he can not afford this.  Advised will send message to Lubna SMITH.

## 2020-07-24 NOTE — TELEPHONE ENCOUNTER
Called pt and advised of Lubna's note. Advised we have escribed this to his [facility] pharmacy. Pt verb understanding.

## 2020-07-24 NOTE — TELEPHONE ENCOUNTER
----- Message from Sloane Ritter sent at 7/24/2020  9:20 AM EDT -----  Regarding: Budesonide (ENTOCORT EC) 3 MG 24 hr capsule   Contact: [phone]  Pt says he can not afford the medication.

## 2020-07-24 NOTE — TELEPHONE ENCOUNTER
Okay let us change the budesonide to a prednisone taper.  Let us do prednisone 10 mg with instructions to do 40 mg p.o. daily x1 week then 30 mg p.o. daily x1 week then 20 mg p.o. daily x1 week then 10 mg p.o. daily x1 week then stop.  Dispense 80 with 1 refill.  Have him call us next week with an update.

## 2020-07-29 RX ORDER — ALPRAZOLAM 0.25 MG/1
TABLET ORAL
Qty: 30 TABLET | Refills: 0 | Status: SHIPPED | OUTPATIENT
Start: 2020-07-29 | End: 2020-08-14

## 2020-07-31 ENCOUNTER — TELEPHONE (OUTPATIENT)
Dept: GASTROENTEROLOGY | Facility: CLINIC | Age: 80
End: 2020-07-31

## 2020-07-31 NOTE — TELEPHONE ENCOUNTER
----- Message from Grecia Tobias sent at 7/31/2020  9:32 AM EDT -----  Regarding: pt call re medicaijudah  Pt called to update Deepak on how he was doing on the Budesonide (ENTOCORT EC) 3 MG 24 hr capsule.  He said hes doing about the same please call [phone] to discuss medication and possible changes.

## 2020-07-31 NOTE — TELEPHONE ENCOUNTER
He should be taking 1-2 Imodium tablets every morning and then can take a second dose as needed later in the day.    Follow-up with his PCP regarding feeling weak and shaky.    Please change his follow-up appointment to be with me in August or early September, thanks

## 2020-07-31 NOTE — TELEPHONE ENCOUNTER
Call to pt.  States reporting update as instructed.  Taking budesonide 3 mg - 3 tabs/day as instructed 7/24.  Denies blood, cramping, pain, urgency, fever.  Continues to have 4-5 loose stools/day.  States generally does not feel well - weak and shaky.    Update to DR Loza.

## 2020-07-31 NOTE — TELEPHONE ENCOUNTER
Call to pt.  Advise per Dr Loza note.  Verb understanding.     Initially hesitant about changing appt - but then agreeable.  Scheduled for 8/25 @ 2pm with DR Loza.  8/27 with GUNNAR Uribe cancelled.

## 2020-08-14 RX ORDER — ALPRAZOLAM 0.25 MG/1
TABLET ORAL
Qty: 30 TABLET | Refills: 0 | Status: SHIPPED | OUTPATIENT
Start: 2020-08-14 | End: 2020-09-01

## 2020-08-25 ENCOUNTER — OFFICE VISIT (OUTPATIENT)
Dept: GASTROENTEROLOGY | Facility: CLINIC | Age: 80
End: 2020-08-25

## 2020-08-25 VITALS
DIASTOLIC BLOOD PRESSURE: 70 MMHG | HEIGHT: 70 IN | SYSTOLIC BLOOD PRESSURE: 118 MMHG | TEMPERATURE: 95 F | WEIGHT: 152.3 LBS | BODY MASS INDEX: 21.8 KG/M2

## 2020-08-25 DIAGNOSIS — K62.5 RECTAL BLEEDING: Primary | ICD-10-CM

## 2020-08-25 DIAGNOSIS — K51.511 LEFT SIDED COLITIS WITH RECTAL BLEEDING (HCC): ICD-10-CM

## 2020-08-25 DIAGNOSIS — R15.2 FECAL URGENCY: ICD-10-CM

## 2020-08-25 PROCEDURE — 99214 OFFICE O/P EST MOD 30 MIN: CPT | Performed by: INTERNAL MEDICINE

## 2020-08-25 RX ORDER — MESALAMINE 0.38 G/1
1500 CAPSULE, EXTENDED RELEASE ORAL DAILY
Qty: 120 CAPSULE | Refills: 11 | Status: SHIPPED | OUTPATIENT
Start: 2020-08-25 | End: 2020-09-24 | Stop reason: SDUPTHER

## 2020-08-25 NOTE — PROGRESS NOTES
"Chief Complaint   Patient presents with   • Colitis     Subjective     HPI  Jayden Marquez is a 80 y.o. male who presents for follow up of left sided colitis, mucousy stool and rectal bleeding.     At the beginning of our appointment, he says he is \"not happy with me and not feeling well.\"  He is upset that his appointment with Geri was rescheduled following his last call to the office regarding not improvement.  Upon further questioning he says that his bowel movements are doing \"pretty good\" as he is not having any \"slime or blood\" in his stools.  He is having about 2-3 bowel movements per day.    Labs checked from the end of July show a normal hemoglobin level.    Geri had him complete a course of budesonide which he just finished.  He is also now out of mesalamine but has been taking 1.5 g daily.    His weight is stable compared back to March of this year.    He says he is eating and drinking well.    His last colonoscopy for symptoms was September 2019.        Today's visit was in the office.  Both the patient and I were wearing face masks and proper hand hygiene was performed before and after the physical exam.     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   • H/O Paresthesia     Foot and stump   • History of weight loss        Social History     Socioeconomic History   • Marital status:      Spouse name: Gracia   • Number of children: Not on file   • Years of education: Not on file   • Highest education level: Not on file   Occupational History     Employer: RETIRED   Tobacco Use   • Smoking status: Current Every Day Smoker     Packs/day: 0.50     Years: 50.00     Pack years: 25.00     Types: Cigarettes   • Smokeless tobacco: Never Used   • Tobacco comment: will wean not want med currently smoking 6 cigarettes a day continue to wean down   Substance and Sexual Activity   • Alcohol use: No   • Drug use: " "No   • Sexual activity: Yes         Current Outpatient Medications:   •  ALPRAZolam (XANAX) 0.25 MG tablet, TAKE 1 TABLET BY MOUTH THREE TIMES DAILY AS NEEDED FOR ANXIETY, Disp: 30 tablet, Rfl: 0  •  diphenhydrAMINE-acetaminophen (TYLENOL PM)  MG tablet per tablet, Take 1 tablet by mouth At Night As Needed for Sleep., Disp: , Rfl:   •  FLUoxetine (PROzac) 20 MG capsule, TAKE 1 CAPSULE BY MOUTH EVERY DAY, Disp: 90 capsule, Rfl: 0  •  hydrOXYzine (ATARAX) 25 MG tablet, Take 1 tablet by mouth Every 8 (Eight) Hours As Needed for Anxiety., Disp: 30 tablet, Rfl: 6  •  mesalamine (Apriso) 0.375 g 24 hr capsule, Take 4 capsules by mouth Daily., Disp: 120 capsule, Rfl: 11  •  propranolol (INDERAL) 20 MG tablet, TAKE HALF TO 1 TABLET BY MOUTH EVERY MORNING AND AT NOON, Disp: 180 tablet, Rfl: 0    Review of Systems   Constitutional: Negative for activity change, appetite change and fatigue.   HENT: Negative for sore throat and trouble swallowing.    Respiratory: Negative.    Cardiovascular: Negative.    Gastrointestinal: Negative for abdominal distention, abdominal pain and blood in stool.   Endocrine: Negative for cold intolerance and heat intolerance.   Genitourinary: Negative for difficulty urinating, dysuria and frequency.   Musculoskeletal: Negative for arthralgias, back pain and myalgias.   Skin: Negative.    Hematological: Negative for adenopathy. Does not bruise/bleed easily.   All other systems reviewed and are negative.      Objective   Vitals:    08/25/20 1354   BP: 118/70   Temp: 95 °F (35 °C)         08/25/20  1354   Weight: 69.1 kg (152 lb 4.8 oz)     Body mass index is 21.85 kg/m².          /70   Temp 95 °F (35 °C)   Ht 177.8 cm (70\")   Wt 69.1 kg (152 lb 4.8 oz)   BMI 21.85 kg/m²     General Appearance:  Alert, cooperative, no distress, appears stated age, thin   Head:  Normocephalic, without obvious abnormality, atraumatic   Eyes:  PERRL, conjunctiva/corneas clear, EOM's intact   Ears:  Normal " external appearance of ear, hearing intact   Nose: Nares normal,mucosa normal, no drainage or sinus tenderness   Throat: Lips, mucosa, and tongue normal; teeth and gums normal   Neck: Supple, symmetrical, trachea midline, no adenopathy;  thyroid: not enlarged, symmetric, no tenderness/mass/nodule   Back:   Symmetric, no curvature, ROM normal   Lungs:   Clear to auscultation bilaterally, respirations unlabored, effort normal   Heart:  Regular rate and rhythm, S1 and S2 normal, no murmur, rub, or gallop, no lower extremity edema   Abdomen:   Soft, non-tender, bowel sounds active all four quadrants,  no masses, no organomegaly   Rectal: Deferred   Musculoskeletal:  Antalgic gait, normal station, no atrophy of extremities, normal strength and tone; he has a left BKA   Skin: Normal color, texture, and turgor, no rashes or lesions   Lymph nodes: Cervical and supraclavicular nodes normal   Psychiatric: Alert and oriented x 3, normal mood and affect,  normal recent and remote memory, normal judgment and insight         WBC   Date Value Ref Range Status   07/23/2020 8.27 3.40 - 10.80 10*3/mm3 Final     RBC   Date Value Ref Range Status   07/23/2020 4.86 4.14 - 5.80 10*6/mm3 Final     Hemoglobin   Date Value Ref Range Status   07/23/2020 14.7 13.0 - 17.7 g/dL Final   02/17/2020 15.5 13.0 - 17.7 g/dL Final     Hematocrit   Date Value Ref Range Status   07/23/2020 43.0 37.5 - 51.0 % Final   02/17/2020 47.8 37.5 - 51.0 % Final     MCV   Date Value Ref Range Status   07/23/2020 88.5 79.0 - 97.0 fL Final   02/17/2020 90.6 79.0 - 97.0 fL Final     MCH   Date Value Ref Range Status   07/23/2020 30.2 26.6 - 33.0 pg Final   02/17/2020 29.3 26.6 - 33.0 pg Final     MCHC   Date Value Ref Range Status   07/23/2020 34.2 31.5 - 35.7 g/dL Final   02/17/2020 32.3 31.5 - 35.7 g/dL Final     RDW   Date Value Ref Range Status   07/23/2020 13.4 12.3 - 15.4 % Final   02/17/2020 13.8 12.3 - 15.4 % Final     RDW-SD   Date Value Ref Range Status    11/12/2018 44.3 37.0 - 49.0 fl Final     MPV   Date Value Ref Range Status   02/17/2020 6.8 6.0 - 12.0 fL Final     Platelets   Date Value Ref Range Status   07/23/2020 289 140 - 450 10*3/mm3 Final   02/17/2020 303 140 - 450 10*3/mm3 Final     Neutrophil Rel %   Date Value Ref Range Status   07/23/2020 51.2 42.7 - 76.0 % Final     Neutrophil %   Date Value Ref Range Status   02/17/2020 55.7 42.7 - 76.0 % Final     Lymphocyte Rel %   Date Value Ref Range Status   07/23/2020 33.4 19.6 - 45.3 % Final     Lymphocyte %   Date Value Ref Range Status   02/17/2020 35.1 19.6 - 45.3 % Final     Monocyte Rel %   Date Value Ref Range Status   07/23/2020 13.3 (H) 5.0 - 12.0 % Final     Monocyte %   Date Value Ref Range Status   02/17/2020 9.2 5.0 - 12.0 % Final     Eosinophil Rel %   Date Value Ref Range Status   07/23/2020 1.1 0.3 - 6.2 % Final     Eosinophil %   Date Value Ref Range Status   11/12/2018 3.9 1.0 - 5.0 % Final     Basophil Rel %   Date Value Ref Range Status   07/23/2020 0.6 0.0 - 1.5 % Final     Basophil %   Date Value Ref Range Status   11/12/2018 0.7 0.0 - 1.1 % Final     Immature Grans %   Date Value Ref Range Status   11/12/2018 0.6 (H) 0.0 - 0.5 % Final     Neutrophils Absolute   Date Value Ref Range Status   07/23/2020 4.24 1.70 - 7.00 10*3/mm3 Final     Neutrophils, Absolute   Date Value Ref Range Status   02/17/2020 4.20 1.70 - 7.00 10*3/mm3 Final     Lymphocytes Absolute   Date Value Ref Range Status   07/23/2020 2.76 0.70 - 3.10 10*3/mm3 Final     Lymphocytes, Absolute   Date Value Ref Range Status   02/17/2020 2.70 0.70 - 3.10 10*3/mm3 Final     Monocytes Absolute   Date Value Ref Range Status   07/23/2020 1.10 (H) 0.10 - 0.90 10*3/mm3 Final     Monocytes, Absolute   Date Value Ref Range Status   02/17/2020 0.70 0.10 - 0.90 10*3/mm3 Final     Eosinophils Absolute   Date Value Ref Range Status   07/23/2020 0.09 0.00 - 0.40 10*3/mm3 Final     Eosinophils, Absolute   Date Value Ref Range Status    11/12/2018 0.33 0.00 - 0.36 10*3/mm3 Final     Basophils Absolute   Date Value Ref Range Status   07/23/2020 0.05 0.00 - 0.20 10*3/mm3 Final     Basophils, Absolute   Date Value Ref Range Status   11/12/2018 0.06 0.00 - 0.10 10*3/mm3 Final     Immature Grans, Absolute   Date Value Ref Range Status   11/12/2018 0.05 (H) 0.00 - 0.03 10*3/mm3 Final     nRBC   Date Value Ref Range Status   07/23/2020 0.0 0.0 - 0.2 /100 WBC Final   11/12/2018 0.0 0.0 - 0.0 /100 WBC Final       Lab Results   Component Value Date    GLUCOSE 137 (H) 07/08/2019    BUN 13 07/23/2020    CREATININE 0.94 07/23/2020    EGFRIFNONA 78 07/23/2020    EGFRIFAFRI 94 07/23/2020    BCR 13.8 07/23/2020    CO2 26.3 07/23/2020    CALCIUM 9.6 07/23/2020    PROTENTOTREF 8.2 07/23/2020    ALBUMIN 4.00 07/23/2020    LABIL2 1.0 07/23/2020    AST 19 07/23/2020    ALT 11 07/23/2020         Imaging Results (Last 7 Days)     ** No results found for the last 168 hours. **            Assessment/Plan    1.  Left-sided colitis: With rectal bleeding, mucousy stool.  He has been on Apriso.  He does appear to have gotten good response following budesonide.  He does not like the enemas that he took though they seemed to help quite a bit    2. Rectal bleeding: improved, recent normal Hb    3. Fecal urgency: improved    Plan  I discussed with him today why he is upset.  It seems that he is more upset that he has had on and off symptoms for over a year.  I discussed the natural history of ulcerative colitis and waxing and waning symptoms.  I also discussed concerns using a biologic agent at both his age and during the pandemic.  However, this may be the route we need to go eventually.  I am going to see how he does off of the budesonide here for a little while.  I told him to call the office should the bloody and mucousy stools return and we can do another course of budesonide.  I explained to him that budesonide is not a long-term medication.  If we did need to go to a  biologic, I think Entyvio would be the better option for him if he is agreeable to an infusion.    Have him follow-up in about 6 weeks.  He can do that with Geri    Advised not to use the prednisone, especially during the pandemic.    Jayden was seen today for colitis.    Diagnoses and all orders for this visit:    Rectal bleeding    Left sided colitis with rectal bleeding (CMS/HCC)    Fecal urgency    Other orders  -     mesalamine (Apriso) 0.375 g 24 hr capsule; Take 4 capsules by mouth Daily.        Dictated utilizing Dragon dictation

## 2020-09-01 RX ORDER — ALPRAZOLAM 0.25 MG/1
TABLET ORAL
Qty: 30 TABLET | Refills: 0 | Status: SHIPPED | OUTPATIENT
Start: 2020-09-01 | End: 2020-09-11

## 2020-09-11 RX ORDER — ALPRAZOLAM 0.25 MG/1
TABLET ORAL
Qty: 30 TABLET | Refills: 0 | Status: SHIPPED | OUTPATIENT
Start: 2020-09-11 | End: 2020-10-01

## 2020-09-14 ENCOUNTER — OFFICE VISIT (OUTPATIENT)
Dept: FAMILY MEDICINE CLINIC | Facility: CLINIC | Age: 80
End: 2020-09-14

## 2020-09-14 VITALS
DIASTOLIC BLOOD PRESSURE: 78 MMHG | HEART RATE: 90 BPM | BODY MASS INDEX: 21.64 KG/M2 | SYSTOLIC BLOOD PRESSURE: 120 MMHG | WEIGHT: 151.2 LBS | TEMPERATURE: 97.7 F | HEIGHT: 70 IN | OXYGEN SATURATION: 97 %

## 2020-09-14 DIAGNOSIS — K51.511 LEFT SIDED COLITIS WITH RECTAL BLEEDING (HCC): Primary | ICD-10-CM

## 2020-09-14 DIAGNOSIS — R19.4 CHANGE IN BOWEL HABITS: ICD-10-CM

## 2020-09-14 PROCEDURE — 99213 OFFICE O/P EST LOW 20 MIN: CPT | Performed by: NURSE PRACTITIONER

## 2020-09-14 RX ORDER — FLUOXETINE HYDROCHLORIDE 20 MG/1
20 CAPSULE ORAL DAILY
Qty: 90 CAPSULE | Refills: 0 | Status: SHIPPED | OUTPATIENT
Start: 2020-09-14 | End: 2020-11-30

## 2020-09-14 NOTE — PROGRESS NOTES
Subjective   Jayden Marquez is a 80 y.o. male.     History of Present Illness   Here today for f/u, c/o abnormal stool. He sees GI Dr. Loza, with UC, has intermittent rectal bleeding and mucous in stool. Last visit with GI 8/25/2020 and he has f/u in 10/2020, given budesonide, which helped, did not recommend prednisone during current covid 19 pandemic, also reluctant to begin biologic during pandemic however this may be their next step for symptoms. He states he still has symptoms, but they have improved. He is currently taking apiso which helps.     The following portions of the patient's history were reviewed and updated as appropriate: allergies and current medications.    Review of Systems   Constitutional: Negative for chills, diaphoresis and fever.   Respiratory: Negative for cough and shortness of breath.    Cardiovascular: Negative for chest pain.   Gastrointestinal: Positive for blood in stool. Negative for abdominal pain and diarrhea.   Musculoskeletal: Negative for arthralgias and myalgias.   Neurological: Negative for dizziness, light-headedness and headache.   All other systems reviewed and are negative.      Objective   Physical Exam  Vitals signs and nursing note reviewed.   Constitutional:       Appearance: He is well-developed.   HENT:      Head: Normocephalic.   Eyes:      Pupils: Pupils are equal, round, and reactive to light.   Cardiovascular:      Rate and Rhythm: Normal rate and regular rhythm.      Heart sounds: Normal heart sounds.   Pulmonary:      Effort: Pulmonary effort is normal.      Breath sounds: Normal breath sounds.   Abdominal:      General: Bowel sounds are normal.      Palpations: Abdomen is soft.   Skin:     General: Skin is warm and dry.   Neurological:      Mental Status: He is alert and oriented to person, place, and time.   Psychiatric:         Behavior: Behavior normal.         Judgment: Judgment normal.           Assessment/Plan   Jayden was seen today for abnormal  stool.    Diagnoses and all orders for this visit:    Left sided colitis with rectal bleeding (CMS/HCC)    Change in bowel habits        He will cont f/u with GI as scheduled.   Reviewed med recommendations per GI.   He will call GI to inform blood and mucous in stool again for another possible round of budesonide   Increase fluid intake, get plenty of rest.   Patient agrees with plan of care and understands instructions. Call if worsening symptoms or any problems or concerns.

## 2020-09-15 NOTE — PATIENT INSTRUCTIONS
He will cont f/u with GI as scheduled.   Reviewed med recommendations per GI.   He will call GI to inform blood and mucous in stool again for another possible round of budesonide   Increase fluid intake, get plenty of rest.   Patient agrees with plan of care and understands instructions. Call if worsening symptoms or any problems or concerns.

## 2020-09-24 ENCOUNTER — OFFICE VISIT (OUTPATIENT)
Dept: GASTROENTEROLOGY | Facility: CLINIC | Age: 80
End: 2020-09-24

## 2020-09-24 VITALS — WEIGHT: 151.6 LBS | HEIGHT: 71 IN | BODY MASS INDEX: 21.22 KG/M2

## 2020-09-24 DIAGNOSIS — R15.9 INCONTINENCE OF FECES WITH FECAL URGENCY: ICD-10-CM

## 2020-09-24 DIAGNOSIS — K51.80 OTHER ULCERATIVE COLITIS WITHOUT COMPLICATION (HCC): ICD-10-CM

## 2020-09-24 DIAGNOSIS — R15.2 INCONTINENCE OF FECES WITH FECAL URGENCY: ICD-10-CM

## 2020-09-24 DIAGNOSIS — K51.50 LEFT SIDED COLITIS WITHOUT COMPLICATIONS (HCC): Primary | ICD-10-CM

## 2020-09-24 DIAGNOSIS — R19.7 DIARRHEA, UNSPECIFIED TYPE: ICD-10-CM

## 2020-09-24 PROCEDURE — 99214 OFFICE O/P EST MOD 30 MIN: CPT | Performed by: NURSE PRACTITIONER

## 2020-09-24 RX ORDER — MESALAMINE 0.38 G/1
1500 CAPSULE, EXTENDED RELEASE ORAL DAILY
Qty: 120 CAPSULE | Refills: 11 | Status: SHIPPED | OUTPATIENT
Start: 2020-09-24 | End: 2020-10-23 | Stop reason: SDUPTHER
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
## 2020-10-01 RX ORDER — ALPRAZOLAM 0.25 MG/1
TABLET ORAL
Qty: 30 TABLET | Refills: 0 | Status: SHIPPED | OUTPATIENT
Start: 2020-10-01 | End: 2020-10-15

## 2020-10-06 ENCOUNTER — TELEPHONE (OUTPATIENT)
Dept: GASTROENTEROLOGY | Facility: CLINIC | Age: 80
End: 2020-10-06

## 2020-10-06 NOTE — TELEPHONE ENCOUNTER
----- Message from Kim Romero sent at 10/6/2020  3:02 PM EDT -----  Contact: [phone]  Patient is requesting call back from RN

## 2020-10-06 NOTE — TELEPHONE ENCOUNTER
"Call to pt.  Reports for past 4-5 days having increasing diarrhea -\"just slime\".  Noting blood on tissue.  Up 6-7x last night.  Same frequency today.  Some abd cramping.  Denies fever.  Eating and drinking without problem.     Is taking apriso 4x/day- advise to take 4 caps daily.  Verb understanding.    States does not want to \"fool around with those enemas\".      Update to GUNNAR Uribe.   "

## 2020-10-07 NOTE — TELEPHONE ENCOUNTER
Call to pt.  Advise per M Rony questions.  States is on budesonide 3 mg - 2 tabs po daily.  Has also started imodium without significant relief.     Update to M Rony.

## 2020-10-07 NOTE — TELEPHONE ENCOUNTER
Okay have him go back up on the budesonide to 3-3 mg tablets a day.  Continue Imodium.  Have him call us back Friday morning with an update.

## 2020-10-07 NOTE — TELEPHONE ENCOUNTER
Okay he needs to stay on the apriso 4 tabs daily.  Is he still on budesonide 6 mg daily?  Did he ever start any Imodium?

## 2020-10-09 ENCOUNTER — TELEPHONE (OUTPATIENT)
Dept: GASTROENTEROLOGY | Facility: CLINIC | Age: 80
End: 2020-10-09

## 2020-10-09 NOTE — TELEPHONE ENCOUNTER
I would have him take 3 budesonide tablets so 9 mg daily with the other medications and see if that helps to slow things down.  If that does not work he might want to go to the ER over the weekend and get checked out.  Something else might be going on.

## 2020-10-09 NOTE — TELEPHONE ENCOUNTER
Called pt and pt reports that he is not doing well. Pt states he is still seeing blood in stool and is still having 5-6 bms a day. He stools are slimey and nothing solid. He is still cramping.     Pt is taking 2 tabs of budesonide and mesalmine 4 tabs. Pt also taking imodium .  Pt denies a fever and chills. Advised will update [facility] SMITH.

## 2020-10-09 NOTE — TELEPHONE ENCOUNTER
----- Message from Sloane Damon sent at 10/9/2020  1:12 PM EDT -----  Regarding: PT said he supposed call [facility] today  PT called, said he was supposed call [facility] today. [phone]

## 2020-10-15 RX ORDER — ALPRAZOLAM 0.25 MG/1
TABLET ORAL
Qty: 30 TABLET | Refills: 0 | Status: SHIPPED | OUTPATIENT
Start: 2020-10-15 | End: 2020-11-03

## 2020-10-23 ENCOUNTER — OFFICE VISIT (OUTPATIENT)
Dept: GASTROENTEROLOGY | Facility: CLINIC | Age: 80
End: 2020-10-23

## 2020-10-23 VITALS — WEIGHT: 149.4 LBS | HEIGHT: 70 IN | BODY MASS INDEX: 21.39 KG/M2 | TEMPERATURE: 98 F

## 2020-10-23 DIAGNOSIS — R15.2 FECAL URGENCY: ICD-10-CM

## 2020-10-23 DIAGNOSIS — K51.511 LEFT SIDED COLITIS WITH RECTAL BLEEDING (HCC): Primary | ICD-10-CM

## 2020-10-23 DIAGNOSIS — R19.7 DIARRHEA, UNSPECIFIED TYPE: ICD-10-CM

## 2020-10-23 PROCEDURE — 99214 OFFICE O/P EST MOD 30 MIN: CPT | Performed by: NURSE PRACTITIONER

## 2020-10-23 RX ORDER — BUDESONIDE 3 MG/1
CAPSULE, COATED PELLETS ORAL
COMMUNITY
Start: 2020-10-09 | End: 2020-10-23 | Stop reason: SDUPTHER

## 2020-10-23 RX ORDER — BUDESONIDE 3 MG/1
9 CAPSULE, COATED PELLETS ORAL EVERY MORNING
Qty: 90 CAPSULE | Refills: 3 | Status: SHIPPED | OUTPATIENT
Start: 2020-10-23 | End: 2022-07-01

## 2020-10-23 RX ORDER — MESALAMINE 0.38 G/1
1500 CAPSULE, EXTENDED RELEASE ORAL DAILY
Qty: 120 CAPSULE | Refills: 11 | Status: SHIPPED | OUTPATIENT
Start: 2020-10-23 | End: 2021-09-17

## 2020-10-23 RX ORDER — LOPERAMIDE HYDROCHLORIDE 2 MG/1
2 TABLET ORAL DAILY
COMMUNITY
End: 2020-11-23
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
## 2020-11-03 RX ORDER — ALPRAZOLAM 0.25 MG/1
TABLET ORAL
Qty: 30 TABLET | Refills: 0 | Status: SHIPPED | OUTPATIENT
Start: 2020-11-03 | End: 2020-11-18

## 2020-11-04 RX ORDER — PROPRANOLOL HYDROCHLORIDE 20 MG/1
TABLET ORAL
Qty: 180 TABLET | Refills: 1 | Status: SHIPPED | OUTPATIENT
Start: 2020-11-04 | End: 2021-05-18

## 2020-11-18 RX ORDER — ALPRAZOLAM 0.25 MG/1
TABLET ORAL
Qty: 30 TABLET | Refills: 0 | Status: SHIPPED | OUTPATIENT
Start: 2020-11-18 | End: 2020-11-30

## 2020-11-23 ENCOUNTER — OFFICE VISIT (OUTPATIENT)
Dept: GASTROENTEROLOGY | Facility: CLINIC | Age: 80
End: 2020-11-23

## 2020-11-23 VITALS — BODY MASS INDEX: 20.93 KG/M2 | HEIGHT: 70 IN | TEMPERATURE: 96.8 F | WEIGHT: 146.2 LBS

## 2020-11-23 DIAGNOSIS — K51.511 LEFT SIDED COLITIS WITH RECTAL BLEEDING (HCC): Primary | ICD-10-CM

## 2020-11-23 DIAGNOSIS — R15.2 FECAL URGENCY: ICD-10-CM

## 2020-11-23 DIAGNOSIS — K59.00 CONSTIPATION, UNSPECIFIED CONSTIPATION TYPE: ICD-10-CM

## 2020-11-23 PROCEDURE — 99214 OFFICE O/P EST MOD 30 MIN: CPT | Performed by: NURSE PRACTITIONER
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
## 2020-11-24 LAB
ALBUMIN SERPL-MCNC: NORMAL G/DL
ALP SERPL-CCNC: NORMAL U/L
ALT SERPL-CCNC: NORMAL U/L
AST SERPL-CCNC: NORMAL U/L
BASOPHILS # BLD AUTO: 0.04 10*3/MM3 (ref 0–0.2)
BASOPHILS NFR BLD AUTO: 0.4 % (ref 0–1.5)
BILIRUB SERPL-MCNC: NORMAL MG/DL
BUN SERPL-MCNC: NORMAL MG/DL
CALCIUM SERPL-MCNC: NORMAL MG/DL
CHLORIDE SERPL-SCNC: NORMAL MMOL/L
CO2 SERPL-SCNC: NORMAL MMOL/L
CREAT SERPL-MCNC: NORMAL MG/DL
EOSINOPHIL # BLD AUTO: 0.06 10*3/MM3 (ref 0–0.4)
EOSINOPHIL NFR BLD AUTO: 0.6 % (ref 0.3–6.2)
ERYTHROCYTE [DISTWIDTH] IN BLOOD BY AUTOMATED COUNT: 13.6 % (ref 12.3–15.4)
GLUCOSE SERPL-MCNC: NORMAL MG/DL
HCT VFR BLD AUTO: 44.9 % (ref 37.5–51)
HGB BLD-MCNC: 14.6 G/DL (ref 13–17.7)
IMM GRANULOCYTES # BLD AUTO: 0.06 10*3/MM3 (ref 0–0.05)
IMM GRANULOCYTES NFR BLD AUTO: 0.6 % (ref 0–0.5)
LYMPHOCYTES # BLD AUTO: 2.6 10*3/MM3 (ref 0.7–3.1)
LYMPHOCYTES NFR BLD AUTO: 25 % (ref 19.6–45.3)
MCH RBC QN AUTO: 29.3 PG (ref 26.6–33)
MCHC RBC AUTO-ENTMCNC: 32.5 G/DL (ref 31.5–35.7)
MCV RBC AUTO: 90.2 FL (ref 79–97)
MONOCYTES # BLD AUTO: 1.14 10*3/MM3 (ref 0.1–0.9)
MONOCYTES NFR BLD AUTO: 11 % (ref 5–12)
NEUTROPHILS # BLD AUTO: 6.5 10*3/MM3 (ref 1.7–7)
NEUTROPHILS NFR BLD AUTO: 62.4 % (ref 42.7–76)
NRBC BLD AUTO-RTO: 0 /100 WBC (ref 0–0.2)
PLATELET # BLD AUTO: 312 10*3/MM3 (ref 140–450)
POTASSIUM SERPL-SCNC: NORMAL MMOL/L
PROT SERPL-MCNC: NORMAL G/DL
RBC # BLD AUTO: 4.98 10*6/MM3 (ref 4.14–5.8)
SODIUM SERPL-SCNC: NORMAL MMOL/L
WBC # BLD AUTO: 10.4 10*3/MM3 (ref 3.4–10.8)

## 2020-11-30 ENCOUNTER — OFFICE VISIT (OUTPATIENT)
Dept: FAMILY MEDICINE CLINIC | Facility: CLINIC | Age: 80
End: 2020-11-30

## 2020-11-30 VITALS
OXYGEN SATURATION: 96 % | WEIGHT: 149 LBS | HEIGHT: 70 IN | SYSTOLIC BLOOD PRESSURE: 128 MMHG | HEART RATE: 86 BPM | BODY MASS INDEX: 21.33 KG/M2 | TEMPERATURE: 97.1 F | DIASTOLIC BLOOD PRESSURE: 72 MMHG

## 2020-11-30 DIAGNOSIS — R53.1 WEAKNESS: ICD-10-CM

## 2020-11-30 DIAGNOSIS — K52.9 COLITIS: ICD-10-CM

## 2020-11-30 DIAGNOSIS — R53.83 FATIGUE, UNSPECIFIED TYPE: Primary | ICD-10-CM

## 2020-11-30 LAB
ALBUMIN SERPL-MCNC: 4 G/DL (ref 3.5–5.2)
ALBUMIN/GLOB SERPL: 0.8 G/DL
ALP SERPL-CCNC: 126 U/L (ref 39–117)
ALT SERPL W P-5'-P-CCNC: 14 U/L (ref 1–41)
ANION GAP SERPL CALCULATED.3IONS-SCNC: 11.4 MMOL/L (ref 5–15)
AST SERPL-CCNC: 17 U/L (ref 1–40)
BILIRUB SERPL-MCNC: 0.5 MG/DL (ref 0–1.2)
BUN SERPL-MCNC: 11 MG/DL (ref 8–23)
BUN/CREAT SERPL: 12.6 (ref 7–25)
CALCIUM SPEC-SCNC: 10 MG/DL (ref 8.6–10.5)
CHLORIDE SERPL-SCNC: 98 MMOL/L (ref 98–107)
CO2 SERPL-SCNC: 26.6 MMOL/L (ref 22–29)
CREAT SERPL-MCNC: 0.87 MG/DL (ref 0.76–1.27)
ERYTHROCYTE [DISTWIDTH] IN BLOOD BY AUTOMATED COUNT: 14.2 % (ref 12.3–15.4)
GFR SERPL CREATININE-BSD FRML MDRD: 85 ML/MIN/1.73
GLOBULIN UR ELPH-MCNC: 5.1 GM/DL
GLUCOSE SERPL-MCNC: 98 MG/DL (ref 65–99)
HCT VFR BLD AUTO: 42.4 % (ref 37.5–51)
HGB BLD-MCNC: 14.3 G/DL (ref 13–17.7)
LYMPHOCYTES # BLD AUTO: 3.4 10*3/MM3 (ref 0.7–3.1)
LYMPHOCYTES NFR BLD AUTO: 30.8 % (ref 19.6–45.3)
MCH RBC QN AUTO: 30.2 PG (ref 26.6–33)
MCHC RBC AUTO-ENTMCNC: 33.7 G/DL (ref 31.5–35.7)
MCV RBC AUTO: 89.5 FL (ref 79–97)
MONOCYTES # BLD AUTO: 0.8 10*3/MM3 (ref 0.1–0.9)
MONOCYTES NFR BLD AUTO: 7.6 % (ref 5–12)
NEUTROPHILS NFR BLD AUTO: 6.8 10*3/MM3 (ref 1.7–7)
NEUTROPHILS NFR BLD AUTO: 61.6 % (ref 42.7–76)
PLATELET # BLD AUTO: 364 10*3/MM3 (ref 140–450)
PMV BLD AUTO: 6.6 FL (ref 6–12)
POTASSIUM SERPL-SCNC: 4.9 MMOL/L (ref 3.5–5.2)
PROT SERPL-MCNC: 9.1 G/DL (ref 6–8.5)
RBC # BLD AUTO: 4.74 10*6/MM3 (ref 4.14–5.8)
SODIUM SERPL-SCNC: 136 MMOL/L (ref 136–145)
TSH SERPL DL<=0.05 MIU/L-ACNC: 2.47 UIU/ML (ref 0.27–4.2)
WBC # BLD AUTO: 11 10*3/MM3 (ref 3.4–10.8)

## 2020-11-30 PROCEDURE — 85025 COMPLETE CBC W/AUTO DIFF WBC: CPT | Performed by: INTERNAL MEDICINE

## 2020-11-30 PROCEDURE — 84443 ASSAY THYROID STIM HORMONE: CPT | Performed by: INTERNAL MEDICINE

## 2020-11-30 PROCEDURE — 80053 COMPREHEN METABOLIC PANEL: CPT | Performed by: INTERNAL MEDICINE

## 2020-11-30 PROCEDURE — 99214 OFFICE O/P EST MOD 30 MIN: CPT | Performed by: INTERNAL MEDICINE

## 2020-11-30 PROCEDURE — 36415 COLL VENOUS BLD VENIPUNCTURE: CPT | Performed by: INTERNAL MEDICINE

## 2020-11-30 RX ORDER — FLUOXETINE HYDROCHLORIDE 20 MG/1
20 CAPSULE ORAL DAILY
Qty: 90 CAPSULE | Refills: 0 | Status: SHIPPED | OUTPATIENT
Start: 2020-11-30 | End: 2021-01-28

## 2020-11-30 RX ORDER — ALPRAZOLAM 0.25 MG/1
TABLET ORAL
Qty: 30 TABLET | Refills: 0 | Status: SHIPPED | OUTPATIENT
Start: 2020-11-30 | End: 2020-12-14

## 2020-12-02 DIAGNOSIS — R74.8 ELEVATED ALKALINE PHOSPHATASE LEVEL: ICD-10-CM

## 2020-12-02 DIAGNOSIS — R77.9 ELEVATED SERUM PROTEIN LEVEL: Primary | ICD-10-CM

## 2020-12-04 ENCOUNTER — LAB (OUTPATIENT)
Dept: FAMILY MEDICINE CLINIC | Facility: CLINIC | Age: 80
End: 2020-12-04

## 2020-12-04 DIAGNOSIS — R77.9 ELEVATED SERUM PROTEIN LEVEL: ICD-10-CM

## 2020-12-04 DIAGNOSIS — R74.8 ELEVATED ALKALINE PHOSPHATASE LEVEL: ICD-10-CM

## 2020-12-04 PROCEDURE — 36415 COLL VENOUS BLD VENIPUNCTURE: CPT | Performed by: INTERNAL MEDICINE

## 2020-12-07 ENCOUNTER — OFFICE VISIT (OUTPATIENT)
Dept: GASTROENTEROLOGY | Facility: CLINIC | Age: 80
End: 2020-12-07

## 2020-12-07 VITALS — WEIGHT: 148.2 LBS | HEIGHT: 71 IN | BODY MASS INDEX: 20.75 KG/M2 | TEMPERATURE: 97 F

## 2020-12-07 DIAGNOSIS — K51.511 LEFT SIDED COLITIS WITH RECTAL BLEEDING (HCC): ICD-10-CM

## 2020-12-07 DIAGNOSIS — R15.2 FECAL URGENCY: ICD-10-CM

## 2020-12-07 DIAGNOSIS — R19.4 CHANGE IN BOWEL HABITS: Primary | ICD-10-CM

## 2020-12-07 LAB
ALBUMIN SERPL ELPH-MCNC: 3 G/DL (ref 2.9–4.4)
ALBUMIN/GLOB SERPL: 0.6 {RATIO} (ref 0.7–1.7)
ALP BONE CFR SERPL: 22 % (ref 12–68)
ALP INTEST CFR SERPL: 2 % (ref 0–18)
ALP LIVER CFR SERPL: 76 % (ref 13–88)
ALP SERPL-CCNC: 148 IU/L (ref 39–117)
ALPHA1 GLOB SERPL ELPH-MCNC: 0.4 G/DL (ref 0–0.4)
ALPHA2 GLOB SERPL ELPH-MCNC: 1.2 G/DL (ref 0.4–1)
B-GLOBULIN SERPL ELPH-MCNC: 2 G/DL (ref 0.7–1.3)
GAMMA GLOB SERPL ELPH-MCNC: 1.8 G/DL (ref 0.4–1.8)
GLOBULIN SER CALC-MCNC: 5.4 G/DL (ref 2.2–3.9)
LABORATORY COMMENT REPORT: ABNORMAL
M PROTEIN SERPL ELPH-MCNC: 1.1 G/DL
PROT PATTERN SERPL ELPH-IMP: ABNORMAL
PROT SERPL-MCNC: 8.4 G/DL (ref 6–8.5)

## 2020-12-07 PROCEDURE — 99214 OFFICE O/P EST MOD 30 MIN: CPT | Performed by: NURSE PRACTITIONER
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
## 2020-12-09 DIAGNOSIS — R77.8 ABNORMAL SERUM PROTEIN TEST: Primary | ICD-10-CM

## 2020-12-10 ENCOUNTER — LAB (OUTPATIENT)
Dept: FAMILY MEDICINE CLINIC | Facility: CLINIC | Age: 80
End: 2020-12-10

## 2020-12-10 DIAGNOSIS — R77.8 ABNORMAL SERUM PROTEIN TEST: ICD-10-CM

## 2020-12-10 PROCEDURE — 36415 COLL VENOUS BLD VENIPUNCTURE: CPT | Performed by: INTERNAL MEDICINE

## 2020-12-14 DIAGNOSIS — R77.8 ABNORMAL SERUM PROTEIN TEST: Primary | ICD-10-CM

## 2020-12-14 LAB
IGA SERPL-MCNC: 987 MG/DL (ref 61–437)
IGG SERPL-MCNC: 1724 MG/DL (ref 603–1613)
IGM SERPL-MCNC: 142 MG/DL (ref 15–143)
INTERPRETATION UR IFE-IMP: ABNORMAL
PROT PATTERN SERPL IFE-IMP: ABNORMAL

## 2020-12-14 RX ORDER — ALPRAZOLAM 0.25 MG/1
TABLET ORAL
Qty: 30 TABLET | Refills: 0 | Status: SHIPPED | OUTPATIENT
Start: 2020-12-14 | End: 2020-12-29

## 2020-12-29 RX ORDER — ALPRAZOLAM 0.25 MG/1
TABLET ORAL
Qty: 30 TABLET | Refills: 0 | Status: SHIPPED | OUTPATIENT
Start: 2020-12-29 | End: 2021-01-15

## 2021-01-04 ENCOUNTER — LAB (OUTPATIENT)
Dept: LAB | Facility: HOSPITAL | Age: 81
End: 2021-01-04

## 2021-01-04 ENCOUNTER — CONSULT (OUTPATIENT)
Dept: ONCOLOGY | Facility: CLINIC | Age: 81
End: 2021-01-04

## 2021-01-04 VITALS
DIASTOLIC BLOOD PRESSURE: 90 MMHG | RESPIRATION RATE: 18 BRPM | HEIGHT: 71 IN | SYSTOLIC BLOOD PRESSURE: 146 MMHG | TEMPERATURE: 97.5 F | HEART RATE: 68 BPM | BODY MASS INDEX: 20.69 KG/M2 | WEIGHT: 147.8 LBS | OXYGEN SATURATION: 93 %

## 2021-01-04 DIAGNOSIS — K51.511 LEFT SIDED COLITIS WITH RECTAL BLEEDING (HCC): Primary | ICD-10-CM

## 2021-01-04 DIAGNOSIS — D47.2 MGUS (MONOCLONAL GAMMOPATHY OF UNKNOWN SIGNIFICANCE): ICD-10-CM

## 2021-01-04 DIAGNOSIS — Z09 ENCOUNTER FOR HEMATOLOGY FOLLOW-UP: Primary | ICD-10-CM

## 2021-01-04 LAB
ALBUMIN SERPL-MCNC: 3.9 G/DL (ref 3.5–5.2)
ALBUMIN/GLOB SERPL: 0.8 G/DL (ref 1.1–2.4)
ALP SERPL-CCNC: 132 U/L (ref 38–116)
ALT SERPL W P-5'-P-CCNC: 7 U/L (ref 0–41)
ANION GAP SERPL CALCULATED.3IONS-SCNC: 13.9 MMOL/L (ref 5–15)
AST SERPL-CCNC: 14 U/L (ref 0–40)
B2 MICROGLOB SERPL-MCNC: 2.8 MG/L (ref 0.8–2.2)
BASOPHILS # BLD AUTO: 0.06 10*3/MM3 (ref 0–0.2)
BASOPHILS NFR BLD AUTO: 0.6 % (ref 0–1.5)
BILIRUB SERPL-MCNC: 0.4 MG/DL (ref 0.2–1.2)
BUN SERPL-MCNC: 13 MG/DL (ref 6–20)
BUN/CREAT SERPL: 14.3 (ref 7.3–30)
CALCIUM SPEC-SCNC: 9.9 MG/DL (ref 8.5–10.2)
CHLORIDE SERPL-SCNC: 101 MMOL/L (ref 98–107)
CO2 SERPL-SCNC: 24.1 MMOL/L (ref 22–29)
CREAT SERPL-MCNC: 0.91 MG/DL (ref 0.7–1.3)
CRP SERPL-MCNC: 1.95 MG/DL (ref 0–0.5)
DEPRECATED RDW RBC AUTO: 44.1 FL (ref 37–54)
EOSINOPHIL # BLD AUTO: 0.12 10*3/MM3 (ref 0–0.4)
EOSINOPHIL NFR BLD AUTO: 1.3 % (ref 0.3–6.2)
ERYTHROCYTE [DISTWIDTH] IN BLOOD BY AUTOMATED COUNT: 13.5 % (ref 12.3–15.4)
ERYTHROCYTE [SEDIMENTATION RATE] IN BLOOD: 49 MM/HR (ref 0–20)
GFR SERPL CREATININE-BSD FRML MDRD: 81 ML/MIN/1.73
GLOBULIN UR ELPH-MCNC: 5.2 GM/DL (ref 1.8–3.5)
GLUCOSE SERPL-MCNC: 98 MG/DL (ref 74–124)
HCT VFR BLD AUTO: 40.1 % (ref 37.5–51)
HGB BLD-MCNC: 13.4 G/DL (ref 13–17.7)
IMM GRANULOCYTES # BLD AUTO: 0.08 10*3/MM3 (ref 0–0.05)
IMM GRANULOCYTES NFR BLD AUTO: 0.9 % (ref 0–0.5)
LDH SERPL-CCNC: 153 U/L (ref 99–259)
LYMPHOCYTES # BLD AUTO: 2.72 10*3/MM3 (ref 0.7–3.1)
LYMPHOCYTES NFR BLD AUTO: 29 % (ref 19.6–45.3)
MCH RBC QN AUTO: 29.7 PG (ref 26.6–33)
MCHC RBC AUTO-ENTMCNC: 33.4 G/DL (ref 31.5–35.7)
MCV RBC AUTO: 88.9 FL (ref 79–97)
MONOCYTES # BLD AUTO: 1.26 10*3/MM3 (ref 0.1–0.9)
MONOCYTES NFR BLD AUTO: 13.4 % (ref 5–12)
NEUTROPHILS NFR BLD AUTO: 5.13 10*3/MM3 (ref 1.7–7)
NEUTROPHILS NFR BLD AUTO: 54.8 % (ref 42.7–76)
NRBC BLD AUTO-RTO: 0 /100 WBC (ref 0–0.2)
PLATELET # BLD AUTO: 298 10*3/MM3 (ref 140–450)
PMV BLD AUTO: 9 FL (ref 6–12)
POTASSIUM SERPL-SCNC: 4.4 MMOL/L (ref 3.5–4.7)
PROT SERPL-MCNC: 9.1 G/DL (ref 6.3–8)
RBC # BLD AUTO: 4.51 10*6/MM3 (ref 4.14–5.8)
SODIUM SERPL-SCNC: 139 MMOL/L (ref 134–145)
WBC # BLD AUTO: 9.37 10*3/MM3 (ref 3.4–10.8)

## 2021-01-04 PROCEDURE — 82232 ASSAY OF BETA-2 PROTEIN: CPT | Performed by: INTERNAL MEDICINE

## 2021-01-04 PROCEDURE — 85652 RBC SED RATE AUTOMATED: CPT | Performed by: INTERNAL MEDICINE

## 2021-01-04 PROCEDURE — 86140 C-REACTIVE PROTEIN: CPT | Performed by: INTERNAL MEDICINE

## 2021-01-04 PROCEDURE — 85025 COMPLETE CBC W/AUTO DIFF WBC: CPT

## 2021-01-04 PROCEDURE — 36415 COLL VENOUS BLD VENIPUNCTURE: CPT

## 2021-01-04 PROCEDURE — 83615 LACTATE (LD) (LDH) ENZYME: CPT | Performed by: INTERNAL MEDICINE

## 2021-01-04 PROCEDURE — 80053 COMPREHEN METABOLIC PANEL: CPT | Performed by: INTERNAL MEDICINE

## 2021-01-04 PROCEDURE — 99204 OFFICE O/P NEW MOD 45 MIN: CPT | Performed by: INTERNAL MEDICINE

## 2021-01-05 LAB
ALBUMIN SERPL ELPH-MCNC: 3.5 G/DL (ref 2.9–4.4)
ALBUMIN/GLOB SERPL: 0.7 {RATIO} (ref 0.7–1.7)
ALPHA1 GLOB SERPL ELPH-MCNC: 0.4 G/DL (ref 0–0.4)
ALPHA2 GLOB SERPL ELPH-MCNC: 1.1 G/DL (ref 0.4–1)
B-GLOBULIN SERPL ELPH-MCNC: 1.8 G/DL (ref 0.7–1.3)
GAMMA GLOB SERPL ELPH-MCNC: 1.9 G/DL (ref 0.4–1.8)
GLOBULIN SER-MCNC: 5.2 G/DL (ref 2.2–3.9)
IGA SERPL-MCNC: 982 MG/DL (ref 61–437)
IGG SERPL-MCNC: 1916 MG/DL (ref 603–1613)
IGM SERPL-MCNC: 133 MG/DL (ref 15–143)
INTERPRETATION SERPL IEP-IMP: ABNORMAL
KAPPA LC FREE SER-MCNC: 44.3 MG/L (ref 3.3–19.4)
KAPPA LC FREE/LAMBDA FREE SER: 1.08 {RATIO} (ref 0.26–1.65)
LABORATORY COMMENT REPORT: ABNORMAL
LAMBDA LC FREE SERPL-MCNC: 41.2 MG/L (ref 5.7–26.3)
M PROTEIN SERPL ELPH-MCNC: ABNORMAL G/DL
PROT SERPL-MCNC: 8.7 G/DL (ref 6–8.5)

## 2021-01-07 ENCOUNTER — OFFICE VISIT (OUTPATIENT)
Dept: GASTROENTEROLOGY | Facility: CLINIC | Age: 81
End: 2021-01-07

## 2021-01-07 VITALS — TEMPERATURE: 98.2 F | HEIGHT: 70 IN | WEIGHT: 150 LBS | BODY MASS INDEX: 21.47 KG/M2

## 2021-01-07 DIAGNOSIS — K51.911 ULCERATIVE COLITIS WITH RECTAL BLEEDING, UNSPECIFIED LOCATION (HCC): Primary | ICD-10-CM

## 2021-01-07 DIAGNOSIS — R19.4 CHANGE IN BOWEL HABITS: ICD-10-CM

## 2021-01-07 DIAGNOSIS — R15.2 FECAL URGENCY: ICD-10-CM

## 2021-01-07 PROCEDURE — 99214 OFFICE O/P EST MOD 30 MIN: CPT | Performed by: NURSE PRACTITIONER
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
## 2021-01-15 RX ORDER — ALPRAZOLAM 0.25 MG/1
TABLET ORAL
Qty: 30 TABLET | Refills: 3 | Status: SHIPPED | OUTPATIENT
Start: 2021-01-15 | End: 2021-03-16
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
## 2021-01-25 DIAGNOSIS — D47.2 MGUS (MONOCLONAL GAMMOPATHY OF UNKNOWN SIGNIFICANCE): Primary | ICD-10-CM

## 2021-01-28 ENCOUNTER — LAB (OUTPATIENT)
Dept: LAB | Facility: HOSPITAL | Age: 81
End: 2021-01-28

## 2021-01-28 ENCOUNTER — OFFICE VISIT (OUTPATIENT)
Dept: ONCOLOGY | Facility: CLINIC | Age: 81
End: 2021-01-28

## 2021-01-28 VITALS
WEIGHT: 150.5 LBS | RESPIRATION RATE: 18 BRPM | HEART RATE: 85 BPM | TEMPERATURE: 98.4 F | OXYGEN SATURATION: 96 % | HEIGHT: 71 IN | DIASTOLIC BLOOD PRESSURE: 81 MMHG | SYSTOLIC BLOOD PRESSURE: 144 MMHG | BODY MASS INDEX: 21.07 KG/M2

## 2021-01-28 DIAGNOSIS — D47.2 MGUS (MONOCLONAL GAMMOPATHY OF UNKNOWN SIGNIFICANCE): Primary | ICD-10-CM

## 2021-01-28 DIAGNOSIS — D47.2 MGUS (MONOCLONAL GAMMOPATHY OF UNKNOWN SIGNIFICANCE): ICD-10-CM

## 2021-01-28 LAB
BASOPHILS # BLD AUTO: 0.03 10*3/MM3 (ref 0–0.2)
BASOPHILS NFR BLD AUTO: 0.4 % (ref 0–1.5)
DEPRECATED RDW RBC AUTO: 46.3 FL (ref 37–54)
EOSINOPHIL # BLD AUTO: 0.08 10*3/MM3 (ref 0–0.4)
EOSINOPHIL NFR BLD AUTO: 1 % (ref 0.3–6.2)
ERYTHROCYTE [DISTWIDTH] IN BLOOD BY AUTOMATED COUNT: 13.6 % (ref 12.3–15.4)
HCT VFR BLD AUTO: 43 % (ref 37.5–51)
HGB BLD-MCNC: 14.4 G/DL (ref 13–17.7)
IMM GRANULOCYTES # BLD AUTO: 0.04 10*3/MM3 (ref 0–0.05)
IMM GRANULOCYTES NFR BLD AUTO: 0.5 % (ref 0–0.5)
LYMPHOCYTES # BLD AUTO: 2.54 10*3/MM3 (ref 0.7–3.1)
LYMPHOCYTES NFR BLD AUTO: 31.9 % (ref 19.6–45.3)
MCH RBC QN AUTO: 30.6 PG (ref 26.6–33)
MCHC RBC AUTO-ENTMCNC: 33.5 G/DL (ref 31.5–35.7)
MCV RBC AUTO: 91.3 FL (ref 79–97)
MONOCYTES # BLD AUTO: 1.24 10*3/MM3 (ref 0.1–0.9)
MONOCYTES NFR BLD AUTO: 15.6 % (ref 5–12)
NEUTROPHILS NFR BLD AUTO: 4.04 10*3/MM3 (ref 1.7–7)
NEUTROPHILS NFR BLD AUTO: 50.6 % (ref 42.7–76)
NRBC BLD AUTO-RTO: 0 /100 WBC (ref 0–0.2)
PLATELET # BLD AUTO: 219 10*3/MM3 (ref 140–450)
PMV BLD AUTO: 9.9 FL (ref 6–12)
RBC # BLD AUTO: 4.71 10*6/MM3 (ref 4.14–5.8)
WBC # BLD AUTO: 7.97 10*3/MM3 (ref 3.4–10.8)

## 2021-01-28 PROCEDURE — 99214 OFFICE O/P EST MOD 30 MIN: CPT | Performed by: INTERNAL MEDICINE

## 2021-01-28 PROCEDURE — 36415 COLL VENOUS BLD VENIPUNCTURE: CPT

## 2021-01-28 PROCEDURE — 85025 COMPLETE CBC W/AUTO DIFF WBC: CPT

## 2021-01-28 RX ORDER — FLUOXETINE HYDROCHLORIDE 20 MG/1
20 CAPSULE ORAL DAILY
Qty: 90 CAPSULE | Refills: 0 | Status: SHIPPED | OUTPATIENT
Start: 2021-01-28 | End: 2021-02-01

## 2021-02-01 RX ORDER — FLUOXETINE HYDROCHLORIDE 20 MG/1
20 CAPSULE ORAL DAILY
Qty: 90 CAPSULE | Refills: 0 | Status: SHIPPED | OUTPATIENT
Start: 2021-02-01 | End: 2021-05-18

## 2021-02-08 ENCOUNTER — OFFICE VISIT (OUTPATIENT)
Dept: GASTROENTEROLOGY | Facility: CLINIC | Age: 81
End: 2021-02-08

## 2021-02-08 VITALS — WEIGHT: 149.4 LBS | HEIGHT: 70 IN | TEMPERATURE: 97 F | BODY MASS INDEX: 21.39 KG/M2

## 2021-02-08 DIAGNOSIS — K51.511 LEFT SIDED COLITIS WITH RECTAL BLEEDING (HCC): Primary | ICD-10-CM

## 2021-02-08 DIAGNOSIS — R15.2 FECAL URGENCY: ICD-10-CM

## 2021-02-08 DIAGNOSIS — R19.7 DIARRHEA, UNSPECIFIED TYPE: ICD-10-CM

## 2021-02-08 PROCEDURE — 99214 OFFICE O/P EST MOD 30 MIN: CPT | Performed by: NURSE PRACTITIONER
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
## 2021-03-10 RX ORDER — ALPRAZOLAM 0.25 MG/1
TABLET ORAL
Qty: 30 TABLET | OUTPATIENT
Start: 2021-03-10

## 2021-03-10 NOTE — TELEPHONE ENCOUNTER
I have not seen, BTI patient with no upcoming appt, I am not on call. RLS refilled this in 1/2021 with refills.

## 2021-03-16 RX ORDER — ALPRAZOLAM 0.25 MG/1
TABLET ORAL
Qty: 30 TABLET | Refills: 0 | Status: SHIPPED | OUTPATIENT
Start: 2021-03-16 | End: 2021-03-29

## 2021-03-29 DIAGNOSIS — F41.1 GAD (GENERALIZED ANXIETY DISORDER): Primary | ICD-10-CM

## 2021-03-29 RX ORDER — ALPRAZOLAM 0.25 MG/1
TABLET ORAL
Qty: 30 TABLET | Refills: 0 | Status: SHIPPED | OUTPATIENT
Start: 2021-03-29 | End: 2021-04-19

## 2021-04-09 DIAGNOSIS — F41.1 GAD (GENERALIZED ANXIETY DISORDER): ICD-10-CM

## 2021-04-12 RX ORDER — ALPRAZOLAM 0.25 MG/1
TABLET ORAL
Qty: 30 TABLET | OUTPATIENT
Start: 2021-04-12

## 2021-04-19 DIAGNOSIS — F41.1 GAD (GENERALIZED ANXIETY DISORDER): ICD-10-CM

## 2021-04-19 RX ORDER — ALPRAZOLAM 0.25 MG/1
TABLET ORAL
Qty: 30 TABLET | Refills: 0 | Status: SHIPPED | OUTPATIENT
Start: 2021-04-19 | End: 2021-05-04

## 2021-04-27 ENCOUNTER — TELEPHONE (OUTPATIENT)
Dept: FAMILY MEDICINE CLINIC | Facility: CLINIC | Age: 81
End: 2021-04-27

## 2021-04-27 ENCOUNTER — OFFICE VISIT (OUTPATIENT)
Dept: FAMILY MEDICINE CLINIC | Facility: CLINIC | Age: 81
End: 2021-04-27

## 2021-04-27 VITALS
OXYGEN SATURATION: 98 % | WEIGHT: 149 LBS | HEART RATE: 64 BPM | TEMPERATURE: 97.7 F | SYSTOLIC BLOOD PRESSURE: 110 MMHG | BODY MASS INDEX: 21.33 KG/M2 | HEIGHT: 70 IN | DIASTOLIC BLOOD PRESSURE: 60 MMHG

## 2021-04-27 DIAGNOSIS — Z13.6 ENCOUNTER FOR LIPID SCREENING FOR CARDIOVASCULAR DISEASE: ICD-10-CM

## 2021-04-27 DIAGNOSIS — Z12.5 PROSTATE CANCER SCREENING: ICD-10-CM

## 2021-04-27 DIAGNOSIS — Z13.220 ENCOUNTER FOR LIPID SCREENING FOR CARDIOVASCULAR DISEASE: ICD-10-CM

## 2021-04-27 DIAGNOSIS — K51.511 LEFT SIDED COLITIS WITH RECTAL BLEEDING (HCC): ICD-10-CM

## 2021-04-27 DIAGNOSIS — Z00.00 MEDICARE ANNUAL WELLNESS VISIT, SUBSEQUENT: Primary | ICD-10-CM

## 2021-04-27 DIAGNOSIS — Z51.81 MEDICATION MONITORING ENCOUNTER: ICD-10-CM

## 2021-04-27 DIAGNOSIS — F41.1 GAD (GENERALIZED ANXIETY DISORDER): ICD-10-CM

## 2021-04-27 DIAGNOSIS — R25.1 TREMOR: ICD-10-CM

## 2021-04-27 DIAGNOSIS — D47.2 MGUS (MONOCLONAL GAMMOPATHY OF UNKNOWN SIGNIFICANCE): ICD-10-CM

## 2021-04-27 LAB
CHOLEST SERPL-MCNC: 288 MG/DL (ref 0–200)
HDLC SERPL-MCNC: 32 MG/DL (ref 40–60)
LDLC SERPL CALC-MCNC: 227 MG/DL (ref 0–100)
LDLC/HDLC SERPL: 7.06 {RATIO}
PSA SERPL-MCNC: 2.36 NG/ML (ref 0–4)
TRIGL SERPL-MCNC: 150 MG/DL (ref 0–150)
VLDLC SERPL-MCNC: 29 MG/DL (ref 5–40)

## 2021-04-27 PROCEDURE — 80061 LIPID PANEL: CPT | Performed by: NURSE PRACTITIONER

## 2021-04-27 PROCEDURE — 1126F AMNT PAIN NOTED NONE PRSNT: CPT | Performed by: NURSE PRACTITIONER

## 2021-04-27 PROCEDURE — 36415 COLL VENOUS BLD VENIPUNCTURE: CPT | Performed by: NURSE PRACTITIONER

## 2021-04-27 PROCEDURE — 96160 PT-FOCUSED HLTH RISK ASSMT: CPT | Performed by: NURSE PRACTITIONER

## 2021-04-27 PROCEDURE — G0103 PSA SCREENING: HCPCS | Performed by: NURSE PRACTITIONER

## 2021-04-27 PROCEDURE — 1170F FXNL STATUS ASSESSED: CPT | Performed by: NURSE PRACTITIONER

## 2021-04-27 PROCEDURE — 99214 OFFICE O/P EST MOD 30 MIN: CPT | Performed by: NURSE PRACTITIONER

## 2021-04-27 PROCEDURE — G0439 PPPS, SUBSEQ VISIT: HCPCS | Performed by: NURSE PRACTITIONER

## 2021-04-27 PROCEDURE — 1159F MED LIST DOCD IN RCRD: CPT | Performed by: NURSE PRACTITIONER

## 2021-04-27 NOTE — PATIENT INSTRUCTIONS
Medicare Wellness  Personal Prevention Plan of Service     Date of Office Visit:  2021  Encounter Provider:  RAYMOND Gonsales  Place of Service:  [facility]  Patient Name: Jayden Marquez  :  1940    As part of the Medicare Wellness portion of your visit today, we are providing you with this personalized preventive plan of services (PPPS). This plan is based upon recommendations of the [place] Preventive Services Task Force (USPSTF) and the Advisory Committee on Immunization Practices (ACIP).    This lists the preventive care services that should be considered, and provides dates of when you are due. Items listed as completed are up-to-date and do not require any further intervention.    Health Maintenance   Topic Date Due   • ZOSTER VACCINE (1 of 2) Never done   • Pneumococcal Vaccine 65+ (1 of 1 - PPSV23) Never done   • HEPATITIS C SCREENING  Never done   • INFLUENZA VACCINE  2021   • ANNUAL WELLNESS VISIT  2022   • TDAP/TD VACCINES (2 - Td) 2022   • COLONOSCOPY  2029   • COVID-19 Vaccine  Completed       Orders Placed This Encounter   Procedures   • Lipid panel     Order Specific Question:   Release to patient     Answer:   Immediate   • PSA Screen     Order Specific Question:   Release to patient     Answer:   Immediate   • Ambulatory Referral to Neurology     Referral Priority:   Routine     Referral Type:   Consultation     Referral Reason:   Specialty Services Required     Referred to Provider:   Kary Harrell MD     Requested Specialty:   Neurology     Number of Visits Requested:   1       Return if symptoms worsen or fail to improve.        Labs today will call with results.   Medicare wellness today,   Refer to neurology will call with appt.   List of psych given he will call for appt,   milagros and [facility] reviewed today. Advised he must see psych to cont daily xanax rx. Will refill until he sees psych to avoid withdrawal.    Deferred cece,   Cont f/u with GI and oncology as scheduled.   Patient agrees with plan of care and understands instructions. Call if worsening symptoms or any problems or concerns.

## 2021-04-27 NOTE — PROGRESS NOTES
The ABCs of the Annual Wellness Visit  Subsequent Medicare Wellness Visit    Chief Complaint   Patient presents with   • Shaking     STARTED AFTER HAD COLONOSCOPY LAST NOVEMBER       Subjective   History of Present Illness:  Jayden Marquez is a 81 y.o. male who presents for a Subsequent Medicare Wellness Visit.    HEALTH RISK ASSESSMENT    Recent Hospitalizations:  No hospitalization(s) within the last year.    Current Medical Providers:  Patient Care Team:  Evangelina Shea APRN as PCP - General (Nurse Practitioner)  Kaley Olvera MD as Consulting Physician (Hematology and Oncology)  Chase Mahoney MD as Consulting Physician (Hematology and Oncology)    Smoking Status:  Social History     Tobacco Use   Smoking Status Current Every Day Smoker   • Packs/day: 0.25   • Years: 50.00   • Pack years: 12.50   • Types: Cigarettes   Smokeless Tobacco Never Used       Alcohol Consumption:  Social History     Substance and Sexual Activity   Alcohol Use No       Depression Screen:   PHQ-2/PHQ-9 Depression Screening 4/27/2021   Little interest or pleasure in doing things 0   Feeling down, depressed, or hopeless 0   Trouble falling or staying asleep, or sleeping too much 0   Feeling tired or having little energy 0   Poor appetite or overeating 0   Feeling bad about yourself - or that you are a failure or have let yourself or your family down 0   Trouble concentrating on things, such as reading the newspaper or watching television 0   Moving or speaking so slowly that other people could have noticed. Or the opposite - being so fidgety or restless that you have been moving around a lot more than usual 0   Thoughts that you would be better off dead, or of hurting yourself in some way 0   Total Score 0   If you checked off any problems, how difficult have these problems made it for you to do your work, take care of things at home, or get along with other people? Not difficult at all       Fall Risk Screen:  STEADI Fall Risk  Assessment has not been completed.    Health Habits and Functional and Cognitive Screening:  Functional & Cognitive Status 4/27/2021   Do you have difficulty preparing food and eating? No   Do you have difficulty bathing yourself, getting dressed or grooming yourself? No   Do you have difficulty using the toilet? No   Do you have difficulty moving around from place to place? No   Do you have trouble with steps or getting out of a bed or a chair? No   Current Diet Well Balanced Diet   Dental Exam Up to date   Eye Exam Up to date   Exercise (times per week) 0 times per week   Current Exercises Include No Regular Exercise   Current Exercise Activities Include -   Do you need help using the phone?  No   Are you deaf or do you have serious difficulty hearing?  No   Do you need help with transportation? No   Do you need help shopping? No   Do you need help preparing meals?  No   Do you need help with housework?  No   Do you need help with laundry? No   Do you need help taking your medications? No   Do you need help managing money? No   Do you ever drive or ride in a car without wearing a seat belt? No   Have you felt unusual stress, anger or loneliness in the last month? No   Who do you live with? Spouse   If you need help, do you have trouble finding someone available to you? No   Have you been bothered in the last four weeks by sexual problems? No   Do you have difficulty concentrating, remembering or making decisions? No         Does the patient have evidence of cognitive impairment? No    Asprin use counseling:Contraindicated from taking ASA    Age-appropriate Screening Schedule:  Refer to the list below for future screening recommendations based on patient's age, sex and/or medical conditions. Orders for these recommended tests are listed in the plan section. The patient has been provided with a written plan.    Health Maintenance   Topic Date Due   • ZOSTER VACCINE (1 of 2) Never done   • INFLUENZA VACCINE   08/01/2021   • TDAP/TD VACCINES (2 - Td) 11/09/2022   • COLONOSCOPY  09/30/2029          The following portions of the patient's history were reviewed and updated as appropriate: allergies, current medications, past family history, past medical history, past social history, past surgical history and problem list.    Outpatient Medications Prior to Visit   Medication Sig Dispense Refill   • ALPRAZolam (XANAX) 0.25 MG tablet TAKE 1 TABLET BY MOUTH THREE TIMES DAILY AS NEEDED FOR ANXIETY 30 tablet 0   • FLUoxetine (PROzac) 20 MG capsule TAKE 1 CAPSULE BY MOUTH DAILY 90 capsule 0   • propranolol (INDERAL) 20 MG tablet TAKE HALF TO 1 TABLET EVERY MORNING AND AFTERNOON 180 tablet 1   • Budesonide (ENTOCORT EC) 3 MG 24 hr capsule Take 3 capsules by mouth Every Morning. 90 capsule 3   • diphenhydrAMINE-acetaminophen (TYLENOL PM)  MG tablet per tablet Take 1 tablet by mouth At Night As Needed for Sleep.     • hydrOXYzine (ATARAX) 25 MG tablet Take 1 tablet by mouth Every 8 (Eight) Hours As Needed for Anxiety. 30 tablet 6   • mesalamine (Apriso) 0.375 g 24 hr capsule Take 4 capsules by mouth Daily. 120 capsule 11     No facility-administered medications prior to visit.       Patient Active Problem List   Diagnosis   • MGUS (monoclonal gammopathy of unknown significance)   • Change in bowel habits   • Rectal bleeding   • Pharyngitis   • Fracture, finger, distal phalanx, open   • Flu-like symptoms   • Left sided colitis with rectal bleeding (CMS/HCC)   • Fecal urgency       Advanced Care Planning:  ACP discussion was held with the patient during this visit. Patient does not have an advance directive, information provided.    Review of Systems    Compared to one year ago, the patient feels his physical health is the same.  Compared to one year ago, the patient feels his mental health is the same.    Reviewed chart for potential of high risk medication in the elderly: yes  Reviewed chart for potential of harmful drug  "interactions in the elderly:yes    Objective         Vitals:    04/27/21 1022   BP: 110/60   BP Location: Left arm   Patient Position: Sitting   Cuff Size: Adult   Pulse: 64   Temp: 97.7 °F (36.5 °C)   TempSrc: Infrared   SpO2: 98%   Weight: 67.6 kg (149 lb)   Height: 177.8 cm (70\")   PainSc: 0-No pain       Body mass index is 21.38 kg/m².  Discussed the patient's BMI with him. The BMI is in the acceptable range.    Physical Exam          Assessment/Plan   Medicare Risks and Personalized Health Plan  CMS Preventative Services Quick Reference  Advance Directive Discussion  Diabetic Lab Screening   Lung Cancer Risk  Prostate Cancer Screening     The above risks/problems have been discussed with the patient.  Pertinent information has been shared with the patient in the After Visit Summary.  Follow up plans and orders are seen below in the Assessment/Plan Section.    Diagnoses and all orders for this visit:    1. Tremor (Primary)    2. Left sided colitis with rectal bleeding (CMS/HCC)    3. SHELIA (generalized anxiety disorder)  -     Ambulatory Referral to Neurology      Follow Up:  No follow-ups on file.     An After Visit Summary and PPPS were given to the patient.               "

## 2021-04-27 NOTE — PROGRESS NOTES
"Chief Complaint  Shaking (STARTED AFTER HAD COLONOSCOPY LAST NOVEMBER)    Subjective          Jayden Marquez presents to [facility]  History of Present Illness   Here today to establish care, prev pcp Dr. Anaya,   C/o tremor, states he had colonoscopy 9/2019, states symptoms started after colonoscopy, sees GI, last visit 2/2021, with colitis, states he has had rectal bleeding, but denies any recent bleeding. He states he noticed shaking more with activity such as eating. He also noticed with writing.   With anxiety, taking prozac 20mg daily,xanax bid, he does not see psych. He states anxiety is well controlled on these medications. Denies family hx of tremor. States he only noticed shaking in hand, does not see neurology. He is taking propanolol 20mg daily.   With MGUS, sees oncology Dr. Soliman.     Objective   Vital Signs:   /60 (BP Location: Left arm, Patient Position: Sitting, Cuff Size: Adult)   Pulse 64   Temp 97.7 °F (36.5 °C) (Infrared)   Ht 177.8 cm (70\")   Wt 67.6 kg (149 lb)   SpO2 98%   BMI 21.38 kg/m²     Physical Exam  Vitals and nursing note reviewed.   Constitutional:       Appearance: He is well-developed.   HENT:      Head: Normocephalic.   Eyes:      Pupils: Pupils are equal, round, and reactive to light.   Cardiovascular:      Rate and Rhythm: Normal rate and regular rhythm.      Heart sounds: Normal heart sounds.   Pulmonary:      Effort: Pulmonary effort is normal.      Breath sounds: Normal breath sounds.   Skin:     General: Skin is warm and dry.   Neurological:      Mental Status: He is alert and oriented to person, place, and time.      Cranial Nerves: Cranial nerves are intact.      Sensory: Sensation is intact.      Motor: Tremor present.      Coordination: Coordination is intact.      Gait: Gait is intact.   Psychiatric:         Attention and Perception: Attention normal.         Mood and Affect: Mood normal.         Speech: Speech normal.    "      Behavior: Behavior normal.         Thought Content: Thought content normal.         Cognition and Memory: Cognition normal.         Judgment: Judgment normal.        Result Review :                 Assessment and Plan    Diagnoses and all orders for this visit:    1. Medicare annual wellness visit, subsequent (Primary)  -     Lipid panel  -     PSA Screen    2. Tremor    3. Left sided colitis with rectal bleeding (CMS/HCC)    4. SHELIA (generalized anxiety disorder)  -     Ambulatory Referral to Neurology    5. Prostate cancer screening  -     Lipid panel  -     PSA Screen    6. Encounter for lipid screening for cardiovascular disease   -     Lipid panel    7. Medication monitoring encounter  -     Compliance Drug Analysis, Ur - Urine, Clean Catch    8. MGUS (monoclonal gammopathy of unknown significance)        Follow Up   Return if symptoms worsen or fail to improve.  Patient was given instructions and counseling regarding his condition or for health maintenance advice. Please see specific information pulled into the AVS if appropriate.     Labs today will call with results.   Medicare wellness today,   Refer to neurology will call with appt.   List of psych given he will call for appt,   milagros and UDS reviewed today. Advised he must see psych to cont daily xanax rx. Will refill until he sees psych to avoid withdrawal.   Deferred cece,   Cont f/u with GI and oncology as scheduled.   Patient agrees with plan of care and understands instructions. Call if worsening symptoms or any problems or concerns.

## 2021-04-28 ENCOUNTER — TELEPHONE (OUTPATIENT)
Dept: FAMILY MEDICINE CLINIC | Facility: CLINIC | Age: 81
End: 2021-04-28

## 2021-04-28 NOTE — TELEPHONE ENCOUNTER
PATIENT STATES NEUROLOGY REFERRAL IS TOO FAR AWAY. PATIENT IS NOT DRIVING THAT FAR. PATIENT NEEDS REFERRAL TO SOMEONE CLOSER TO HOME.      PLEASE CALL: [phone]

## 2021-04-29 ENCOUNTER — LAB (OUTPATIENT)
Dept: FAMILY MEDICINE CLINIC | Facility: CLINIC | Age: 81
End: 2021-04-29

## 2021-04-29 DIAGNOSIS — E78.5 DYSLIPIDEMIA: ICD-10-CM

## 2021-04-29 DIAGNOSIS — E78.5 DYSLIPIDEMIA: Primary | ICD-10-CM

## 2021-05-03 DIAGNOSIS — F41.1 GAD (GENERALIZED ANXIETY DISORDER): ICD-10-CM

## 2021-05-04 RX ORDER — ALPRAZOLAM 0.25 MG/1
TABLET ORAL
Qty: 30 TABLET | Refills: 0 | Status: SHIPPED | OUTPATIENT
Start: 2021-05-04 | End: 2021-05-19 | Stop reason: SDUPTHER

## 2021-05-04 NOTE — TELEPHONE ENCOUNTER
Will send in refill but he needs to see psych for future refills as discussed. Make sure he has made psych appt.

## 2021-05-18 RX ORDER — PROPRANOLOL HYDROCHLORIDE 20 MG/1
TABLET ORAL
Qty: 180 TABLET | Refills: 1 | Status: SHIPPED | OUTPATIENT
Start: 2021-05-18 | End: 2021-06-22 | Stop reason: SDUPTHER

## 2021-05-18 RX ORDER — FLUOXETINE HYDROCHLORIDE 20 MG/1
20 CAPSULE ORAL DAILY
Qty: 90 CAPSULE | Refills: 0 | Status: SHIPPED | OUTPATIENT
Start: 2021-05-18 | End: 2021-06-22 | Stop reason: SDUPTHER

## 2021-05-19 DIAGNOSIS — F41.1 GAD (GENERALIZED ANXIETY DISORDER): ICD-10-CM

## 2021-05-20 RX ORDER — ALPRAZOLAM 0.25 MG/1
0.25 TABLET ORAL 3 TIMES DAILY PRN
Qty: 12 TABLET | Refills: 0 | Status: SHIPPED | OUTPATIENT
Start: 2021-05-20 | End: 2021-12-13 | Stop reason: SDUPTHER

## 2021-06-22 RX ORDER — FLUOXETINE HYDROCHLORIDE 20 MG/1
20 CAPSULE ORAL DAILY
Qty: 90 CAPSULE | Refills: 0 | Status: SHIPPED | OUTPATIENT
Start: 2021-06-22 | End: 2021-09-17 | Stop reason: DRUGHIGH

## 2021-06-22 RX ORDER — PROPRANOLOL HYDROCHLORIDE 20 MG/1
TABLET ORAL
Qty: 180 TABLET | Refills: 1 | Status: SHIPPED | OUTPATIENT
Start: 2021-06-22 | End: 2021-10-28

## 2021-09-17 ENCOUNTER — OFFICE VISIT (OUTPATIENT)
Dept: FAMILY MEDICINE CLINIC | Facility: CLINIC | Age: 81
End: 2021-09-17

## 2021-09-17 VITALS
HEIGHT: 70 IN | SYSTOLIC BLOOD PRESSURE: 110 MMHG | OXYGEN SATURATION: 98 % | BODY MASS INDEX: 21.05 KG/M2 | HEART RATE: 63 BPM | TEMPERATURE: 97.7 F | WEIGHT: 147 LBS | DIASTOLIC BLOOD PRESSURE: 70 MMHG | RESPIRATION RATE: 15 BRPM

## 2021-09-17 DIAGNOSIS — F41.9 ANXIETY: ICD-10-CM

## 2021-09-17 DIAGNOSIS — G25.2 COARSE TREMORS: ICD-10-CM

## 2021-09-17 DIAGNOSIS — K51.511 LEFT SIDED COLITIS WITH RECTAL BLEEDING (HCC): Primary | ICD-10-CM

## 2021-09-17 DIAGNOSIS — D47.2 MGUS (MONOCLONAL GAMMOPATHY OF UNKNOWN SIGNIFICANCE): ICD-10-CM

## 2021-09-17 PROCEDURE — 99214 OFFICE O/P EST MOD 30 MIN: CPT | Performed by: INTERNAL MEDICINE

## 2021-09-17 RX ORDER — SULFASALAZINE 500 MG/1
500 TABLET ORAL 3 TIMES DAILY
Qty: 90 TABLET | Refills: 3 | Status: SHIPPED | OUTPATIENT
Start: 2021-09-17 | End: 2022-12-30

## 2021-09-17 RX ORDER — FLUOXETINE 10 MG/1
10 CAPSULE ORAL DAILY
COMMUNITY
End: 2021-09-17 | Stop reason: DRUGHIGH

## 2021-09-17 RX ORDER — FLUOXETINE HYDROCHLORIDE 40 MG/1
40 CAPSULE ORAL DAILY
Qty: 30 CAPSULE
Start: 2021-09-17 | End: 2022-02-18 | Stop reason: SDUPTHER

## 2021-09-17 RX ORDER — PROPRANOLOL HYDROCHLORIDE 10 MG/1
10 TABLET ORAL 2 TIMES DAILY
COMMUNITY
End: 2021-09-17 | Stop reason: DRUGHIGH

## 2021-09-17 NOTE — PROGRESS NOTES
Subjective   Jayden Marquez is a 81 y.o. male.     Vitals:    09/17/21 1015   BP: 110/70   Pulse: 63   Resp: 15   Temp: 97.7 °F (36.5 °C)   SpO2: 98%      Body mass index is 21.09 kg/m².     History of Present Illness     The following portions of the patient's history were reviewed and updated as appropriate: allergies, current medications, past family history, past medical history, past social history, past surgical history and problem list.  Patient was seen for bloody diarrhea.  Patient has recently had a colonoscopy that showed colitis.  Patient was placed on mesalamine 0.375 g 4 tablets daily.  Patient states that his co-pay on this is over $100.  Per month.  Patient states this is too expensive.  Patient was advised to contact his GI doctor.  Patient was given sulfasalazine 500 mg 3 times daily daily.  Patient is going to follow-up with his gastroenterologist.  Patient does have a history of MUGS.  Patient is followed up with hematology.  Patient does have anxiety he is being treated with duloxetine 40 mg.  Patient also has essential tremor.  Patient was given propanolol 20 mg twice daily.  Patient was taking half a tablet twice a day.  Patient was advised to take a whole tablet 3 times daily a day.  Patient will follow up in 1 month.    Dictated utilizing Dragon dictation. If there are questions or for further clarification, please contact me.    Review of Systems   Constitutional: Negative for fatigue and fever.   HENT: Positive for congestion. Negative for trouble swallowing.    Eyes: Negative for discharge and visual disturbance.   Respiratory: Negative for choking and shortness of breath.    Cardiovascular: Negative for chest pain and palpitations.   Gastrointestinal: Negative for abdominal pain and blood in stool.   Endocrine: Negative.    Genitourinary: Negative for genital sores and hematuria.   Musculoskeletal: Negative for gait problem and joint swelling.   Skin: Negative for color change, pallor,  rash and wound.   Allergic/Immunologic: Positive for environmental allergies. Negative for immunocompromised state.   Neurological: Positive for tremors. Negative for facial asymmetry and speech difficulty.   Psychiatric/Behavioral: Positive for dysphoric mood. Negative for hallucinations and suicidal ideas. The patient is nervous/anxious.        Objective   Physical Exam    Assessment/Plan #1 DC mesalamine and began sulfasalazine 500 mg 3 times daily #2 Inderal 20 mg 3 times daily #3 fluoxetine 40 mg daily.  #4 follow-up in 1 month.  Problems Addressed this Visit     Gastrointestinal Abdominal             Left sided colitis with rectal bleeding (CMS/HCC) - Primary          Hematology and Neoplasia            MGUS (monoclonal gammopathy of unknown significance)          Mental Health            Anxiety          Neuro            Coarse tremors            Diagnoses     Diagnosis Codes Comments    Left sided colitis with rectal bleeding (CMS/HCC)    -  Primary ICD-10-CM: K51.511  ICD-9-CM: 556.5, 569.3     MGUS (monoclonal gammopathy of unknown significance)     ICD-10-CM: D47.2  ICD-9-CM: 273.1     Anxiety     ICD-10-CM: F41.9  ICD-9-CM: 300.00     Coarse tremors     ICD-10-CM: G25.2  ICD-9-CM: 781.0

## 2021-10-21 ENCOUNTER — OFFICE VISIT (OUTPATIENT)
Dept: FAMILY MEDICINE CLINIC | Facility: CLINIC | Age: 81
End: 2021-10-21

## 2021-10-21 VITALS
HEIGHT: 70 IN | RESPIRATION RATE: 20 BRPM | BODY MASS INDEX: 20.47 KG/M2 | DIASTOLIC BLOOD PRESSURE: 70 MMHG | WEIGHT: 143 LBS | TEMPERATURE: 97.8 F | SYSTOLIC BLOOD PRESSURE: 124 MMHG | OXYGEN SATURATION: 98 % | HEART RATE: 81 BPM

## 2021-10-21 DIAGNOSIS — D47.2 MGUS (MONOCLONAL GAMMOPATHY OF UNKNOWN SIGNIFICANCE): Primary | ICD-10-CM

## 2021-10-21 DIAGNOSIS — R25.1 PILL ROLLING TREMORS: ICD-10-CM

## 2021-10-21 DIAGNOSIS — F41.9 ANXIETY: ICD-10-CM

## 2021-10-21 DIAGNOSIS — R29.898 WEAKNESS OF LOWER EXTREMITY, UNSPECIFIED LATERALITY: ICD-10-CM

## 2021-10-21 DIAGNOSIS — E78.2 MIXED HYPERLIPIDEMIA: ICD-10-CM

## 2021-10-21 PROCEDURE — 99214 OFFICE O/P EST MOD 30 MIN: CPT | Performed by: INTERNAL MEDICINE

## 2021-10-21 RX ORDER — ATORVASTATIN CALCIUM 40 MG/1
40 TABLET, FILM COATED ORAL NIGHTLY
Qty: 90 TABLET | Refills: 1 | Status: SHIPPED | OUTPATIENT
Start: 2021-10-21 | End: 2022-02-18 | Stop reason: SDUPTHER

## 2021-10-21 NOTE — PATIENT INSTRUCTIONS
Fat and Cholesterol Restricted Eating Plan  Eating a diet that limits fat and cholesterol may help lower your risk for heart disease and other conditions. Your body needs fat and cholesterol for basic functions, but eating too much of these things can be harmful to your health.  Your health care provider may order lab tests to check your blood fat (lipid) and cholesterol levels. This helps your health care provider understand your risk for certain conditions and whether you need to make diet changes. Work with your health care provider or dietitian to make an eating plan that is right for you.  Your plan includes:  · Limit your fat intake to ______% or less of your total calories a day.  · Limit your saturated fat intake to ______% or less of your total calories a day.  · Limit the amount of cholesterol in your diet to less than _________mg a day.  · Eat ___________ g of fiber a day.  What are tips for following this plan?  General guidelines    · If you are overweight, work with your health care provider to lose weight safely. Losing just 5-10% of your body weight can improve your overall health and help prevent diseases such as diabetes and heart disease.  · Avoid:  ? Foods with added sugar.  ? Fried foods.  ? Foods that contain partially hydrogenated oils, including stick margarine, some tub margarines, cookies, crackers, and other baked goods.  · Limit alcohol intake to no more than 1 drink a day for nonpregnant women and 2 drinks a day for men. One drink equals 12 oz of beer, 5 oz of wine, or 1½ oz of hard liquor.    Reading food labels  · Check food labels for:  ? Trans fats, partially hydrogenated oils, or high amounts of saturated fat. Avoid foods that contain saturated fat and trans fat.  ? The amount of cholesterol in each serving. Try to eat no more than 200 mg of cholesterol each day.  ? The amount of fiber in each serving. Try to eat at least 20-30 g of fiber each day.  · Choose foods with healthy fats,  "such as:  ? Monounsaturated and polyunsaturated fats. These include olive and canola oil, flaxseeds, walnuts, almonds, and seeds.  ? Omega-3 fats. These are found in foods such as salmon, mackerel, sardines, tuna, flaxseed oil, and ground flaxseeds.  · Choose grain products that have whole grains. Look for the word \"whole\" as the first word in the ingredient list.  Cooking  · Cook foods using methods other than frying. Baking, boiling, grilling, and broiling are some healthy options.  · Eat more home-cooked food and less restaurant, buffet, and fast food.  · Avoid cooking using saturated fats.  ? Animal sources of saturated fats include meats, butter, and cream.  ? Plant sources of saturated fats include palm oil, palm kernel oil, and coconut oil.  Meal planning    · At meals, imagine dividing your plate into fourths:  ? Fill one-half of your plate with vegetables and green salads.  ? Fill one-fourth of your plate with whole grains.  ? Fill one-fourth of your plate with lean protein foods.  · Eat fish that is high in omega-3 fats at least two times a week.  · Eat more foods that contain fiber, such as whole grains, beans, apples, broccoli, carrots, peas, and barley. These foods help promote healthy cholesterol levels in the blood.    Recommended foods  Grains  · Whole grains, such as whole wheat or whole grain breads, crackers, cereals, and pasta. Unsweetened oatmeal, bulgur, barley, quinoa, or brown rice. Corn or whole wheat flour tortillas.  Vegetables  · Fresh or frozen vegetables (raw, steamed, roasted, or grilled). Green salads.  Fruits  · All fresh, canned (in natural juice), or frozen fruits.  Meats and other protein foods  · Ground beef (85% or leaner), grass-fed beef, or beef trimmed of fat. Skinless chicken or turkey. Ground chicken or turkey. Pork trimmed of fat. All fish and seafood. Egg whites. Dried beans, peas, or lentils. Unsalted nuts or seeds. Unsalted canned beans. Natural nut butters without " added sugar and oil.  Dairy  · Low-fat or nonfat dairy products, such as skim or 1% milk, 2% or reduced-fat cheeses, low-fat and fat-free ricotta or cottage cheese, or plain low-fat and nonfat yogurt.  Fats and oils  · Tub margarine without trans fats. Light or reduced-fat mayonnaise and salad dressings. Avocado. Olive, canola, sesame, or safflower oils.  The items listed above may not be a complete list of recommended foods or beverages. Contact your dietitian for more options.  Foods to avoid  Grains  · White bread. White pasta. White rice. Cornbread. Bagels, pastries, and croissants. Crackers and snack foods that contain trans fat and hydrogenated oils.  Vegetables  · Vegetables cooked in cheese, cream, or butter sauce. Fried vegetables.  Fruits  · Canned fruit in heavy syrup. Fruit in cream or butter sauce. Fried fruit.  Meats and other protein foods  · Fatty cuts of meat. Ribs, chicken wings, barajas, sausage, bologna, salami, chitterlings, fatback, hot dogs, bratwurst, and packaged lunch meats. Liver and organ meats. Whole eggs and egg yolks. Chicken and turkey with skin. Fried meat.  Dairy  · Whole or 2% milk, cream, half-and-half, and cream cheese. Whole milk cheeses. Whole-fat or sweetened yogurt. Full-fat cheeses. Nondairy creamers and whipped toppings. Processed cheese, cheese spreads, and cheese curds.  Beverages  · Alcohol. Sugar-sweetened drinks such as sodas, lemonade, and fruit drinks.  Fats and oils  · Butter, stick margarine, lard, shortening, ghee, or barajas fat. Coconut, palm kernel, and palm oils.  Sweets and desserts  · Corn syrup, sugars, honey, and molasses. Candy. Jam and jelly. Syrup. Sweetened cereals. Cookies, pies, cakes, donuts, muffins, and ice cream.  The items listed above may not be a complete list of foods and beverages to avoid. Contact your dietitian for more information.  The items listed above may not be a complete list of foods and beverages [you/your child] should avoid. Contact  a dietitian for more information.  Summary  · Your body needs fat and cholesterol for basic functions. However, eating too much of these things can be harmful to your health.  · Work with your health care provider and dietitian to follow a diet low in fat and cholesterol. Doing this may help lower your risk for heart disease and other conditions.  · Choose healthy fats, such as monounsaturated and polyunsaturated fats, and foods high in omega-3 fatty acids.  · Eat fiber-rich foods, such as whole grains, beans, peas, fruits, and vegetables.  · Limit or avoid alcohol, fried foods, and foods high in saturated fats, partially hydrogenated oils, and sugar.  This information is not intended to replace advice given to you by your health care provider. Make sure you discuss any questions you have with your health care provider.  Document Revised: 07/06/2021 Document Reviewed: 04/21/2021  [employer] Patient Education © 2021 [employer]

## 2021-10-21 NOTE — PROGRESS NOTES
Subjective   Jayden Marquez is a 81 y.o. male.     Vitals:    10/21/21 1510   BP: 124/70   Pulse: 81   Resp: 20   Temp: 97.8 °F (36.6 °C)   SpO2: 98%      Body mass index is 20.52 kg/m².     History of Present Illness   Patient was seen for a MGUS patient had been seeing Dr. Soliman.  But he stopped seeing him about 1 month ago.  Patient was encouraged to return.  Patient is being scheduled for a follow-up visit.  Patient does have tremors and very unsteady on his gait.  Patient does have a walker and cane at home.  Patient was advised to use them.  Patient was referred to physical therapy and to neurology to evaluate for parkinsonism.  Patient also has some anxiety has been relieved with Xanax 0.25 mg 3 times daily, propranolol 20 mg 1/2 tablet twice daily.  Patient's symptoms seem to be better on this medication.  Patient will return in 1 months.    Dictated utilizing Dragon dictation. If there are questions or for further clarification, please contact me.  The following portions of the patient's history were reviewed and updated as appropriate: allergies, current medications, past family history, past medical history, past social history, past surgical history and problem list.    Review of Systems   Constitutional: Negative for fatigue and fever.   HENT: Positive for congestion. Negative for trouble swallowing.    Eyes: Negative for discharge and visual disturbance.   Respiratory: Negative for choking and shortness of breath.    Cardiovascular: Negative for chest pain and palpitations.   Gastrointestinal: Negative for abdominal pain and blood in stool.   Endocrine: Negative.    Genitourinary: Negative for genital sores and hematuria.   Musculoskeletal: Negative for gait problem and joint swelling.   Skin: Negative for color change, pallor, rash and wound.   Allergic/Immunologic: Positive for environmental allergies. Negative for immunocompromised state.   Neurological: Positive for tremors and weakness. Negative  for facial asymmetry and speech difficulty.   Psychiatric/Behavioral: Negative for hallucinations and suicidal ideas.       Objective   Physical Exam  Vitals and nursing note reviewed.   Constitutional:       Appearance: Normal appearance. He is well-developed.   HENT:      Head: Normocephalic and atraumatic.      Nose: Nose normal.      Mouth/Throat:      Mouth: Mucous membranes are moist.      Pharynx: Oropharynx is clear.   Eyes:      Extraocular Movements: Extraocular movements intact.      Conjunctiva/sclera: Conjunctivae normal.      Pupils: Pupils are equal, round, and reactive to light.   Cardiovascular:      Rate and Rhythm: Normal rate and regular rhythm.      Heart sounds: Normal heart sounds. No murmur heard.  No friction rub. No gallop.    Pulmonary:      Effort: Pulmonary effort is normal. No respiratory distress.      Breath sounds: Normal breath sounds. No stridor. No wheezing, rhonchi or rales.   Chest:      Chest wall: No tenderness.   Abdominal:      General: Bowel sounds are normal.      Palpations: Abdomen is soft.   Musculoskeletal:         General: Normal range of motion.      Cervical back: Normal range of motion and neck supple.   Skin:     General: Skin is warm and dry.   Neurological:      General: No focal deficit present.      Mental Status: He is alert and oriented to person, place, and time. Mental status is at baseline.      Motor: Weakness present.      Coordination: Coordination abnormal.      Gait: Gait abnormal.   Psychiatric:         Behavior: Behavior normal.         Thought Content: Thought content normal.         Judgment: Judgment normal.      Comments: Patient has moderate anxiety         Assessment/Plan #1 return to see  #2 evaluation by neurology and physical therapy #3 continue present treatment for anxiety  Problems Addressed this Visit        Cardiac and Vasculature    Mixed hyperlipidemia    Relevant Medications    atorvastatin (Lipitor) 40 MG tablet    Other  Relevant Orders    Hepatic Function Panel    Comprehensive Metabolic Panel    Lipid Panel       Hematology and Neoplasia    MGUS (monoclonal gammopathy of unknown significance) - Primary    Relevant Orders    Ambulatory Referral to Hematology       Mental Health    Anxiety      Other Visit Diagnoses     Weakness of lower extremity, unspecified laterality        Relevant Orders    Ambulatory Referral to Physical Therapy Evaluate and treat, Ortho    Ambulatory Referral to Neurology    Pill rolling tremors        Relevant Orders    Ambulatory Referral to Neurology      Diagnoses     Diagnosis Codes Comments    MGUS (monoclonal gammopathy of unknown significance)    -  Primary ICD-10-CM: D47.2  ICD-9-CM: 273.1     Anxiety     ICD-10-CM: F41.9  ICD-9-CM: 300.00     Weakness of lower extremity, unspecified laterality     ICD-10-CM: R29.898  ICD-9-CM: 729.89     Pill rolling tremors     ICD-10-CM: R25.1  ICD-9-CM: 781.0     Mixed hyperlipidemia     ICD-10-CM: E78.2  ICD-9-CM: 272.2

## 2021-10-28 ENCOUNTER — LAB (OUTPATIENT)
Dept: LAB | Facility: HOSPITAL | Age: 81
End: 2021-10-28

## 2021-10-28 ENCOUNTER — OFFICE VISIT (OUTPATIENT)
Dept: NEUROLOGY | Facility: CLINIC | Age: 81
End: 2021-10-28

## 2021-10-28 VITALS
WEIGHT: 143.08 LBS | RESPIRATION RATE: 16 BRPM | BODY MASS INDEX: 20.48 KG/M2 | HEART RATE: 68 BPM | HEIGHT: 70 IN | DIASTOLIC BLOOD PRESSURE: 70 MMHG | SYSTOLIC BLOOD PRESSURE: 110 MMHG

## 2021-10-28 DIAGNOSIS — G62.9 NEUROPATHY: ICD-10-CM

## 2021-10-28 DIAGNOSIS — G25.0 ESSENTIAL TREMOR: Primary | ICD-10-CM

## 2021-10-28 DIAGNOSIS — E78.2 MIXED HYPERLIPIDEMIA: ICD-10-CM

## 2021-10-28 DIAGNOSIS — G25.0 ESSENTIAL TREMOR: ICD-10-CM

## 2021-10-28 LAB
FOLATE SERPL-MCNC: 5.85 NG/ML (ref 4.78–24.2)
TSH SERPL DL<=0.05 MIU/L-ACNC: 2.19 UIU/ML (ref 0.27–4.2)
VIT B12 BLD-MCNC: 320 PG/ML (ref 211–946)

## 2021-10-28 PROCEDURE — 36415 COLL VENOUS BLD VENIPUNCTURE: CPT | Performed by: STUDENT IN AN ORGANIZED HEALTH CARE EDUCATION/TRAINING PROGRAM

## 2021-10-28 PROCEDURE — 83655 ASSAY OF LEAD: CPT | Performed by: STUDENT IN AN ORGANIZED HEALTH CARE EDUCATION/TRAINING PROGRAM

## 2021-10-28 PROCEDURE — 83825 ASSAY OF MERCURY: CPT | Performed by: STUDENT IN AN ORGANIZED HEALTH CARE EDUCATION/TRAINING PROGRAM

## 2021-10-28 PROCEDURE — 82175 ASSAY OF ARSENIC: CPT | Performed by: STUDENT IN AN ORGANIZED HEALTH CARE EDUCATION/TRAINING PROGRAM

## 2021-10-28 PROCEDURE — 84443 ASSAY THYROID STIM HORMONE: CPT | Performed by: STUDENT IN AN ORGANIZED HEALTH CARE EDUCATION/TRAINING PROGRAM

## 2021-10-28 PROCEDURE — 99204 OFFICE O/P NEW MOD 45 MIN: CPT | Performed by: STUDENT IN AN ORGANIZED HEALTH CARE EDUCATION/TRAINING PROGRAM

## 2021-10-28 PROCEDURE — 82607 VITAMIN B-12: CPT

## 2021-10-28 PROCEDURE — 82746 ASSAY OF FOLIC ACID SERUM: CPT

## 2021-10-28 RX ORDER — PROPRANOLOL HYDROCHLORIDE 40 MG/1
40 TABLET ORAL 2 TIMES DAILY
Qty: 60 TABLET | Refills: 5 | Status: SHIPPED | OUTPATIENT
Start: 2021-10-28 | End: 2021-12-09

## 2021-10-28 RX ORDER — GABAPENTIN 300 MG/1
300 CAPSULE ORAL 2 TIMES DAILY
Qty: 60 CAPSULE | Refills: 3 | Status: SHIPPED | OUTPATIENT
Start: 2021-10-28 | End: 2022-01-21 | Stop reason: SDUPTHER
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
## 2021-10-28 NOTE — PATIENT INSTRUCTIONS
Increase propranolol to 40mg to be taken twice a day for shaking. Stop taking this medication and let the clinic know if your systolic blood pressure falls below 90 or heart rate goes below 60.    Start amitriptyline 10mg nightly and go up to 20mg nightly    Physical therapy and occupational therapy will be ordered for you to help with walking and tremor    Labs today.    See your blood specialist doctor and ask if your abnormal blood immunoglobulin levels need to be treated, as this may contribute to nerve pain

## 2021-10-29 ENCOUNTER — TELEPHONE (OUTPATIENT)
Dept: NEUROLOGY | Facility: CLINIC | Age: 81
End: 2021-10-29

## 2021-10-29 NOTE — TELEPHONE ENCOUNTER
----- Message from Phil Black MD sent at 10/29/2021 12:34 AM EDT -----  Please call mail pharmacy at [phone].  I increased the patient's propranolol prescription to 40 mg twice daily and have sent it to a different pharmacy for now.  Any previous prescription for propranolol should be canceled.

## 2021-11-02 LAB
ARSENIC BLD-MCNC: 1 UG/L (ref 2–23)
LEAD BLDV-MCNC: 3 UG/DL (ref 0–4)
MERCURY BLD-MCNC: <1 UG/L (ref 0–14.9)

## 2021-11-03 ENCOUNTER — TELEPHONE (OUTPATIENT)
Dept: ONCOLOGY | Facility: CLINIC | Age: 81
End: 2021-11-03

## 2021-11-03 NOTE — TELEPHONE ENCOUNTER
----- Message from Chase Mahoney MD sent at 11/3/2021  8:51 AM EDT -----  He is already seen for this problem.  I would reschedule him the next 1-2 months at [facility] office.  KEEGAN  ----- Message -----  From: Leola Llamas RegSched Rep  Sent: 11/3/2021   8:40 AM EDT  To: Chase Mahoney MD    Hello,  This patient was last seen 1/28/21. He is now being refereed for  MGUS (monoclonal gammopathy). Please advise.

## 2021-11-08 ENCOUNTER — TELEPHONE (OUTPATIENT)
Dept: ONCOLOGY | Facility: CLINIC | Age: 81
End: 2021-11-08

## 2021-11-08 NOTE — TELEPHONE ENCOUNTER
Caller: Jayden Marquez    Relationship to patient: Self    Best call back number: [phone]    Chief complaint: PT WANTS TO CANCEL FU AND LAB FOR 12/27. HE SAID HE HAS HAD ENOUGH BLOOD DRAWN FROM HIS VEINS AND HE DOES NOT WANT TO COME FOR THE APPT OR R/S.

## 2021-12-09 ENCOUNTER — OFFICE VISIT (OUTPATIENT)
Dept: FAMILY MEDICINE CLINIC | Facility: CLINIC | Age: 81
End: 2021-12-09

## 2021-12-09 VITALS
OXYGEN SATURATION: 96 % | SYSTOLIC BLOOD PRESSURE: 130 MMHG | HEART RATE: 52 BPM | BODY MASS INDEX: 20.9 KG/M2 | HEIGHT: 70 IN | WEIGHT: 146 LBS | DIASTOLIC BLOOD PRESSURE: 62 MMHG | TEMPERATURE: 98.4 F

## 2021-12-09 DIAGNOSIS — G25.2 COARSE TREMORS: Primary | ICD-10-CM

## 2021-12-09 DIAGNOSIS — R20.8 BURNING SENSATION OF FOOT: ICD-10-CM

## 2021-12-09 DIAGNOSIS — F41.1 GAD (GENERALIZED ANXIETY DISORDER): ICD-10-CM

## 2021-12-09 PROCEDURE — 99213 OFFICE O/P EST LOW 20 MIN: CPT | Performed by: NURSE PRACTITIONER

## 2021-12-09 RX ORDER — PROPRANOLOL HYDROCHLORIDE 20 MG/1
TABLET ORAL
COMMUNITY
Start: 2021-11-16 | End: 2021-12-09 | Stop reason: DRUGHIGH

## 2021-12-09 NOTE — PROGRESS NOTES
"Chief Complaint  burning sensation in feet and discuss xanax    Subjective          Jayden Marquez presents to [facility]  History of Present Illness  C/o burning in BLE, he had below the knee amputation, has burning pain in stump, and also right foot, he noticed several months ago. He does not take medications. He saw neurology 10/28/2021 Dr. Black for symptoms, given gabapenting and increased propanolol to 40mg as needed for tremors. Has f/u scheduled 1/2022, he also had labs.   Also with SHELIA, taking xanax 0.25mg, prev saw psych, Lili Raquel, prev taking propanolol, hydroxyzine, xanax, also taking prozac. He is out of medications. He states he has been out of xanax 3-4 days, he has noticed more tremors and shaking.       Objective   Vital Signs:   /62 (BP Location: Left arm, Patient Position: Sitting, Cuff Size: Adult)   Pulse 52   Temp 98.4 °F (36.9 °C) (Temporal)   Ht 177.2 cm (69.76\")   Wt 66.2 kg (146 lb)   SpO2 96%   BMI 21.09 kg/m²     Physical Exam  Vitals and nursing note reviewed.   Constitutional:       Appearance: He is well-developed.   HENT:      Head: Normocephalic.   Eyes:      Pupils: Pupils are equal, round, and reactive to light.   Cardiovascular:      Rate and Rhythm: Normal rate and regular rhythm.      Pulses: Normal pulses.      Heart sounds: Normal heart sounds.   Pulmonary:      Effort: Pulmonary effort is normal.      Breath sounds: Normal breath sounds.   Skin:     General: Skin is warm and dry.   Neurological:      Mental Status: He is alert and oriented to person, place, and time.      Motor: Tremor present.   Psychiatric:         Attention and Perception: Attention normal.         Mood and Affect: Mood is anxious.         Speech: Speech normal.         Behavior: Behavior normal. Behavior is cooperative.         Thought Content: Thought content normal.         Cognition and Memory: Cognition normal.         Judgment: Judgment normal.      "   Result Review :                 Assessment and Plan    Diagnoses and all orders for this visit:    1. Coarse tremors (Primary)    2. SHELIA (generalized anxiety disorder)    3. Burning sensation of foot        Follow Up   Return if symptoms worsen or fail to improve.  Patient was given instructions and counseling regarding his condition or for health maintenance advice. Please see specific information pulled into the AVS if appropriate.     He will cont with medications prescribed by neurology, he will check with pharmacy today, cont f/u with neurology as scheduled.   List of psych given he will call for appt.   Encouraged counseling, sleep hygiene, meditation, deep breathing, regular exercise.   Patient agrees with plan of care and understands instructions. Call if worsening symptoms or any problems or concerns.

## 2021-12-09 NOTE — PATIENT INSTRUCTIONS
He will cont with medications prescribed by neurology, he will check with pharmacy today, cont f/u with neurology as scheduled.   List of psych given he will call for appt.   Encouraged counseling, sleep hygiene, meditation, deep breathing, regular exercise.   Patient agrees with plan of care and understands instructions. Call if worsening symptoms or any problems or concerns.

## 2021-12-13 DIAGNOSIS — F41.1 GAD (GENERALIZED ANXIETY DISORDER): ICD-10-CM

## 2021-12-13 RX ORDER — ALPRAZOLAM 0.25 MG/1
0.25 TABLET ORAL 3 TIMES DAILY PRN
Qty: 12 TABLET | Refills: 0 | Status: SHIPPED | OUTPATIENT
Start: 2021-12-13 | End: 2022-02-18 | Stop reason: SDUPTHER

## 2021-12-13 NOTE — TELEPHONE ENCOUNTER
Caller: Jayden Marquez    Relationship: Self    Best call back number: [phone]    Requested Prescriptions:   Requested Prescriptions     Pending Prescriptions Disp Refills   • ALPRAZolam (XANAX) 0.25 MG tablet 12 tablet 0     Sig: Take 1 tablet by mouth 3 (Three) Times a Day As Needed for Anxiety. for anxiety        Pharmacy where request should be sent: [id number] - [place], [place] - [address] AT SEC OF [facility] & [facility] [phone] [facility] [phone] FX     Additional details provided by patient: PATIENT IS COMPLETELY OUT OF MEDICATION    Does the patient have less than a 3 day supply:  [x] Yes  [] No    Sloane Coleman Rep   12/13/21 14:45 EST

## 2022-01-21 ENCOUNTER — OFFICE VISIT (OUTPATIENT)
Dept: FAMILY MEDICINE CLINIC | Facility: CLINIC | Age: 82
End: 2022-01-21

## 2022-01-21 VITALS
HEART RATE: 112 BPM | OXYGEN SATURATION: 93 % | HEIGHT: 70 IN | WEIGHT: 145.6 LBS | DIASTOLIC BLOOD PRESSURE: 80 MMHG | BODY MASS INDEX: 20.84 KG/M2 | SYSTOLIC BLOOD PRESSURE: 146 MMHG | TEMPERATURE: 97.8 F

## 2022-01-21 DIAGNOSIS — G62.9 NEUROPATHY: Primary | ICD-10-CM

## 2022-01-21 DIAGNOSIS — E78.2 MIXED HYPERLIPIDEMIA: ICD-10-CM

## 2022-01-21 DIAGNOSIS — E55.9 VITAMIN D DEFICIENCY: ICD-10-CM

## 2022-01-21 PROBLEM — Z89.512 S/P BKA (BELOW KNEE AMPUTATION), LEFT (HCC): Status: ACTIVE | Noted: 2022-01-21
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
## 2022-01-21 PROCEDURE — 99214 OFFICE O/P EST MOD 30 MIN: CPT | Performed by: INTERNAL MEDICINE

## 2022-01-21 RX ORDER — GABAPENTIN 300 MG/1
300 CAPSULE ORAL 3 TIMES DAILY
Qty: 90 CAPSULE | Refills: 3 | Status: SHIPPED | OUTPATIENT
Start: 2022-01-21 | End: 2022-06-29 | Stop reason: SDUPTHER

## 2022-01-21 NOTE — PROGRESS NOTES
Subjective   Jayden Marquez is a 81 y.o. male.     Vitals:    01/21/22 1037   BP: 146/80   Pulse: 112   Temp: 97.8 °F (36.6 °C)   SpO2: 93%      Body mass index is 21.04 kg/m².     History of Present Illness   Patient was seen for neuropathy.  Patient states over the past year he severe burning of his right foot.  Patient's had a BKA on the left.  Patient states it severely burns while walking.  Pulse appears to be good +2/2 dorsalis pedis and posterior tibial.  Patient has no pain to palpation along the plantar surface of the foot.  Patient was advised to get a nerve conduction with EMG and he refuses.  Patient wanted to try gabapentin 300 mg 3 times daily.  Patient was informed this could be very sedating.  Patient will follow-up in 1 month.  Patient's lipids are treated with diet exercise and atorvastatin 40 mg daily.  Glycerides were 150, HDL 32, , this was drawn 6 months ago and he was placed on atorvastatin at that time.  Patient is having labs rechecked but pending at the time of dictation.  Patient does have a vitamin D3 deficiency is supplemented with over-the-counter D3.    Dictated utilizing Dragon dictation. If there are questions or for further clarification, please contact me.  The following portions of the patient's history were reviewed and updated as appropriate: allergies, current medications, past family history, past medical history, past social history, past surgical history and problem list.    Review of Systems   Constitutional: Negative for fatigue and fever.   HENT: Positive for congestion. Negative for trouble swallowing.    Eyes: Negative for discharge and visual disturbance.   Respiratory: Negative for choking and shortness of breath.    Cardiovascular: Negative for chest pain and palpitations.   Gastrointestinal: Negative for abdominal pain and blood in stool.   Endocrine: Negative.    Genitourinary: Negative for genital sores and hematuria.   Musculoskeletal: Negative for gait  problem and joint swelling.        Burning plantar surface of the right foot   Skin: Negative for color change, pallor, rash and wound.   Allergic/Immunologic: Positive for environmental allergies. Negative for immunocompromised state.   Neurological: Negative for facial asymmetry and speech difficulty.   Psychiatric/Behavioral: Negative for hallucinations and suicidal ideas.       Objective   Physical Exam  Vitals and nursing note reviewed.   Constitutional:       Appearance: Normal appearance. He is well-developed.   HENT:      Head: Normocephalic and atraumatic.      Nose: Nose normal.      Mouth/Throat:      Mouth: Mucous membranes are moist.      Pharynx: Oropharynx is clear.   Eyes:      Extraocular Movements: Extraocular movements intact.      Conjunctiva/sclera: Conjunctivae normal.      Pupils: Pupils are equal, round, and reactive to light.   Cardiovascular:      Rate and Rhythm: Normal rate and regular rhythm.      Heart sounds: Normal heart sounds. No murmur heard.  No friction rub. No gallop.    Pulmonary:      Effort: Pulmonary effort is normal. No respiratory distress.      Breath sounds: Normal breath sounds. No stridor. No wheezing, rhonchi or rales.   Chest:      Chest wall: No tenderness.   Abdominal:      General: Bowel sounds are normal.      Palpations: Abdomen is soft.   Musculoskeletal:         General: Normal range of motion.      Cervical back: Normal range of motion and neck supple.   Skin:     General: Skin is warm and dry.   Neurological:      General: No focal deficit present.      Mental Status: He is alert and oriented to person, place, and time. Mental status is at baseline.   Psychiatric:         Mood and Affect: Mood normal.         Behavior: Behavior normal.         Thought Content: Thought content normal.         Judgment: Judgment normal.         Assessment/Plan #1 gabapentin 3 mg p.o. 3 times daily patient was informed this was very sedating #2 labs 3 strongly recommend nerve  conduction with EMG  Problems Addressed this Visit        Cardiac and Vasculature    Mixed hyperlipidemia    Relevant Orders    CBC (No Diff)    Comprehensive Metabolic Panel    Lipid Panel    Vitamin D 25 Hydroxy    Vitamin B12 & Folate    Thyroid Panel With TSH      Other Visit Diagnoses     Neuropathy    -  Primary    Relevant Medications    gabapentin (NEURONTIN) 300 MG capsule    Other Relevant Orders    CBC (No Diff)    Comprehensive Metabolic Panel    Lipid Panel    Vitamin D 25 Hydroxy    Vitamin B12 & Folate    Thyroid Panel With TSH    Vitamin D deficiency        Relevant Orders    CBC (No Diff)    Comprehensive Metabolic Panel    Lipid Panel    Vitamin D 25 Hydroxy    Vitamin B12 & Folate    Thyroid Panel With TSH      Diagnoses     Diagnosis Codes Comments    Neuropathy    -  Primary ICD-10-CM: G62.9  ICD-9-CM: 355.9     Mixed hyperlipidemia     ICD-10-CM: E78.2  ICD-9-CM: 272.2     Vitamin D deficiency     ICD-10-CM: E55.9  ICD-9-CM: 268.9

## 2022-01-25 ENCOUNTER — LAB (OUTPATIENT)
Dept: FAMILY MEDICINE CLINIC | Facility: CLINIC | Age: 82
End: 2022-01-25

## 2022-01-25 DIAGNOSIS — E78.2 MIXED HYPERLIPIDEMIA: ICD-10-CM

## 2022-01-25 DIAGNOSIS — E55.9 VITAMIN D DEFICIENCY: ICD-10-CM

## 2022-01-25 DIAGNOSIS — G62.9 NEUROPATHY: ICD-10-CM

## 2022-01-25 LAB
25(OH)D3 SERPL-MCNC: 12.4 NG/ML (ref 30–100)
ALBUMIN SERPL-MCNC: 4.1 G/DL (ref 3.5–5.2)
ALBUMIN/GLOB SERPL: 0.9 G/DL
ALP SERPL-CCNC: 120 U/L (ref 39–117)
ALT SERPL W P-5'-P-CCNC: 12 U/L (ref 1–41)
ANION GAP SERPL CALCULATED.3IONS-SCNC: 9 MMOL/L (ref 5–15)
AST SERPL-CCNC: 16 U/L (ref 1–40)
BILIRUB SERPL-MCNC: 0.3 MG/DL (ref 0–1.2)
BUN SERPL-MCNC: 13 MG/DL (ref 8–23)
BUN/CREAT SERPL: 12.7 (ref 7–25)
CALCIUM SPEC-SCNC: 9.5 MG/DL (ref 8.6–10.5)
CHLORIDE SERPL-SCNC: 104 MMOL/L (ref 98–107)
CHOLEST SERPL-MCNC: 284 MG/DL (ref 0–200)
CO2 SERPL-SCNC: 26 MMOL/L (ref 22–29)
CREAT SERPL-MCNC: 1.02 MG/DL (ref 0.76–1.27)
DEPRECATED RDW RBC AUTO: 46.2 FL (ref 37–54)
ERYTHROCYTE [DISTWIDTH] IN BLOOD BY AUTOMATED COUNT: 13.4 % (ref 12.3–15.4)
FOLATE SERPL-MCNC: 4.95 NG/ML (ref 4.78–24.2)
GFR SERPL CREATININE-BSD FRML MDRD: 70 ML/MIN/1.73
GLOBULIN UR ELPH-MCNC: 4.6 GM/DL
GLUCOSE SERPL-MCNC: 89 MG/DL (ref 65–99)
HCT VFR BLD AUTO: 41.7 % (ref 37.5–51)
HDLC SERPL-MCNC: 36 MG/DL (ref 40–60)
HGB BLD-MCNC: 13.5 G/DL (ref 13–17.7)
LDLC SERPL CALC-MCNC: 220 MG/DL (ref 0–100)
LDLC/HDLC SERPL: 6.07 {RATIO}
MCH RBC QN AUTO: 30.5 PG (ref 26.6–33)
MCHC RBC AUTO-ENTMCNC: 32.4 G/DL (ref 31.5–35.7)
MCV RBC AUTO: 94.1 FL (ref 79–97)
PLATELET # BLD AUTO: 225 10*3/MM3 (ref 140–450)
PMV BLD AUTO: 10.2 FL (ref 6–12)
POTASSIUM SERPL-SCNC: 5.5 MMOL/L (ref 3.5–5.2)
PROT SERPL-MCNC: 8.7 G/DL (ref 6–8.5)
RBC # BLD AUTO: 4.43 10*6/MM3 (ref 4.14–5.8)
SODIUM SERPL-SCNC: 139 MMOL/L (ref 136–145)
T-UPTAKE NFR SERPL: 1.07 TBI (ref 0.8–1.3)
T4 SERPL-MCNC: 5.77 MCG/DL (ref 4.5–11.7)
TRIGL SERPL-MCNC: 148 MG/DL (ref 0–150)
TSH SERPL DL<=0.05 MIU/L-ACNC: 2.97 UIU/ML (ref 0.27–4.2)
VIT B12 BLD-MCNC: 395 PG/ML (ref 211–946)
VLDLC SERPL-MCNC: 28 MG/DL (ref 5–40)
WBC NRBC COR # BLD: 6.41 10*3/MM3 (ref 3.4–10.8)

## 2022-01-25 PROCEDURE — 84443 ASSAY THYROID STIM HORMONE: CPT | Performed by: INTERNAL MEDICINE

## 2022-01-25 PROCEDURE — 85027 COMPLETE CBC AUTOMATED: CPT | Performed by: INTERNAL MEDICINE

## 2022-01-25 PROCEDURE — 82306 VITAMIN D 25 HYDROXY: CPT | Performed by: INTERNAL MEDICINE

## 2022-01-25 PROCEDURE — 84479 ASSAY OF THYROID (T3 OR T4): CPT | Performed by: INTERNAL MEDICINE

## 2022-01-25 PROCEDURE — 84436 ASSAY OF TOTAL THYROXINE: CPT | Performed by: INTERNAL MEDICINE

## 2022-01-25 PROCEDURE — 82607 VITAMIN B-12: CPT | Performed by: INTERNAL MEDICINE

## 2022-01-25 PROCEDURE — 80053 COMPREHEN METABOLIC PANEL: CPT | Performed by: INTERNAL MEDICINE

## 2022-01-25 PROCEDURE — 36415 COLL VENOUS BLD VENIPUNCTURE: CPT | Performed by: INTERNAL MEDICINE

## 2022-01-25 PROCEDURE — 82746 ASSAY OF FOLIC ACID SERUM: CPT | Performed by: INTERNAL MEDICINE

## 2022-01-25 PROCEDURE — 80061 LIPID PANEL: CPT | Performed by: INTERNAL MEDICINE

## 2022-01-28 RX ORDER — ERGOCALCIFEROL 1.25 MG/1
50000 CAPSULE ORAL WEEKLY
Qty: 16 CAPSULE | Refills: 3 | Status: SHIPPED | OUTPATIENT
Start: 2022-01-28 | End: 2022-12-30 | Stop reason: HOSPADM

## 2022-01-31 ENCOUNTER — TELEPHONE (OUTPATIENT)
Dept: NEUROLOGY | Facility: CLINIC | Age: 82
End: 2022-01-31

## 2022-01-31 ENCOUNTER — TELEPHONE (OUTPATIENT)
Dept: FAMILY MEDICINE CLINIC | Facility: CLINIC | Age: 82
End: 2022-01-31

## 2022-01-31 NOTE — TELEPHONE ENCOUNTER
Patient was a N/S today and did not even know why he even came . Patient seamed a little confused and stated he maybe thought he did not need it ,Angelique Hamlin

## 2022-02-10 ENCOUNTER — LAB (OUTPATIENT)
Dept: FAMILY MEDICINE CLINIC | Facility: CLINIC | Age: 82
End: 2022-02-10

## 2022-02-10 DIAGNOSIS — R77.9 ELEVATED BLOOD PROTEIN: Primary | ICD-10-CM

## 2022-02-10 PROCEDURE — 36415 COLL VENOUS BLD VENIPUNCTURE: CPT | Performed by: INTERNAL MEDICINE

## 2022-02-11 LAB
ALBUMIN SERPL ELPH-MCNC: 3.4 G/DL (ref 2.9–4.4)
ALBUMIN/GLOB SERPL: 0.7 {RATIO} (ref 0.7–1.7)
ALPHA1 GLOB SERPL ELPH-MCNC: 0.3 G/DL (ref 0–0.4)
ALPHA2 GLOB SERPL ELPH-MCNC: 1 G/DL (ref 0.4–1)
B-GLOBULIN SERPL ELPH-MCNC: 1.8 G/DL (ref 0.7–1.3)
GAMMA GLOB SERPL ELPH-MCNC: 1.7 G/DL (ref 0.4–1.8)
GLOBULIN SER CALC-MCNC: 4.7 G/DL (ref 2.2–3.9)
LABORATORY COMMENT REPORT: ABNORMAL
M PROTEIN SERPL ELPH-MCNC: 0.9 G/DL
PROT PATTERN SERPL ELPH-IMP: ABNORMAL
PROT SERPL-MCNC: 8.1 G/DL (ref 6–8.5)

## 2022-02-18 ENCOUNTER — OFFICE VISIT (OUTPATIENT)
Dept: FAMILY MEDICINE CLINIC | Facility: CLINIC | Age: 82
End: 2022-02-18

## 2022-02-18 VITALS
HEART RATE: 63 BPM | DIASTOLIC BLOOD PRESSURE: 60 MMHG | TEMPERATURE: 97.7 F | OXYGEN SATURATION: 98 % | SYSTOLIC BLOOD PRESSURE: 130 MMHG | RESPIRATION RATE: 16 BRPM | HEIGHT: 70 IN | BODY MASS INDEX: 21.73 KG/M2 | WEIGHT: 151.8 LBS

## 2022-02-18 DIAGNOSIS — F41.1 GAD (GENERALIZED ANXIETY DISORDER): ICD-10-CM

## 2022-02-18 DIAGNOSIS — D47.2 MGUS (MONOCLONAL GAMMOPATHY OF UNKNOWN SIGNIFICANCE): Primary | ICD-10-CM

## 2022-02-18 PROCEDURE — 99213 OFFICE O/P EST LOW 20 MIN: CPT | Performed by: INTERNAL MEDICINE

## 2022-02-18 RX ORDER — ATORVASTATIN CALCIUM 40 MG/1
40 TABLET, FILM COATED ORAL NIGHTLY
Qty: 90 TABLET | Refills: 1 | Status: SHIPPED | OUTPATIENT
Start: 2022-02-18 | End: 2022-10-18

## 2022-02-18 RX ORDER — FLUOXETINE HYDROCHLORIDE 40 MG/1
40 CAPSULE ORAL DAILY
Qty: 90 CAPSULE | Refills: 3 | Status: SHIPPED | OUTPATIENT
Start: 2022-02-18 | End: 2023-02-10

## 2022-02-18 RX ORDER — ALPRAZOLAM 0.25 MG/1
0.25 TABLET ORAL 3 TIMES DAILY PRN
Qty: 90 TABLET | Refills: 3 | Status: SHIPPED | OUTPATIENT
Start: 2022-02-18 | End: 2022-08-24

## 2022-02-18 NOTE — PROGRESS NOTES
Subjective   Jayden Marquez is a 81 y.o. male.     Vitals:    02/18/22 1032   BP: 130/60   Pulse: 63   Resp: 16   Temp: 97.7 °F (36.5 °C)   SpO2: 98%      Body mass index is 21.93 kg/m².     History of Present Illness   Patient was seen for generalized anxiety disorder.  Patient is stable on fluoxetine 40 mg daily with alprazolam 0.25 mg 3 times daily.  Patient can maintain his activities of daily living with these medications.    The patient has read and signed the [facility] Controlled Substance Contract.  I will continue to see patient for regular follow up appointments.  They are well controlled on their medication.  ABAD has been reviewed by me and is updated every 3 months. The patient is aware of the potential for addiction and dependence.      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  Patient did not want us to schedule but states he will schedule his appointment himself.  Patient was informed this could turn into multiple myeloma could be dangerous.    Dictated utilizing Dragon dictation. If there are questions or for further clarification, please contact me.  The following portions of the patient's history were reviewed and updated as appropriate: allergies, current medications, past family history, past medical history, past social history, past surgical history and problem list.    Review of Systems   Constitutional: Negative for fatigue and fever.   HENT: Positive for congestion. Negative for trouble swallowing.    Eyes: Negative for discharge and visual disturbance.   Respiratory: Negative for choking and shortness of breath.    Cardiovascular: Negative for chest pain and palpitations.   Gastrointestinal: Negative for abdominal pain and blood in stool.   Endocrine: Negative.    Genitourinary: Negative for genital sores and hematuria.   Musculoskeletal: Negative for gait problem and joint swelling.   Skin: Negative for color change, pallor, rash and  wound.   Allergic/Immunologic: Positive for environmental allergies. Negative for immunocompromised state.   Neurological: Negative for facial asymmetry and speech difficulty.   Psychiatric/Behavioral: Negative for hallucinations and suicidal ideas. The patient is nervous/anxious.        Objective   Physical Exam  Vitals and nursing note reviewed.   Constitutional:       Appearance: Normal appearance. He is well-developed.   HENT:      Head: Normocephalic and atraumatic.      Nose: Nose normal.      Mouth/Throat:      Mouth: Mucous membranes are moist.      Pharynx: Oropharynx is clear.   Eyes:      Extraocular Movements: Extraocular movements intact.      Conjunctiva/sclera: Conjunctivae normal.      Pupils: Pupils are equal, round, and reactive to light.   Cardiovascular:      Rate and Rhythm: Normal rate and regular rhythm.      Heart sounds: Normal heart sounds. No murmur heard.  No friction rub. No gallop.    Pulmonary:      Effort: Pulmonary effort is normal. No respiratory distress.      Breath sounds: Normal breath sounds. No stridor. No wheezing, rhonchi or rales.   Chest:      Chest wall: No tenderness.   Abdominal:      General: Bowel sounds are normal.      Palpations: Abdomen is soft.   Musculoskeletal:         General: Normal range of motion.      Cervical back: Normal range of motion and neck supple.   Skin:     General: Skin is warm and dry.   Neurological:      General: No focal deficit present.      Mental Status: He is alert and oriented to person, place, and time. Mental status is at baseline.   Psychiatric:         Behavior: Behavior normal.         Thought Content: Thought content normal.         Judgment: Judgment normal.      Comments: Patient has moderate general anxiety disorder         Assessment/Plan #1 continue present medication #2 refer to hematologist for yearly visit  Problems Addressed this Visit        Hematology and Neoplasia    MGUS (monoclonal gammopathy of unknown significance)  - Primary      Other Visit Diagnoses     SHELIA (generalized anxiety disorder)        Relevant Medications    FLUoxetine (PROzac) 40 MG capsule    ALPRAZolam (XANAX) 0.25 MG tablet      Diagnoses     Diagnosis Codes Comments    MGUS (monoclonal gammopathy of unknown significance)    -  Primary ICD-10-CM: D47.2  ICD-9-CM: 273.1     SHELIA (generalized anxiety disorder)     ICD-10-CM: F41.1  ICD-9-CM: 300.02

## 2022-05-18 RX ORDER — PROPRANOLOL HYDROCHLORIDE 20 MG/1
TABLET ORAL
Qty: 180 TABLET | Refills: 1 | Status: SHIPPED | OUTPATIENT
Start: 2022-05-18 | End: 2022-12-13

## 2022-05-19 ENCOUNTER — TELEPHONE (OUTPATIENT)
Dept: FAMILY MEDICINE CLINIC | Facility: CLINIC | Age: 82
End: 2022-05-19

## 2022-05-19 DIAGNOSIS — Z89.512 S/P BKA (BELOW KNEE AMPUTATION), LEFT: Primary | ICD-10-CM

## 2022-05-19 NOTE — TELEPHONE ENCOUNTER
NEEDING A DETAILED WRITTEN ORDER FOR SOCKET INSERT WITH LOCKING MECHANISM X2, ORDER NUMBER  X2    FAX NUMBER [phone]

## 2022-05-24 ENCOUNTER — TELEPHONE (OUTPATIENT)
Dept: FAMILY MEDICINE CLINIC | Facility: CLINIC | Age: 82
End: 2022-05-24

## 2022-05-24 NOTE — TELEPHONE ENCOUNTER
Caller: LOUIS- NURSE FROM [facility] call back number:[phone]    Who are you requesting to speak with (clinical staff, provider,  specific staff member): CLINICAL STAFF     What was the call regarding: CALLING WITH APPROVAL FOR  x2     DATES OF SERVICE 5/24/22-7/22/22  AUTHORIZATION # [id number]

## 2022-06-29 ENCOUNTER — OFFICE VISIT (OUTPATIENT)
Dept: FAMILY MEDICINE CLINIC | Facility: CLINIC | Age: 82
End: 2022-06-29

## 2022-06-29 VITALS
BODY MASS INDEX: 21.7 KG/M2 | HEIGHT: 70 IN | DIASTOLIC BLOOD PRESSURE: 60 MMHG | SYSTOLIC BLOOD PRESSURE: 128 MMHG | WEIGHT: 151.6 LBS | OXYGEN SATURATION: 99 % | HEART RATE: 60 BPM | TEMPERATURE: 97.5 F

## 2022-06-29 DIAGNOSIS — Z00.00 MEDICARE ANNUAL WELLNESS VISIT, SUBSEQUENT: Primary | ICD-10-CM

## 2022-06-29 DIAGNOSIS — G25.2 COARSE TREMORS: ICD-10-CM

## 2022-06-29 DIAGNOSIS — K51.511 LEFT SIDED COLITIS WITH RECTAL BLEEDING: ICD-10-CM

## 2022-06-29 DIAGNOSIS — E55.9 VITAMIN D DEFICIENCY: ICD-10-CM

## 2022-06-29 DIAGNOSIS — G62.9 NEUROPATHY: ICD-10-CM

## 2022-06-29 DIAGNOSIS — K62.89 RECTAL PAIN: ICD-10-CM

## 2022-06-29 PROCEDURE — 1170F FXNL STATUS ASSESSED: CPT | Performed by: INTERNAL MEDICINE

## 2022-06-29 PROCEDURE — 1159F MED LIST DOCD IN RCRD: CPT | Performed by: INTERNAL MEDICINE

## 2022-06-29 PROCEDURE — G0439 PPPS, SUBSEQ VISIT: HCPCS | Performed by: INTERNAL MEDICINE

## 2022-06-29 PROCEDURE — 96160 PT-FOCUSED HLTH RISK ASSMT: CPT | Performed by: INTERNAL MEDICINE

## 2022-06-29 PROCEDURE — 99214 OFFICE O/P EST MOD 30 MIN: CPT | Performed by: INTERNAL MEDICINE

## 2022-06-29 RX ORDER — GABAPENTIN 300 MG/1
300 CAPSULE ORAL 3 TIMES DAILY
Qty: 90 CAPSULE | Refills: 3 | Status: SHIPPED | OUTPATIENT
Start: 2022-06-29 | End: 2022-12-30

## 2022-06-29 RX ORDER — CLOTRIMAZOLE AND BETAMETHASONE DIPROPIONATE 10; .64 MG/G; MG/G
1 CREAM TOPICAL 2 TIMES DAILY
Qty: 15 G | Refills: 2 | Status: SHIPPED | OUTPATIENT
Start: 2022-06-29 | End: 2022-12-30
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
## 2022-07-01 ENCOUNTER — OFFICE VISIT (OUTPATIENT)
Dept: SURGERY | Facility: CLINIC | Age: 82
End: 2022-07-01

## 2022-07-01 ENCOUNTER — OFFICE VISIT (OUTPATIENT)
Dept: GASTROENTEROLOGY | Facility: CLINIC | Age: 82
End: 2022-07-01

## 2022-07-01 ENCOUNTER — TELEPHONE (OUTPATIENT)
Dept: SURGERY | Facility: CLINIC | Age: 82
End: 2022-07-01

## 2022-07-01 VITALS
OXYGEN SATURATION: 89 % | SYSTOLIC BLOOD PRESSURE: 110 MMHG | BODY MASS INDEX: 21.5 KG/M2 | HEIGHT: 70 IN | DIASTOLIC BLOOD PRESSURE: 63 MMHG | HEART RATE: 65 BPM | TEMPERATURE: 97.3 F | WEIGHT: 150.2 LBS

## 2022-07-01 VITALS — OXYGEN SATURATION: 97 % | WEIGHT: 148.3 LBS | BODY MASS INDEX: 21.43 KG/M2 | HEART RATE: 68 BPM

## 2022-07-01 DIAGNOSIS — K51.818 OTHER ULCERATIVE COLITIS WITH OTHER COMPLICATION: ICD-10-CM

## 2022-07-01 DIAGNOSIS — K62.89 ANAL OR RECTAL PAIN: Primary | ICD-10-CM

## 2022-07-01 DIAGNOSIS — Z51.81 THERAPEUTIC DRUG MONITORING: ICD-10-CM

## 2022-07-01 DIAGNOSIS — K51.918 ULCERATIVE COLITIS WITH OTHER COMPLICATION, UNSPECIFIED LOCATION: Primary | ICD-10-CM

## 2022-07-01 PROCEDURE — 46600 DIAGNOSTIC ANOSCOPY SPX: CPT | Performed by: PHYSICIAN ASSISTANT

## 2022-07-01 PROCEDURE — 99214 OFFICE O/P EST MOD 30 MIN: CPT | Performed by: NURSE PRACTITIONER

## 2022-07-01 PROCEDURE — 99204 OFFICE O/P NEW MOD 45 MIN: CPT | Performed by: PHYSICIAN ASSISTANT

## 2022-07-01 RX ORDER — LEVOFLOXACIN 750 MG/1
750 TABLET ORAL DAILY
Qty: 5 TABLET | Refills: 0 | Status: SHIPPED | OUTPATIENT
Start: 2022-07-01 | End: 2022-07-06

## 2022-07-01 RX ORDER — METRONIDAZOLE 500 MG/1
500 TABLET ORAL 2 TIMES DAILY
Qty: 10 TABLET | Refills: 0 | Status: SHIPPED | OUTPATIENT
Start: 2022-07-01 | End: 2022-07-06

## 2022-07-01 RX ORDER — MESALAMINE 1000 MG/1
1000 SUPPOSITORY RECTAL NIGHTLY
Qty: 30 SUPPOSITORY | Refills: 0 | Status: SHIPPED | OUTPATIENT
Start: 2022-07-01 | End: 2022-07-31
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
## 2022-08-24 DIAGNOSIS — F41.1 GAD (GENERALIZED ANXIETY DISORDER): ICD-10-CM

## 2022-08-24 RX ORDER — ALPRAZOLAM 0.25 MG/1
TABLET ORAL
Qty: 90 TABLET | Refills: 4 | Status: SHIPPED | OUTPATIENT
Start: 2022-08-24 | End: 2023-03-20

## 2022-10-18 ENCOUNTER — PATIENT OUTREACH (OUTPATIENT)
Dept: PHARMACY | Facility: HOSPITAL | Age: 82
End: 2022-10-18

## 2022-10-18 RX ORDER — ATORVASTATIN CALCIUM 40 MG/1
40 TABLET, FILM COATED ORAL NIGHTLY
Qty: 90 TABLET | Refills: 1 | Status: SHIPPED | OUTPATIENT
Start: 2022-10-18

## 2022-10-18 NOTE — OUTREACH NOTE
Medication Adherence Call    Patient was called today to discuss medication adherence, as he was identified as having care opportunities.    he denies issues with adherence and denies any barriers to receiving medications.    He is compliant with the statin he takes (atorvastatin) and has had a recent office visit.    He did not currently have a refill on his prescription so the pharmacy was contacted to submit for refill.    Do not see any reason it will not filled timely. Mr Marquez stated he will  on 10/19/22    Crista Aparicio, MagdaD  Population Health Pharmacist  10/18/22

## 2022-12-13 RX ORDER — PROPRANOLOL HYDROCHLORIDE 20 MG/1
TABLET ORAL
Qty: 180 TABLET | Refills: 1 | Status: SHIPPED | OUTPATIENT
Start: 2022-12-13

## 2022-12-30 ENCOUNTER — OFFICE VISIT (OUTPATIENT)
Dept: FAMILY MEDICINE CLINIC | Facility: CLINIC | Age: 82
End: 2022-12-30

## 2022-12-30 VITALS
BODY MASS INDEX: 22.31 KG/M2 | HEIGHT: 69 IN | TEMPERATURE: 98.2 F | DIASTOLIC BLOOD PRESSURE: 66 MMHG | SYSTOLIC BLOOD PRESSURE: 126 MMHG | WEIGHT: 150.6 LBS | OXYGEN SATURATION: 96 % | HEART RATE: 64 BPM

## 2022-12-30 DIAGNOSIS — E78.2 MIXED HYPERLIPIDEMIA: Primary | ICD-10-CM

## 2022-12-30 DIAGNOSIS — G25.2 COARSE TREMORS: ICD-10-CM

## 2022-12-30 DIAGNOSIS — F41.1 GAD (GENERALIZED ANXIETY DISORDER): ICD-10-CM

## 2022-12-30 PROCEDURE — 99214 OFFICE O/P EST MOD 30 MIN: CPT | Performed by: INTERNAL MEDICINE

## 2022-12-30 NOTE — PROGRESS NOTES
"Chief Complaint  6 month f/u and needs labs    Subjective        Jayden Marquez presents to [facility]  History of Present Illness patient was seen for hyperlipidemia.  Patient is using atorvastatin 40 mg daily.  Triglycerides 148, HDL 36, .  Patient was placed on atorvastatin 40 mg daily.  Labs were drawn 6 months ago and is having it repeated today.  He was placed on atorvastatin at that time.  Patient does have generalized anxiety disorder and is using alprazolam 0.25 mg 3 times daily as needed.  Patient's anxiety is being controlled with this medication.  Patient does have a history of tremors and is using propranolol 20 mg 1/2 tablet twice daily.  Patient's symptoms are controlled with this treatment.  Patient will have labs today and follow-up.    Patient does have difficulty walking and has fallen multiple times.  Patient does have a walker at home but will not use it.  Patient was strongly advised to be keeps only could break a hip.  Patient was advised if he broke his hip he would be immobile.  Patient was advised to go to physical therapy also.  Patient was strongly advised to use his walker continuously.    Objective   Vital Signs:  Blood Pressure 126/66   Pulse 64   Temperature 98.2 °F (36.8 °C)   Height 175.3 cm (69\")   Weight 68.3 kg (150 lb 9.6 oz)   Oxygen Saturation 96%   Body Mass Index 22.24 kg/m²   Estimated body mass index is 22.24 kg/m² as calculated from the following:    Height as of this encounter: 175.3 cm (69\").    Weight as of this encounter: 68.3 kg (150 lb 9.6 oz).    BMI is within normal parameters. No other follow-up for BMI required.      Physical Exam  Vitals and nursing note reviewed.   Constitutional:       Appearance: Normal appearance. He is well-developed.   HENT:      Head: Normocephalic and atraumatic.      Nose: Nose normal.      Mouth/Throat:      Mouth: Mucous membranes are moist.      Pharynx: Oropharynx is clear.   Eyes:      " Extraocular Movements: Extraocular movements intact.      Conjunctiva/sclera: Conjunctivae normal.      Pupils: Pupils are equal, round, and reactive to light.   Cardiovascular:      Rate and Rhythm: Normal rate and regular rhythm.      Heart sounds: Normal heart sounds. No murmur heard.    No friction rub. No gallop.   Pulmonary:      Effort: Pulmonary effort is normal. No respiratory distress.      Breath sounds: Normal breath sounds. No stridor. No wheezing, rhonchi or rales.   Chest:      Chest wall: No tenderness.   Abdominal:      General: Bowel sounds are normal.      Palpations: Abdomen is soft.   Musculoskeletal:         General: Normal range of motion.      Cervical back: Normal range of motion and neck supple.   Skin:     General: Skin is warm and dry.   Neurological:      General: No focal deficit present.      Mental Status: He is alert and oriented to person, place, and time. Mental status is at baseline.      Motor: Weakness present.      Coordination: Coordination abnormal.      Gait: Gait abnormal.   Psychiatric:         Mood and Affect: Mood normal.         Behavior: Behavior normal.         Thought Content: Thought content normal.         Judgment: Judgment normal.        Result Review :                Assessment and Plan  #1 labs #2 continue present treatment #2 use walker continuously, recommend physical therapy  Diagnoses and all orders for this visit:    1. Mixed hyperlipidemia (Primary)  -     CBC (No Diff)  -     Comprehensive Metabolic Panel  -     Lipid Panel  -     Vitamin D,25-Hydroxy    2. SHELIA (generalized anxiety disorder)  -     CBC (No Diff)  -     Comprehensive Metabolic Panel  -     Lipid Panel  -     Vitamin D,25-Hydroxy    3. Coarse tremors             Follow Up   Return in about 6 months (around 6/30/2023), or if symptoms worsen or fail to improve.  Patient was given instructions and counseling regarding his condition or for health maintenance advice. Please see specific  information pulled into the AVS if appropriate.

## 2023-02-10 RX ORDER — FLUOXETINE HYDROCHLORIDE 40 MG/1
40 CAPSULE ORAL DAILY
Qty: 90 CAPSULE | Refills: 3 | Status: SHIPPED | OUTPATIENT
Start: 2023-02-10

## 2023-03-20 DIAGNOSIS — F41.1 GAD (GENERALIZED ANXIETY DISORDER): ICD-10-CM

## 2023-03-20 RX ORDER — ALPRAZOLAM 0.25 MG/1
TABLET ORAL
Qty: 90 TABLET | Refills: 4 | Status: SHIPPED | OUTPATIENT
Start: 2023-03-20

## 2023-04-10 ENCOUNTER — OFFICE VISIT (OUTPATIENT)
Dept: FAMILY MEDICINE CLINIC | Facility: CLINIC | Age: 83
End: 2023-04-10
Payer: MEDICARE

## 2023-04-10 VITALS
SYSTOLIC BLOOD PRESSURE: 110 MMHG | WEIGHT: 148.2 LBS | HEART RATE: 75 BPM | BODY MASS INDEX: 21.95 KG/M2 | TEMPERATURE: 98.4 F | DIASTOLIC BLOOD PRESSURE: 70 MMHG | HEIGHT: 69 IN | OXYGEN SATURATION: 93 %

## 2023-04-10 DIAGNOSIS — Z00.00 ENCOUNTER FOR MEDICAL EXAMINATION TO ESTABLISH CARE: ICD-10-CM

## 2023-04-10 DIAGNOSIS — E78.2 MIXED HYPERLIPIDEMIA: ICD-10-CM

## 2023-04-10 DIAGNOSIS — J30.2 SEASONAL ALLERGIES: ICD-10-CM

## 2023-04-10 DIAGNOSIS — Z91.89 COMPLIANCE WITH MEDICATION REGIMEN: ICD-10-CM

## 2023-04-10 DIAGNOSIS — R41.3 MEMORY CHANGE: ICD-10-CM

## 2023-04-10 DIAGNOSIS — F41.9 ANXIETY: ICD-10-CM

## 2023-04-10 DIAGNOSIS — R68.89 FORGETFULNESS: ICD-10-CM

## 2023-04-10 DIAGNOSIS — G25.2 COARSE TREMORS: Primary | ICD-10-CM

## 2023-04-10 PROBLEM — F55.8: Status: ACTIVE | Noted: 2023-04-10

## 2023-04-10 NOTE — PROGRESS NOTES
"Chief Complaint  Establish Care (Raquel patient), Hyperlipidemia, Anxiety (Generalized anxiety disorder), and coarse tremors    Subjective        Jayden Marquez presents to [facility]  History of Present Illness  Patient presents office today to establish care.  Patient is a previous patient of Dr. García.  Patient has hyperlipidemia, generalized anxiety disorder and coarse tremors.  Patient has additional complaints today of increased forgetfulness.  He denies headache and/or dizziness.  Patient has been treated with Prozac and Xanax for anxiety.  He is taking propanolol for the coarse tremors.  Patient is currently being prescribed 90 tablets/month for SHELIA.  I have discussed with patient that and I can only prescribe 30 to 45 tablets/month.  Patient agrees with recommendations.    MRI brain   forgetvullness       Objective   Vital Signs:  /70 (BP Location: Left arm, Patient Position: Sitting, Cuff Size: Adult)   Pulse 75   Temp 98.4 °F (36.9 °C)   Ht 175.3 cm (69\")   Wt 67.2 kg (148 lb 3.2 oz)   SpO2 93%   BMI 21.89 kg/m²   Estimated body mass index is 21.89 kg/m² as calculated from the following:    Height as of this encounter: 175.3 cm (69\").    Weight as of this encounter: 67.2 kg (148 lb 3.2 oz).       BMI is within normal parameters. No other follow-up for BMI required.      Physical Exam  Constitutional:       General: He is not in acute distress.     Appearance: Normal appearance.   HENT:      Head: Normocephalic.   Eyes:      Pupils: Pupils are equal, round, and reactive to light.   Cardiovascular:      Rate and Rhythm: Normal rate.      Pulses: Normal pulses.      Heart sounds: Normal heart sounds.   Pulmonary:      Effort: Pulmonary effort is normal.      Breath sounds: Normal breath sounds.   Musculoskeletal:         General: Normal range of motion.      Cervical back: Normal range of motion and neck supple.   Skin:     General: Skin is warm.   Neurological:    "   General: No focal deficit present.      Mental Status: He is alert and oriented to person, place, and time. Mental status is at baseline.      Cranial Nerves: No cranial nerve deficit.      Sensory: No sensory deficit.      Motor: No weakness.      Coordination: Coordination normal.      Gait: Gait normal.      Deep Tendon Reflexes: Reflexes normal.      Comments: Tremors bilateral hands    Psychiatric:         Mood and Affect: Mood normal.         Behavior: Behavior normal.         Thought Content: Thought content normal.         Judgment: Judgment normal.        Result Review :  The following data was reviewed by: RAYMOND Vaughn on 04/10/2023:               Assessment and Plan   Diagnoses and all orders for this visit:    1. Coarse tremors (Primary)  -     CBC & Differential  -     Comprehensive Metabolic Panel  -     Lipid Panel  -     TSH  -     MRI Brain Without Contrast; Future    2. Compliance with medication regimen  -     Compliance Drug Analysis, Ur - Urine, Clean Catch    3. Mixed hyperlipidemia  -     CBC & Differential  -     Comprehensive Metabolic Panel  -     Lipid Panel    4. Anxiety  -     TSH  -     Compliance Drug Analysis, Ur - Urine, Clean Catch    5. Seasonal allergies  -     CBC & Differential    6. Forgetfulness  -     MRI Brain Without Contrast; Future    7. Memory change    8. Encounter for medical examination to establish care    Discussed and educated with patient taking a controlled substance.  Denies any side effects from medication including sedation and/or constipation.  Medications are not interfering with ADLs.  Denies craving, compulsive behavior, lack of self-control.  Denies negative consequences as a result of using controlled substances.  Using lowest effective dose at this time.  Discussed alternative pharmacological and nonpharmacological pain relief therapies.  No suspected aberrant drug taking behaviors.         I spent 30 minutes caring for Jayden on this date  of service. This time includes time spent by me in the following activities:preparing for the visit, reviewing tests, obtaining and/or reviewing a separately obtained history, performing a medically appropriate examination and/or evaluation , counseling and educating the patient/family/caregiver, ordering medications, tests, or procedures, referring and communicating with other health care professionals , documenting information in the medical record, independently interpreting results and communicating that information with the patient/family/caregiver and care coordination  Follow Up   No follow-ups on file.  Patient was given instructions and counseling regarding his condition or for health maintenance advice. Please see specific information pulled into the AVS if appropriate.

## 2023-04-11 LAB
ALBUMIN SERPL-MCNC: 4.1 G/DL (ref 3.7–4.7)
ALBUMIN/GLOB SERPL: 1.1 {RATIO} (ref 1.2–2.2)
ALP SERPL-CCNC: 131 IU/L (ref 44–121)
ALT SERPL-CCNC: 21 IU/L (ref 0–44)
AST SERPL-CCNC: 19 IU/L (ref 0–40)
BASOPHILS # BLD AUTO: 0.1 X10E3/UL (ref 0–0.2)
BASOPHILS NFR BLD AUTO: 1 %
BILIRUB SERPL-MCNC: 0.4 MG/DL (ref 0–1.2)
BUN SERPL-MCNC: 11 MG/DL (ref 8–27)
BUN/CREAT SERPL: 13 (ref 10–24)
CALCIUM SERPL-MCNC: 9.6 MG/DL (ref 8.6–10.2)
CHLORIDE SERPL-SCNC: 104 MMOL/L (ref 96–106)
CHOLEST SERPL-MCNC: 168 MG/DL (ref 100–199)
CO2 SERPL-SCNC: 25 MMOL/L (ref 20–29)
CREAT SERPL-MCNC: 0.84 MG/DL (ref 0.76–1.27)
EGFRCR SERPLBLD CKD-EPI 2021: 88 ML/MIN/1.73
EOSINOPHIL # BLD AUTO: 0.2 X10E3/UL (ref 0–0.4)
EOSINOPHIL NFR BLD AUTO: 2 %
ERYTHROCYTE [DISTWIDTH] IN BLOOD BY AUTOMATED COUNT: 13.3 % (ref 11.6–15.4)
GLOBULIN SER CALC-MCNC: 3.9 G/DL (ref 1.5–4.5)
GLUCOSE SERPL-MCNC: 105 MG/DL (ref 70–99)
HCT VFR BLD AUTO: 40.9 % (ref 37.5–51)
HDLC SERPL-MCNC: 35 MG/DL
HGB BLD-MCNC: 13.6 G/DL (ref 13–17.7)
IMM GRANULOCYTES # BLD AUTO: 0 X10E3/UL (ref 0–0.1)
IMM GRANULOCYTES NFR BLD AUTO: 0 %
LDLC SERPL CALC-MCNC: 104 MG/DL (ref 0–99)
LYMPHOCYTES # BLD AUTO: 3.4 X10E3/UL (ref 0.7–3.1)
LYMPHOCYTES NFR BLD AUTO: 42 %
MCH RBC QN AUTO: 30.7 PG (ref 26.6–33)
MCHC RBC AUTO-ENTMCNC: 33.3 G/DL (ref 31.5–35.7)
MCV RBC AUTO: 92 FL (ref 79–97)
MONOCYTES # BLD AUTO: 0.9 X10E3/UL (ref 0.1–0.9)
MONOCYTES NFR BLD AUTO: 12 %
NEUTROPHILS # BLD AUTO: 3.4 X10E3/UL (ref 1.4–7)
NEUTROPHILS NFR BLD AUTO: 43 %
PLATELET # BLD AUTO: 233 X10E3/UL (ref 150–450)
POTASSIUM SERPL-SCNC: 5.2 MMOL/L (ref 3.5–5.2)
PROT SERPL-MCNC: 8 G/DL (ref 6–8.5)
RBC # BLD AUTO: 4.43 X10E6/UL (ref 4.14–5.8)
SODIUM SERPL-SCNC: 141 MMOL/L (ref 134–144)
TRIGL SERPL-MCNC: 162 MG/DL (ref 0–149)
TSH SERPL DL<=0.005 MIU/L-ACNC: 1.98 UIU/ML (ref 0.45–4.5)
VLDLC SERPL CALC-MCNC: 29 MG/DL (ref 5–40)
WBC # BLD AUTO: 7.9 X10E3/UL (ref 3.4–10.8)

## 2023-04-17 LAB — DRUGS UR: NORMAL

## 2023-08-16 ENCOUNTER — APPOINTMENT (OUTPATIENT)
Dept: GENERAL RADIOLOGY | Facility: HOSPITAL | Age: 83
End: 2023-08-16
Payer: MEDICARE

## 2023-08-16 ENCOUNTER — APPOINTMENT (OUTPATIENT)
Dept: CT IMAGING | Facility: HOSPITAL | Age: 83
End: 2023-08-16
Payer: MEDICARE

## 2023-08-16 ENCOUNTER — HOSPITAL ENCOUNTER (OUTPATIENT)
Facility: HOSPITAL | Age: 83
Setting detail: OBSERVATION
Discharge: HOME | End: 2023-08-18
Attending: EMERGENCY MEDICINE | Admitting: INTERNAL MEDICINE
Payer: MEDICARE

## 2023-08-16 DIAGNOSIS — K86.89 PANCREATIC MASS: Primary | ICD-10-CM

## 2023-08-16 DIAGNOSIS — R17 CHOLESTATIC JAUNDICE: ICD-10-CM

## 2023-08-16 DIAGNOSIS — K86.89 MASS OF PANCREAS: ICD-10-CM

## 2023-08-16 DIAGNOSIS — R53.1 WEAKNESS: ICD-10-CM

## 2023-08-16 DIAGNOSIS — K83.1 BILIARY OBSTRUCTION: ICD-10-CM

## 2023-08-16 DIAGNOSIS — L29.9 PRURITUS: ICD-10-CM

## 2023-08-16 PROBLEM — F43.21 GRIEF: Chronic | Status: ACTIVE | Noted: 2023-08-16

## 2023-08-16 LAB
ALBUMIN SERPL-MCNC: 3.4 G/DL (ref 3.5–5.2)
ALBUMIN/GLOB SERPL: 0.8 G/DL
ALP SERPL-CCNC: 840 U/L (ref 39–117)
ALT SERPL W P-5'-P-CCNC: 82 U/L (ref 1–41)
ANION GAP SERPL CALCULATED.3IONS-SCNC: 14 MMOL/L (ref 5–15)
AST SERPL-CCNC: 83 U/L (ref 1–40)
BACTERIA UR QL AUTO: ABNORMAL /HPF
BASOPHILS # BLD AUTO: 0.04 10*3/MM3 (ref 0–0.2)
BASOPHILS NFR BLD AUTO: 0.7 % (ref 0–1.5)
BILIRUB SERPL-MCNC: 14.2 MG/DL (ref 0–1.2)
BILIRUB UR QL STRIP: NEGATIVE
BUN SERPL-MCNC: 16 MG/DL (ref 8–23)
BUN/CREAT SERPL: 17.8 (ref 7–25)
CALCIUM SPEC-SCNC: 9.7 MG/DL (ref 8.6–10.5)
CHLORIDE SERPL-SCNC: 101 MMOL/L (ref 98–107)
CLARITY UR: ABNORMAL
CO2 SERPL-SCNC: 22 MMOL/L (ref 22–29)
COLOR UR: ABNORMAL
CREAT SERPL-MCNC: 0.9 MG/DL (ref 0.76–1.27)
DEPRECATED RDW RBC AUTO: 48.2 FL (ref 37–54)
EGFRCR SERPLBLD CKD-EPI 2021: 85.8 ML/MIN/1.73
EOSINOPHIL # BLD AUTO: 0.12 10*3/MM3 (ref 0–0.4)
EOSINOPHIL NFR BLD AUTO: 2.2 % (ref 0.3–6.2)
ERYTHROCYTE [DISTWIDTH] IN BLOOD BY AUTOMATED COUNT: 14.4 % (ref 12.3–15.4)
GEN 5 2HR TROPONIN T REFLEX: 19 NG/L
GLOBULIN UR ELPH-MCNC: 4.1 GM/DL
GLUCOSE BLDC GLUCOMTR-MCNC: 162 MG/DL (ref 70–130)
GLUCOSE SERPL-MCNC: 184 MG/DL (ref 65–99)
GLUCOSE UR STRIP-MCNC: ABNORMAL MG/DL
HAV IGM SERPL QL IA: ABNORMAL
HBV CORE IGM SERPL QL IA: ABNORMAL
HBV SURFACE AG SERPL QL IA: ABNORMAL
HCT VFR BLD AUTO: 37.8 % (ref 37.5–51)
HCV AB SER DONR QL: ABNORMAL
HGB BLD-MCNC: 12.8 G/DL (ref 13–17.7)
HGB UR QL STRIP.AUTO: ABNORMAL
HOLD SPECIMEN: NORMAL
HOLD SPECIMEN: NORMAL
HYALINE CASTS UR QL AUTO: ABNORMAL /LPF
IMM GRANULOCYTES # BLD AUTO: 0.02 10*3/MM3 (ref 0–0.05)
IMM GRANULOCYTES NFR BLD AUTO: 0.4 % (ref 0–0.5)
INR PPP: 1.04 (ref 0.9–1.1)
KETONES UR QL STRIP: NEGATIVE
LEUKOCYTE ESTERASE UR QL STRIP.AUTO: ABNORMAL
LYMPHOCYTES # BLD AUTO: 1.46 10*3/MM3 (ref 0.7–3.1)
LYMPHOCYTES NFR BLD AUTO: 27.1 % (ref 19.6–45.3)
MAGNESIUM SERPL-MCNC: 1.8 MG/DL (ref 1.6–2.4)
MCH RBC QN AUTO: 31.1 PG (ref 26.6–33)
MCHC RBC AUTO-ENTMCNC: 33.9 G/DL (ref 31.5–35.7)
MCV RBC AUTO: 92 FL (ref 79–97)
MONOCYTES # BLD AUTO: 0.68 10*3/MM3 (ref 0.1–0.9)
MONOCYTES NFR BLD AUTO: 12.6 % (ref 5–12)
NEUTROPHILS NFR BLD AUTO: 3.07 10*3/MM3 (ref 1.7–7)
NEUTROPHILS NFR BLD AUTO: 57 % (ref 42.7–76)
NITRITE UR QL STRIP: POSITIVE
NRBC BLD AUTO-RTO: 0 /100 WBC (ref 0–0.2)
PH UR STRIP.AUTO: 5.5 [PH] (ref 5–8)
PLATELET # BLD AUTO: 262 10*3/MM3 (ref 140–450)
PMV BLD AUTO: 11.6 FL (ref 6–12)
POTASSIUM SERPL-SCNC: 3.8 MMOL/L (ref 3.5–5.2)
PROT SERPL-MCNC: 7.5 G/DL (ref 6–8.5)
PROT UR QL STRIP: ABNORMAL
PROTHROMBIN TIME: 13.8 SECONDS (ref 11.7–14.2)
RBC # BLD AUTO: 4.11 10*6/MM3 (ref 4.14–5.8)
RBC # UR STRIP: ABNORMAL /HPF
REF LAB TEST METHOD: ABNORMAL
SODIUM SERPL-SCNC: 137 MMOL/L (ref 136–145)
SP GR UR STRIP: 1.02 (ref 1–1.03)
SQUAMOUS #/AREA URNS HPF: ABNORMAL /HPF
TROPONIN T DELTA: -5 NG/L
TROPONIN T SERPL HS-MCNC: 24 NG/L
TROPONIN T SERPL HS-MCNC: 30 NG/L
UROBILINOGEN UR QL STRIP: ABNORMAL
WBC # UR STRIP: ABNORMAL /HPF
WBC NRBC COR # BLD: 5.39 10*3/MM3 (ref 3.4–10.8)
WHOLE BLOOD HOLD COAG: NORMAL
WHOLE BLOOD HOLD SPECIMEN: NORMAL

## 2023-08-16 PROCEDURE — 80074 ACUTE HEPATITIS PANEL: CPT | Performed by: EMERGENCY MEDICINE

## 2023-08-16 PROCEDURE — 84484 ASSAY OF TROPONIN QUANT: CPT

## 2023-08-16 PROCEDURE — 96361 HYDRATE IV INFUSION ADD-ON: CPT

## 2023-08-16 PROCEDURE — G0378 HOSPITAL OBSERVATION PER HR: HCPCS

## 2023-08-16 PROCEDURE — 85610 PROTHROMBIN TIME: CPT | Performed by: EMERGENCY MEDICINE

## 2023-08-16 PROCEDURE — 80053 COMPREHEN METABOLIC PANEL: CPT

## 2023-08-16 PROCEDURE — 93010 ELECTROCARDIOGRAM REPORT: CPT | Performed by: INTERNAL MEDICINE

## 2023-08-16 PROCEDURE — 99284 EMERGENCY DEPT VISIT MOD MDM: CPT

## 2023-08-16 PROCEDURE — 93005 ELECTROCARDIOGRAM TRACING: CPT | Performed by: INTERNAL MEDICINE

## 2023-08-16 PROCEDURE — 99285 EMERGENCY DEPT VISIT HI MDM: CPT

## 2023-08-16 PROCEDURE — 86301 IMMUNOASSAY TUMOR CA 19-9: CPT | Performed by: INTERNAL MEDICINE

## 2023-08-16 PROCEDURE — 93005 ELECTROCARDIOGRAM TRACING: CPT

## 2023-08-16 PROCEDURE — 81001 URINALYSIS AUTO W/SCOPE: CPT

## 2023-08-16 PROCEDURE — 85025 COMPLETE CBC W/AUTO DIFF WBC: CPT

## 2023-08-16 PROCEDURE — 82948 REAGENT STRIP/BLOOD GLUCOSE: CPT

## 2023-08-16 PROCEDURE — 25010000002 SODIUM CHLORIDE 0.9 % WITH KCL 20 MEQ 20-0.9 MEQ/L-% SOLUTION: Performed by: INTERNAL MEDICINE

## 2023-08-16 PROCEDURE — 25510000001 IOPAMIDOL 61 % SOLUTION: Performed by: EMERGENCY MEDICINE

## 2023-08-16 PROCEDURE — 74177 CT ABD & PELVIS W/CONTRAST: CPT

## 2023-08-16 PROCEDURE — 71045 X-RAY EXAM CHEST 1 VIEW: CPT

## 2023-08-16 PROCEDURE — 96360 HYDRATION IV INFUSION INIT: CPT

## 2023-08-16 PROCEDURE — 84484 ASSAY OF TROPONIN QUANT: CPT | Performed by: INTERNAL MEDICINE

## 2023-08-16 PROCEDURE — 83735 ASSAY OF MAGNESIUM: CPT

## 2023-08-16 RX ORDER — CALCIUM CARBONATE 500 MG/1
2 TABLET, CHEWABLE ORAL 2 TIMES DAILY PRN
Status: DISCONTINUED | OUTPATIENT
Start: 2023-08-16 | End: 2023-08-18 | Stop reason: HOSPADM

## 2023-08-16 RX ORDER — MORPHINE SULFATE 2 MG/ML
2 INJECTION, SOLUTION INTRAMUSCULAR; INTRAVENOUS EVERY 4 HOURS PRN
Status: DISCONTINUED | OUTPATIENT
Start: 2023-08-16 | End: 2023-08-18 | Stop reason: HOSPADM

## 2023-08-16 RX ORDER — NALOXONE HCL 0.4 MG/ML
0.4 VIAL (ML) INJECTION
Status: DISCONTINUED | OUTPATIENT
Start: 2023-08-16 | End: 2023-08-18 | Stop reason: HOSPADM

## 2023-08-16 RX ORDER — SODIUM CHLORIDE AND POTASSIUM CHLORIDE 150; 900 MG/100ML; MG/100ML
125 INJECTION, SOLUTION INTRAVENOUS CONTINUOUS
Status: DISPENSED | OUTPATIENT
Start: 2023-08-16 | End: 2023-08-17

## 2023-08-16 RX ORDER — SODIUM CHLORIDE 0.9 % (FLUSH) 0.9 %
10 SYRINGE (ML) INJECTION AS NEEDED
Status: DISCONTINUED | OUTPATIENT
Start: 2023-08-16 | End: 2023-08-18 | Stop reason: HOSPADM

## 2023-08-16 RX ORDER — SODIUM CHLORIDE 9 MG/ML
40 INJECTION, SOLUTION INTRAVENOUS AS NEEDED
Status: DISCONTINUED | OUTPATIENT
Start: 2023-08-16 | End: 2023-08-18 | Stop reason: HOSPADM

## 2023-08-16 RX ORDER — SODIUM CHLORIDE 0.9 % (FLUSH) 0.9 %
10 SYRINGE (ML) INJECTION EVERY 12 HOURS SCHEDULED
Status: DISCONTINUED | OUTPATIENT
Start: 2023-08-16 | End: 2023-08-18 | Stop reason: HOSPADM

## 2023-08-16 RX ORDER — LACTULOSE 10 G/15ML
10 SOLUTION ORAL DAILY
Status: DISCONTINUED | OUTPATIENT
Start: 2023-08-16 | End: 2023-08-18 | Stop reason: HOSPADM

## 2023-08-16 RX ORDER — SODIUM CHLORIDE AND POTASSIUM CHLORIDE 150; 900 MG/100ML; MG/100ML
125 INJECTION, SOLUTION INTRAVENOUS CONTINUOUS
Status: DISCONTINUED | OUTPATIENT
Start: 2023-08-16 | End: 2023-08-16

## 2023-08-16 RX ORDER — FLUOXETINE HYDROCHLORIDE 20 MG/1
40 CAPSULE ORAL DAILY
Status: DISCONTINUED | OUTPATIENT
Start: 2023-08-16 | End: 2023-08-18 | Stop reason: HOSPADM

## 2023-08-16 RX ORDER — ONDANSETRON 2 MG/ML
4 INJECTION INTRAMUSCULAR; INTRAVENOUS EVERY 6 HOURS PRN
Status: DISCONTINUED | OUTPATIENT
Start: 2023-08-16 | End: 2023-08-18 | Stop reason: HOSPADM

## 2023-08-16 RX ORDER — PROPRANOLOL HYDROCHLORIDE 10 MG/1
10 TABLET ORAL 2 TIMES DAILY
Status: DISCONTINUED | OUTPATIENT
Start: 2023-08-16 | End: 2023-08-18 | Stop reason: HOSPADM

## 2023-08-16 RX ORDER — ALPRAZOLAM 0.25 MG/1
0.25 TABLET ORAL 3 TIMES DAILY PRN
Status: DISCONTINUED | OUTPATIENT
Start: 2023-08-16 | End: 2023-08-18 | Stop reason: HOSPADM

## 2023-08-16 RX ORDER — ONDANSETRON 4 MG/1
4 TABLET, FILM COATED ORAL EVERY 6 HOURS PRN
Status: DISCONTINUED | OUTPATIENT
Start: 2023-08-16 | End: 2023-08-18 | Stop reason: HOSPADM

## 2023-08-16 RX ORDER — LACTULOSE 10 G/15ML
10 SOLUTION ORAL 3 TIMES DAILY PRN
Status: DISCONTINUED | OUTPATIENT
Start: 2023-08-16 | End: 2023-08-18 | Stop reason: HOSPADM

## 2023-08-16 RX ORDER — DIPHENHYDRAMINE HCL 25 MG
25 CAPSULE ORAL EVERY 6 HOURS PRN
Status: DISCONTINUED | OUTPATIENT
Start: 2023-08-16 | End: 2023-08-18 | Stop reason: HOSPADM

## 2023-08-16 RX ADMIN — IOPAMIDOL 85 ML: 612 INJECTION, SOLUTION INTRAVENOUS at 13:49

## 2023-08-16 RX ADMIN — Medication 10 ML: at 20:44

## 2023-08-16 RX ADMIN — ALPRAZOLAM 0.25 MG: 0.25 TABLET ORAL at 20:31

## 2023-08-16 RX ADMIN — POTASSIUM CHLORIDE AND SODIUM CHLORIDE 125 ML/HR: 900; 150 INJECTION, SOLUTION INTRAVENOUS at 20:28

## 2023-08-16 RX ADMIN — FLUOXETINE HYDROCHLORIDE 40 MG: 20 CAPSULE ORAL at 20:28

## 2023-08-16 RX ADMIN — LACTULOSE 10 G: 10 SOLUTION ORAL at 20:29

## 2023-08-16 RX ADMIN — PROPRANOLOL HYDROCHLORIDE 10 MG: 10 TABLET ORAL at 20:30

## 2023-08-16 NOTE — ED TRIAGE NOTES
Pt comes in for weakness and tremors for the past week. Pt is also visibly jaundice at triage. Pt denies any abdominal pain or n/v/d.

## 2023-08-16 NOTE — ED NOTES
"Nursing report ED to floor  Jayden Marquez  83 y.o.  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 Diagnoses of Biliary obstruction and Weakness were also pertinent to this visit.    Code status:   Current Code Status       Date Active Code Status Order ID Comments User Context       Not on file            Allergies:   Codeine    Isolation:   No active isolations    Intake and Output  No intake or output data in the 24 hours ending 08/16/23 1511    Weight:       08/16/23  1218   Weight: 65.8 kg (145 lb)       Most recent vitals:   Vitals:    08/16/23 1208 08/16/23 1218 08/16/23 1231   BP:  122/70 130/64   Pulse: 67  62   Resp: 20     Temp: 98.2 øF (36.8 øC)     TempSrc: Tympanic     SpO2: 92%  95%   Weight:  65.8 kg (145 lb)    Height:  180.3 cm (71\")        Active LDAs/IV Access:   Lines, Drains & Airways       Active LDAs       Name Placement date Placement time Site Days    Peripheral IV 08/16/23 1249 Right Antecubital 08/16/23  1249  Antecubital  less than 1                    Labs (abnormal labs have a star):   Labs Reviewed   COMPREHENSIVE METABOLIC PANEL - Abnormal; Notable for the following components:       Result Value    Glucose 184 (*)     Albumin 3.4 (*)     ALT (SGPT) 82 (*)     AST (SGOT) 83 (*)     Alkaline Phosphatase 840 (*)     Total Bilirubin 14.2 (*)     All other components within normal limits    Narrative:     GFR Normal >60  Chronic Kidney Disease <60  Kidney Failure <15    The GFR formula is only valid for adults with stable renal function between ages 18 and 70.   SINGLE HSTROPONIN T - Abnormal; Notable for the following components:    HS Troponin T 30 (*)     All other components within normal limits    Narrative:     High Sensitive Troponin T Reference Range:  <10.0 ng/L- Negative Female for AMI  <15.0 ng/L- Negative Male " for AMI  >=10 - Abnormal Female indicating possible myocardial injury.  >=15 - Abnormal Male indicating possible myocardial injury.   Clinicians would have to utilize clinical acumen, EKG, Troponin, and serial changes to determine if it is an Acute Myocardial Infarction or myocardial injury due to an underlying chronic condition.        URINALYSIS W/ MICROSCOPIC IF INDICATED (NO CULTURE) - Abnormal; Notable for the following components:    Color, UA Dark Yellow (*)     Appearance, UA Cloudy (*)     Glucose,  mg/dL (1+) (*)     Blood, UA Moderate (2+) (*)     Protein,  mg/dL (2+) (*)     Leuk Esterase, UA Small (1+) (*)     Nitrite, UA Positive (*)     All other components within normal limits   CBC WITH AUTO DIFFERENTIAL - Abnormal; Notable for the following components:    RBC 4.11 (*)     Hemoglobin 12.8 (*)     Monocyte % 12.6 (*)     All other components within normal limits   HEPATITIS PANEL, ACUTE - Abnormal; Notable for the following components:    Hep A IgM Equivocal (*)     All other components within normal limits    Narrative:     Unable to determine reactivity. Recommend repeat testing in two weeks.    URINALYSIS, MICROSCOPIC ONLY - Abnormal; Notable for the following components:    RBC, UA 6-12 (*)     WBC, UA 3-5 (*)     Bacteria, UA Trace (*)     All other components within normal limits   POCT GLUCOSE FINGERSTICK - Abnormal; Notable for the following components:    Glucose 162 (*)     All other components within normal limits   MAGNESIUM - Normal   PROTIME-INR - Normal   RAINBOW DRAW    Narrative:     The following orders were created for panel order [facility] Draw.  Procedure                               Abnormality         Status                     ---------                               -----------         ------                     Green Top (Gel)[[id number]]                                  Final result               Lavender Top[[id number]]                                     Final  result               Gold Top - SST[[id number]]                                   Final result               Light Blue Top[[id number]]                                   Final result                 Please view results for these tests on the individual orders.   HEPATITIS A IGM CONFIRMATION   POCT GLUCOSE FINGERSTICK   CBC AND DIFFERENTIAL    Narrative:     The following orders were created for panel order CBC & Differential.  Procedure                               Abnormality         Status                     ---------                               -----------         ------                     CBC Auto Differential[[id number]]        Abnormal            Final result                 Please view results for these tests on the individual orders.   GREEN TOP   LAVENDER TOP   GOLD TOP - SST   LIGHT BLUE TOP       EKG:   ECG 12 Lead ED Triage Standing Order; Weak / Dizzy / AMS   Preliminary Result   HEART RATE= 59  bpm   RR Interval= 1017  ms   DE Interval= 132  ms   P Horizontal Axis= 14  deg   P Front Axis= 80  deg   QRSD Interval= 89  ms   QT Interval= 433  ms   QRS Axis= 62  deg   T Wave Axis= 69  deg   - NORMAL ECG -   Sinus rhythm   Electronically Signed By:    Date and Time of Study: 2023-08-16 12:53:59          Meds given in ED:   Medications   sodium chloride 0.9 % flush 10 mL (has no administration in time range)   iopamidol (ISOVUE-300) 61 % injection 100 mL (85 mL Intravenous Given 8/16/23 1349)       Imaging results:  CT Abdomen Pelvis With Contrast    Result Date: 8/16/2023  Findings on CT are 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 The mass abuts the main portal vein and the proximal superior mesenteric vein. The evaluation of liver parenchyma is somewhat limited secondary to motion artifact. No definite suspicious lesions are delineated.  These findings were discussed with Dr. Villalobos by telephone at the time of dictation.  Radiation dose reduction  techniques were utilized, including automated exposure control and exposure modulation based on body size.       XR Chest 1 View    Result Date: 8/16/2023  No focal pulmonary consolidation. Follow-up as clinical indications persist.  This report was finalized on 8/16/2023 1:36 PM by Dr. Shabbir De Leon M.D.       Ambulatory status:   - up with assist    Social issues:   Social History     Socioeconomic History    Marital status:      Spouse name: Gracia   Tobacco Use    Smoking status: Every Day     Packs/day: 0.25     Years: 50.00     Pack years: 12.50     Types: Cigarettes    Smokeless tobacco: Never   Vaping Use    Vaping Use: Never used   Substance and Sexual Activity    Alcohol use: No    Drug use: No    Sexual activity: Defer       NIH Stroke Scale:       Philomena Cristobal RN  08/16/23 15:11 EDT
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
## 2023-08-17 ENCOUNTER — ANESTHESIA (OUTPATIENT)
Dept: GASTROENTEROLOGY | Facility: HOSPITAL | Age: 83
End: 2023-08-17
Payer: MEDICARE

## 2023-08-17 ENCOUNTER — ANESTHESIA EVENT (OUTPATIENT)
Dept: GASTROENTEROLOGY | Facility: HOSPITAL | Age: 83
End: 2023-08-17
Payer: MEDICARE

## 2023-08-17 ENCOUNTER — APPOINTMENT (OUTPATIENT)
Dept: GENERAL RADIOLOGY | Facility: HOSPITAL | Age: 83
End: 2023-08-17
Payer: MEDICARE

## 2023-08-17 PROBLEM — K83.1 BILIARY OBSTRUCTION: Status: ACTIVE | Noted: 2023-08-16

## 2023-08-17 LAB
ALBUMIN SERPL-MCNC: 3.1 G/DL (ref 3.5–5.2)
ALBUMIN/GLOB SERPL: 0.9 G/DL
ALP SERPL-CCNC: 713 U/L (ref 39–117)
ALT SERPL W P-5'-P-CCNC: 69 U/L (ref 1–41)
ANION GAP SERPL CALCULATED.3IONS-SCNC: 12 MMOL/L (ref 5–15)
AST SERPL-CCNC: 67 U/L (ref 1–40)
BASOPHILS # BLD AUTO: 0.03 10*3/MM3 (ref 0–0.2)
BASOPHILS NFR BLD AUTO: 0.5 % (ref 0–1.5)
BILIRUB SERPL-MCNC: 12.8 MG/DL (ref 0–1.2)
BUN SERPL-MCNC: 12 MG/DL (ref 8–23)
BUN/CREAT SERPL: 15.8 (ref 7–25)
CALCIUM SPEC-SCNC: 9 MG/DL (ref 8.6–10.5)
CANCER AG19-9 SERPL-ACNC: 485 U/ML
CHLORIDE SERPL-SCNC: 105 MMOL/L (ref 98–107)
CO2 SERPL-SCNC: 21 MMOL/L (ref 22–29)
CREAT SERPL-MCNC: 0.76 MG/DL (ref 0.76–1.27)
DEPRECATED RDW RBC AUTO: 45.5 FL (ref 37–54)
EGFRCR SERPLBLD CKD-EPI 2021: 90.3 ML/MIN/1.73
EOSINOPHIL # BLD AUTO: 0.13 10*3/MM3 (ref 0–0.4)
EOSINOPHIL NFR BLD AUTO: 2.1 % (ref 0.3–6.2)
ERYTHROCYTE [DISTWIDTH] IN BLOOD BY AUTOMATED COUNT: 14.3 % (ref 12.3–15.4)
GLOBULIN UR ELPH-MCNC: 3.4 GM/DL
GLUCOSE SERPL-MCNC: 102 MG/DL (ref 65–99)
HCT VFR BLD AUTO: 30.8 % (ref 37.5–51)
HGB BLD-MCNC: 10.5 G/DL (ref 13–17.7)
IMM GRANULOCYTES # BLD AUTO: 0.05 10*3/MM3 (ref 0–0.05)
IMM GRANULOCYTES NFR BLD AUTO: 0.8 % (ref 0–0.5)
INR PPP: 1.15 (ref 0.9–1.1)
LYMPHOCYTES # BLD AUTO: 1.68 10*3/MM3 (ref 0.7–3.1)
LYMPHOCYTES NFR BLD AUTO: 27.6 % (ref 19.6–45.3)
MAGNESIUM SERPL-MCNC: 1.6 MG/DL (ref 1.6–2.4)
MCH RBC QN AUTO: 30.3 PG (ref 26.6–33)
MCHC RBC AUTO-ENTMCNC: 34.1 G/DL (ref 31.5–35.7)
MCV RBC AUTO: 88.8 FL (ref 79–97)
MONOCYTES # BLD AUTO: 1.02 10*3/MM3 (ref 0.1–0.9)
MONOCYTES NFR BLD AUTO: 16.7 % (ref 5–12)
NEUTROPHILS NFR BLD AUTO: 3.18 10*3/MM3 (ref 1.7–7)
NEUTROPHILS NFR BLD AUTO: 52.3 % (ref 42.7–76)
NRBC BLD AUTO-RTO: 0 /100 WBC (ref 0–0.2)
PHOSPHATE SERPL-MCNC: 2.4 MG/DL (ref 2.5–4.5)
PLATELET # BLD AUTO: 215 10*3/MM3 (ref 140–450)
PMV BLD AUTO: 12 FL (ref 6–12)
POTASSIUM SERPL-SCNC: 4 MMOL/L (ref 3.5–5.2)
PROT SERPL-MCNC: 6.5 G/DL (ref 6–8.5)
PROTHROMBIN TIME: 14.9 SECONDS (ref 11.7–14.2)
QT INTERVAL: 433 MS
QT INTERVAL: 439 MS
RBC # BLD AUTO: 3.47 10*6/MM3 (ref 4.14–5.8)
SODIUM SERPL-SCNC: 138 MMOL/L (ref 136–145)
TSH SERPL DL<=0.05 MIU/L-ACNC: 1.29 UIU/ML (ref 0.27–4.2)
WBC NRBC COR # BLD: 6.09 10*3/MM3 (ref 3.4–10.8)

## 2023-08-17 PROCEDURE — 99222 1ST HOSP IP/OBS MODERATE 55: CPT | Performed by: INTERNAL MEDICINE

## 2023-08-17 PROCEDURE — 96361 HYDRATE IV INFUSION ADD-ON: CPT

## 2023-08-17 PROCEDURE — 80053 COMPREHEN METABOLIC PANEL: CPT | Performed by: INTERNAL MEDICINE

## 2023-08-17 PROCEDURE — 83735 ASSAY OF MAGNESIUM: CPT | Performed by: INTERNAL MEDICINE

## 2023-08-17 PROCEDURE — G0378 HOSPITAL OBSERVATION PER HR: HCPCS

## 2023-08-17 PROCEDURE — 88112 CYTOPATH CELL ENHANCE TECH: CPT | Performed by: INTERNAL MEDICINE

## 2023-08-17 PROCEDURE — 25010000002 SODIUM CHLORIDE 0.9 % WITH KCL 20 MEQ 20-0.9 MEQ/L-% SOLUTION: Performed by: INTERNAL MEDICINE

## 2023-08-17 PROCEDURE — C1769 GUIDE WIRE: HCPCS | Performed by: INTERNAL MEDICINE

## 2023-08-17 PROCEDURE — 97162 PT EVAL MOD COMPLEX 30 MIN: CPT

## 2023-08-17 PROCEDURE — 85025 COMPLETE CBC W/AUTO DIFF WBC: CPT | Performed by: INTERNAL MEDICINE

## 2023-08-17 PROCEDURE — 63710000001 DIPHENHYDRAMINE PER 50 MG: Performed by: INTERNAL MEDICINE

## 2023-08-17 PROCEDURE — 97165 OT EVAL LOW COMPLEX 30 MIN: CPT

## 2023-08-17 PROCEDURE — 97110 THERAPEUTIC EXERCISES: CPT

## 2023-08-17 PROCEDURE — C1874 STENT, COATED/COV W/DEL SYS: HCPCS | Performed by: INTERNAL MEDICINE

## 2023-08-17 PROCEDURE — 84100 ASSAY OF PHOSPHORUS: CPT | Performed by: INTERNAL MEDICINE

## 2023-08-17 PROCEDURE — 88305 TISSUE EXAM BY PATHOLOGIST: CPT | Performed by: INTERNAL MEDICINE

## 2023-08-17 PROCEDURE — 74328 X-RAY BILE DUCT ENDOSCOPY: CPT

## 2023-08-17 PROCEDURE — 25010000002 PROPOFOL 10 MG/ML EMULSION: Performed by: STUDENT IN AN ORGANIZED HEALTH CARE EDUCATION/TRAINING PROGRAM

## 2023-08-17 PROCEDURE — 43274 ERCP DUCT STENT PLACEMENT: CPT | Performed by: INTERNAL MEDICINE

## 2023-08-17 PROCEDURE — 97535 SELF CARE MNGMENT TRAINING: CPT

## 2023-08-17 PROCEDURE — 99221 1ST HOSP IP/OBS SF/LOW 40: CPT | Performed by: NURSE PRACTITIONER

## 2023-08-17 PROCEDURE — 85610 PROTHROMBIN TIME: CPT | Performed by: INTERNAL MEDICINE

## 2023-08-17 PROCEDURE — 84443 ASSAY THYROID STIM HORMONE: CPT | Performed by: INTERNAL MEDICINE

## 2023-08-17 PROCEDURE — 25510000001 IOPAMIDOL 61 % SOLUTION: Performed by: INTERNAL MEDICINE

## 2023-08-17 PROCEDURE — 43273 ENDOSCOPIC PANCREATOSCOPY: CPT | Performed by: INTERNAL MEDICINE

## 2023-08-17 DEVICE — STENT SYSTEM RMV
Type: IMPLANTABLE DEVICE | Site: BILE DUCT | Status: FUNCTIONAL
Brand: WALLFLEX BILIARY

## 2023-08-17 RX ORDER — SODIUM CHLORIDE 0.9 % (FLUSH) 0.9 %
10 SYRINGE (ML) INJECTION AS NEEDED
Status: DISCONTINUED | OUTPATIENT
Start: 2023-08-17 | End: 2023-08-17 | Stop reason: HOSPADM

## 2023-08-17 RX ORDER — PROPOFOL 10 MG/ML
VIAL (ML) INTRAVENOUS AS NEEDED
Status: DISCONTINUED | OUTPATIENT
Start: 2023-08-17 | End: 2023-08-17 | Stop reason: SURG

## 2023-08-17 RX ORDER — SODIUM CHLORIDE, SODIUM LACTATE, POTASSIUM CHLORIDE, CALCIUM CHLORIDE 600; 310; 30; 20 MG/100ML; MG/100ML; MG/100ML; MG/100ML
INJECTION, SOLUTION INTRAVENOUS CONTINUOUS PRN
Status: DISCONTINUED | OUTPATIENT
Start: 2023-08-17 | End: 2023-08-17 | Stop reason: SURG

## 2023-08-17 RX ORDER — PROPOFOL 10 MG/ML
VIAL (ML) INTRAVENOUS CONTINUOUS PRN
Status: DISCONTINUED | OUTPATIENT
Start: 2023-08-17 | End: 2023-08-17 | Stop reason: SURG

## 2023-08-17 RX ORDER — SODIUM CHLORIDE 9 MG/ML
1000 INJECTION, SOLUTION INTRAVENOUS CONTINUOUS
Status: DISCONTINUED | OUTPATIENT
Start: 2023-08-17 | End: 2023-08-18 | Stop reason: HOSPADM

## 2023-08-17 RX ORDER — LIDOCAINE HYDROCHLORIDE 20 MG/ML
INJECTION, SOLUTION INFILTRATION; PERINEURAL AS NEEDED
Status: DISCONTINUED | OUTPATIENT
Start: 2023-08-17 | End: 2023-08-17 | Stop reason: SURG

## 2023-08-17 RX ADMIN — POTASSIUM CHLORIDE AND SODIUM CHLORIDE 125 ML/HR: 900; 150 INJECTION, SOLUTION INTRAVENOUS at 04:38

## 2023-08-17 RX ADMIN — PROPRANOLOL HYDROCHLORIDE 10 MG: 10 TABLET ORAL at 09:26

## 2023-08-17 RX ADMIN — Medication 140 MCG/KG/MIN: at 17:10

## 2023-08-17 RX ADMIN — PROPRANOLOL HYDROCHLORIDE 10 MG: 10 TABLET ORAL at 22:54

## 2023-08-17 RX ADMIN — PROPOFOL 40 MG: 10 INJECTION, EMULSION INTRAVENOUS at 17:14

## 2023-08-17 RX ADMIN — Medication 10 ML: at 21:58

## 2023-08-17 RX ADMIN — POTASSIUM CHLORIDE AND SODIUM CHLORIDE 125 ML/HR: 900; 150 INJECTION, SOLUTION INTRAVENOUS at 14:36

## 2023-08-17 RX ADMIN — LIDOCAINE HYDROCHLORIDE 40 MG: 20 INJECTION, SOLUTION INFILTRATION; PERINEURAL at 17:10

## 2023-08-17 RX ADMIN — SODIUM CHLORIDE, POTASSIUM CHLORIDE, SODIUM LACTATE AND CALCIUM CHLORIDE: 600; 310; 30; 20 INJECTION, SOLUTION INTRAVENOUS at 17:05

## 2023-08-17 RX ADMIN — FLUOXETINE HYDROCHLORIDE 40 MG: 20 CAPSULE ORAL at 09:26

## 2023-08-17 RX ADMIN — DIPHENHYDRAMINE HYDROCHLORIDE 25 MG: 25 CAPSULE ORAL at 00:00

## 2023-08-17 RX ADMIN — PROPOFOL 80 MG: 10 INJECTION, EMULSION INTRAVENOUS at 17:10

## 2023-08-17 RX ADMIN — Medication 10 ML: at 09:26
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
## 2023-08-17 NOTE — PLAN OF CARE
Problem: Adult Inpatient Plan of Care  Goal: Plan of Care Review  Outcome: Ongoing, Progressing  Flowsheets (Taken 8/17/2023 0559)  Progress: no change  Plan of Care Reviewed With: patient  Outcome Evaluation: Pt is A&Ox4, pleasant, calm, cooperative, hx L BKA, has prosthetic in room, able to walk pt to bathroom once he donns prosthesis, meds whole w/ water, dentures, IV to R AC, NS w/ 20K, does have a pink, flaky area to L hip, pt states he came in w/ that, and has had that for a long time, but not sure what it is, he is a daily weight, VSS , rested well tonight after taking anxiety medicine and benadryl per pt request.   Goal Outcome Evaluation:  Plan of Care Reviewed With: patient        Progress: no change  Outcome Evaluation: Pt is A&Ox4, pleasant, calm, cooperative, hx L BKA, has prosthetic in room, able to walk pt to bathroom once he donns prosthesis, meds whole w/ water, dentures, IV to R AC, NS w/ 20K, does have a pink, flaky area to L hip, pt states he came in w/ that, and has had that for a long time, but not sure what it is, he is a daily weight, VSS , rested well tonight after taking anxiety medicine and benadryl per pt request.
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
## 2023-08-17 NOTE — CASE MANAGEMENT/SOCIAL WORK
Discharge Planning Assessment  [facility]     Patient Name: Jayden Marquez  MRN: [id number]  Today's Date: 8/17/2023    Admit Date: 8/16/2023    Plan: Home with family to transport.   Discharge Needs Assessment       Row Name 08/17/23 1332       Living Environment    People in Home child(marcos), adult    Current Living Arrangements home    Potentially Unsafe Housing Conditions none    Primary Care Provided by self    Provides Primary Care For no one    Family Caregiver if Needed child(marcos), adult    Family Caregiver Names Malcolm [phone] Son is deaf    Quality of Family Relationships helpful;involved;supportive    Able to Return to Prior Arrangements yes       Resource/Environmental Concerns    Resource/Environmental Concerns none    Transportation Concerns none       Transition Planning    Patient/Family Anticipates Transition to home with family    Patient/Family Anticipated Services at Transition none    Transportation Anticipated family or friend will provide       Discharge Needs Assessment    Readmission Within the Last 30 Days no previous admission in last 30 days    Equipment Currently Used at Home shower chair;grab bar    Concerns to be Addressed denies needs/concerns at this time;discharge planning    Anticipated Changes Related to Illness none    Equipment Needed After Discharge none    Current Discharge Risk other (see comments)    Discharge Coordination/Progress Recently lost spouse of 58 years                   Discharge Plan       Row Name 08/17/23 3778       Plan    Plan Home with family to transport.    Patient/Family in Agreement with Plan yes    Plan Comments Spoke with patient at bedside. Introduced self and explained CCP role. Verified face sheet and local pharmacy is [facility]; enrolled in [facility]. Patient denies difficulty with medication cost/ management. Patient lives single story home with 3 DIVYA, adult son/ Malcolm lives with him as well. Patient denies prior HH and SNF history. Patient has a  shower chair and grab bars. Patient states that he is IADL and still drives occasionally; son help provide transportation as well. Patient plans to return home with family to transport. Patient's spouse recently passed, CCP to have [place] speak with patient.                  Continued Care and Services - Admitted Since 8/16/2023    Coordination has not been started for this encounter.       Expected Discharge Date and Time       Expected Discharge Date Expected Discharge Time    Aug 20, 2023            Demographic Summary       Row Name 08/17/23 1331       General Information    Admission Type inpatient    Required Notices Provided Important Message from Medicare    Referral Source admission list    Reason for Consult discharge planning    Preferred Language English                   Functional Status       Row Name 08/17/23 1332       Functional Status    Usual Activity Tolerance good    Current Activity Tolerance moderate       Functional Status, IADL    Medications independent    Meal Preparation independent    Housekeeping independent    Laundry independent    Shopping independent       Mental Status    General Appearance WDL WDL       Mental Status Summary    Recent Changes in Mental Status/Cognitive Functioning no changes       Employment/    Employment Status retired                   Psychosocial    No documentation.                  Abuse/Neglect    No documentation.                  Legal    No documentation.                  Substance Abuse    No documentation.                  Patient Forms    No documentation.                     Kim Martínez RN

## 2023-08-17 NOTE — ANESTHESIA POSTPROCEDURE EVALUATION
"Patient: Jayden Marquez    Procedure Summary       Date: 08/17/23 Room / Location:  [facility] /  [facility]    Anesthesia Start: 1705 Anesthesia Stop: 1749    Procedure: ENDOSCOPIC RETROGRADE CHOLANGIOPANCREATOGRAPHY with sphincterotomy, needle knife, Brushing, Biliary stent placement Diagnosis:       Biliary obstruction      Mass of pancreas      Pruritus      Cholestatic jaundice      (Biliary obstruction [K83.1])      (Mass of pancreas [K86.89])      (Pruritus [L29.9])      (Cholestatic jaundice [R17])    Surgeons: Maikol Baum MD Provider: Bulmaro Hill MD    Anesthesia Type: MAC ASA Status: 3            Anesthesia Type: MAC    Vitals  No vitals data found for the desired time range.          Post Anesthesia Care and Evaluation    Patient location during evaluation: PACU  Patient participation: complete - patient participated  Level of consciousness: awake and alert  Pain management: adequate    Airway patency: patent  Anesthetic complications: No anesthetic complications  PONV Status: controlled  Cardiovascular status: acceptable and hemodynamically stable  Respiratory status: acceptable  Hydration status: acceptable    Comments: /78 (BP Location: Left arm, Patient Position: Lying)   Pulse 60   Temp 36.3 øC (97.3 øF) (Oral)   Resp 18   Ht 180.3 cm (71\")   Wt 65.8 kg (145 lb 1 oz)   SpO2 95%   BMI 20.23 kg/mý     "
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
## 2023-08-17 NOTE — PLAN OF CARE
Goal Outcome Evaluation:  Plan of Care Reviewed With: patient           Outcome Evaluation: Pt admit from home with jaundice and weakness.  Pt lives at home with son who assists at times with ADL. Pt uses prothesis at baseline for ambulation and reports son walks alongside  him at times and for shower xfer. Pt seen by OT and is able to michi prosthesis, walk to BR and sit up in chair with SBA.   OT ed on benefit of activity and encourages pt to cont up with nursing to walk to BR and sit up in chair.  OT discusses mobility status with both RN and aid.   Pt with functional B UE and feels at baseline.  Will sign off at this time for OT.      Anticipated Discharge Disposition (OT): home with 24/7 care

## 2023-08-17 NOTE — ANESTHESIA PREPROCEDURE EVALUATION
Anesthesia Evaluation     Patient summary reviewed   no history of anesthetic complications:                Airway   Mallampati: II  TM distance: >3 FB  Neck ROM: full  Dental - normal exam     Pulmonary    (+) COPD,  Cardiovascular     ECG reviewed    (+) hyperlipidemia      Neuro/Psych  (+) psychiatric history Anxiety and Depression  GI/Hepatic/Renal/Endo      Musculoskeletal     Abdominal    Substance History      OB/GYN          Other      history of cancer active    ROS/Med Hx Other: 2.1 cm pancreatic mass involving the head of the pancreas and causing intra and extrahepatic biliary ductal dilatation secondary to CBD obstruction, pancreatic ductal dilatation                  Anesthesia Plan    ASA 3     MAC   total IV anesthesia  (I have reviewed the patient's history with the patient and the chart, including all pertinent laboratory results and imaging. I have explained the risks of anesthesia including but not limited to dental damage, corneal abrasion, nerve injury, MI, stroke, and death. Questions asked and answered. Anesthetic plan discussed with patient and team as indicated. Patient expressed understanding of the above.  )    Anesthetic plan, risks, benefits, and alternatives have been provided, discussed and informed consent has been obtained with: patient.      CODE STATUS:    Medical Intervention Limits: NO intubation (DNI)  Code Status (Patient has no pulse and is not breathing): No CPR (Do Not Attempt to Resuscitate)  Medical Interventions (Patient has pulse or is breathing): Limited Support

## 2023-08-17 NOTE — PLAN OF CARE
Goal Outcome Evaluation:  Plan of Care Reviewed With: patient, family        Progress: no change  Outcome Evaluation: AOx4. Pt ambulating with SBA; uses L-prosthesis when OOB. IVF infusing at 125mL/hr. NPO since 1400 for ERCP; currently off unit for procedure at this time.

## 2023-08-17 NOTE — PROGRESS NOTES
Name: Jayden Marquez ADMIT: 2023   : 1940  PCP: Federico García MD    MRN: [id number] LOS: 1 days   AGE/SEX: 83 y.o. male  ROOM: [facility]   Subjective   Chief Complaint   Patient presents with    Weakness - Generalized     84 yo who presents with painless jaundice and found to have pancreatic mass  On fluids  Had CT yesterday  Denies pain  Wife recently passed away       Objective   Vital Signs  Temp:  [98.1 øF (36.7 øC)-98.5 øF (36.9 øC)] 98.1 øF (36.7 øC)  Heart Rate:  [61-64] 62  Resp:  [20] 20  BP: (122-157)/(64-75) 134/66  SpO2:  [95 %-98 %] 98 %  on   ;   Device (Oxygen Therapy): room air  Body mass index is 20.23 kg/mý.    Physical Exam  HENT:      Head: Normocephalic and atraumatic.   Eyes:      General: Scleral icterus present.   Cardiovascular:      Rate and Rhythm: Normal rate and regular rhythm.      Heart sounds: Normal heart sounds.   Pulmonary:      Effort: Pulmonary effort is normal. No respiratory distress.      Breath sounds: Normal breath sounds.   Abdominal:      General: There is no distension.      Palpations: Abdomen is soft.      Tenderness: There is no abdominal tenderness.   Musculoskeletal:      Cervical back: Neck supple.   Skin:     Coloration: Skin is jaundiced.   Neurological:      Mental Status: He is alert.   Psychiatric:         Behavior: Behavior normal.       Results Review:       I reviewed the patient's new clinical results.  Results from last 7 days   Lab Units 23  0547 23  1254   WBC 10*3/mm3 6.09 5.39   HEMOGLOBIN g/dL 10.5* 12.8*   PLATELETS 10*3/mm3 215 262     Results from last 7 days   Lab Units 23  0547 23  1254   SODIUM mmol/L 138 137   POTASSIUM mmol/L 4.0 3.8   CHLORIDE mmol/L 105 101   CO2 mmol/L 21.0* 22.0   BUN mg/dL 12 16   CREATININE mg/dL 0.76 0.90   GLUCOSE mg/dL 102* 184*   Estimated Creatinine Clearance: 70.9 mL/min (by C-G formula based on SCr of 0.76 mg/dL).  Results from last 7 days   Lab Units 23  0519  08/16/23  1254   ALBUMIN g/dL 3.1* 3.4*   BILIRUBIN mg/dL 12.8* 14.2*   ALK PHOS U/L 713* 840*   AST (SGOT) U/L 67* 83*   ALT (SGPT) U/L 69* 82*     Results from last 7 days   Lab Units 08/17/23  0547 08/16/23  1254   CALCIUM mg/dL 9.0 9.7   ALBUMIN g/dL 3.1* 3.4*   MAGNESIUM mg/dL 1.6 1.8   PHOSPHORUS mg/dL 2.4*  --          Coag   Results from last 7 days   Lab Units 08/17/23  0547 08/16/23  1254   INR  1.15* 1.04     HbA1C No results found for: HGBA1C  Infection     Radiology(recent) CT Abdomen Pelvis With Contrast    Result Date: 8/17/2023  Findings on CT are 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 The mass abuts the main portal vein and the proximal superior mesenteric vein. The evaluation of liver parenchyma is somewhat limited secondary to motion artifact. No definite suspicious lesions are delineated.  These findings were discussed with Dr. Hanks by telephone at the time of dictation.  Radiation dose reduction techniques were utilized, including automated exposure control and exposure modulation based on body size.   This report was finalized on 8/17/2023 9:14 AM by Dr. Alvarez Jennings M.D.      XR Chest 1 View    Result Date: 8/16/2023  No focal pulmonary consolidation. Follow-up as clinical indications persist.  This report was finalized on 8/16/2023 1:36 PM by Dr. Shabbir De Leon M.D.     HS Troponin T   Date Value Ref Range Status   08/16/2023 19 (H) <15 ng/L Final   08/16/2023 24 (H) <15 ng/L Final     No components found for: TSH;2    FLUoxetine, 40 mg, Oral, Daily  lactulose, 10 g, Oral, Daily  propranolol, 10 mg, Oral, BID  sodium chloride, 10 mL, Intravenous, Q12H      sodium chloride 0.9 % with KCl 20 mEq, 125 mL/hr, Last Rate: 125 mL/hr (08/17/23 0438)    Diet: Liquid Diets; Clear Liquid; Texture: Regular Texture (IDDSI 7); Fluid Consistency: Thin (IDDSI 0)  NPO Diet NPO Type: Sips with Meds      Assessment & Plan      Active Hospital  Problems    Diagnosis  POA    **Mass of pancreas [K86.89]  Yes    Grief- lost wife aug 2023 [F43.21]  Yes    Pruritus [L29.9]  Yes    Cholestatic jaundice [R17]  Yes    Neuropathy [G62.9]  Yes    S/P BKA (below knee amputation), left [Z89.512]  Not Applicable    Coarse tremors [G25.2]  Yes    Anxiety [F41.9]  Yes    MGUS (monoclonal gammopathy of unknown significance) [D47.2]  Yes    Memory change [R41.3]  Yes      Resolved Hospital Problems   No resolved problems to display.     82 yo who presents with painless jaundice and found to have pancreatic mass    GI and Oncology evaluation. Potential ERCP will see what GI says  Monitor labs  Gentle fluids  Counseling with recent death of spouse    Code status- dw pt he wishes for DNR/DNI which is reasonable.       ROX staff  Reviewed records      Jr Wolfe MD  [facility]  08/17/23  12:08 EDT

## 2023-08-18 ENCOUNTER — APPOINTMENT (OUTPATIENT)
Dept: GENERAL RADIOLOGY | Facility: HOSPITAL | Age: 83
End: 2023-08-18
Payer: MEDICARE

## 2023-08-18 VITALS
TEMPERATURE: 97.5 F | DIASTOLIC BLOOD PRESSURE: 73 MMHG | WEIGHT: 146.39 LBS | RESPIRATION RATE: 16 BRPM | SYSTOLIC BLOOD PRESSURE: 142 MMHG | HEART RATE: 60 BPM | HEIGHT: 71 IN | OXYGEN SATURATION: 96 % | BODY MASS INDEX: 20.49 KG/M2

## 2023-08-18 LAB
ALBUMIN SERPL-MCNC: 3.1 G/DL (ref 3.5–5.2)
ALBUMIN/GLOB SERPL: 0.8 G/DL
ALP SERPL-CCNC: 707 U/L (ref 39–117)
ALT SERPL W P-5'-P-CCNC: 67 U/L (ref 1–41)
ANION GAP SERPL CALCULATED.3IONS-SCNC: 12.2 MMOL/L (ref 5–15)
AST SERPL-CCNC: 77 U/L (ref 1–40)
BASOPHILS # BLD AUTO: 0.02 10*3/MM3 (ref 0–0.2)
BASOPHILS NFR BLD AUTO: 0.2 % (ref 0–1.5)
BILIRUB SERPL-MCNC: 11.9 MG/DL (ref 0–1.2)
BUN SERPL-MCNC: 8 MG/DL (ref 8–23)
BUN/CREAT SERPL: 11.8 (ref 7–25)
CALCIUM SPEC-SCNC: 9.1 MG/DL (ref 8.6–10.5)
CHLORIDE SERPL-SCNC: 100 MMOL/L (ref 98–107)
CO2 SERPL-SCNC: 20.8 MMOL/L (ref 22–29)
CREAT SERPL-MCNC: 0.68 MG/DL (ref 0.76–1.27)
DEPRECATED RDW RBC AUTO: 49.1 FL (ref 37–54)
EGFRCR SERPLBLD CKD-EPI 2021: 93.4 ML/MIN/1.73
EOSINOPHIL # BLD AUTO: 0.03 10*3/MM3 (ref 0–0.4)
EOSINOPHIL NFR BLD AUTO: 0.3 % (ref 0.3–6.2)
ERYTHROCYTE [DISTWIDTH] IN BLOOD BY AUTOMATED COUNT: 14.5 % (ref 12.3–15.4)
GLOBULIN UR ELPH-MCNC: 3.8 GM/DL
GLUCOSE SERPL-MCNC: 117 MG/DL (ref 65–99)
HCT VFR BLD AUTO: 33.4 % (ref 37.5–51)
HGB BLD-MCNC: 11.4 G/DL (ref 13–17.7)
IMM GRANULOCYTES # BLD AUTO: 0.03 10*3/MM3 (ref 0–0.05)
IMM GRANULOCYTES NFR BLD AUTO: 0.3 % (ref 0–0.5)
LYMPHOCYTES # BLD AUTO: 1.72 10*3/MM3 (ref 0.7–3.1)
LYMPHOCYTES NFR BLD AUTO: 18.9 % (ref 19.6–45.3)
MCH RBC QN AUTO: 31.4 PG (ref 26.6–33)
MCHC RBC AUTO-ENTMCNC: 34.1 G/DL (ref 31.5–35.7)
MCV RBC AUTO: 92 FL (ref 79–97)
MONOCYTES # BLD AUTO: 1.04 10*3/MM3 (ref 0.1–0.9)
MONOCYTES NFR BLD AUTO: 11.4 % (ref 5–12)
NEUTROPHILS NFR BLD AUTO: 6.25 10*3/MM3 (ref 1.7–7)
NEUTROPHILS NFR BLD AUTO: 68.9 % (ref 42.7–76)
NRBC BLD AUTO-RTO: 0 /100 WBC (ref 0–0.2)
PLATELET # BLD AUTO: 212 10*3/MM3 (ref 140–450)
PMV BLD AUTO: 11.5 FL (ref 6–12)
POTASSIUM SERPL-SCNC: 3.6 MMOL/L (ref 3.5–5.2)
PROT SERPL-MCNC: 6.9 G/DL (ref 6–8.5)
RBC # BLD AUTO: 3.63 10*6/MM3 (ref 4.14–5.8)
SODIUM SERPL-SCNC: 133 MMOL/L (ref 136–145)
WBC NRBC COR # BLD: 9.09 10*3/MM3 (ref 3.4–10.8)

## 2023-08-18 PROCEDURE — 25010000002 MORPHINE PER 10 MG: Performed by: INTERNAL MEDICINE

## 2023-08-18 PROCEDURE — 25010000002 ONDANSETRON PER 1 MG: Performed by: INTERNAL MEDICINE

## 2023-08-18 PROCEDURE — 85025 COMPLETE CBC W/AUTO DIFF WBC: CPT | Performed by: INTERNAL MEDICINE

## 2023-08-18 PROCEDURE — G0378 HOSPITAL OBSERVATION PER HR: HCPCS

## 2023-08-18 PROCEDURE — 99214 OFFICE O/P EST MOD 30 MIN: CPT | Performed by: NURSE PRACTITIONER

## 2023-08-18 PROCEDURE — 97530 THERAPEUTIC ACTIVITIES: CPT

## 2023-08-18 PROCEDURE — 97110 THERAPEUTIC EXERCISES: CPT

## 2023-08-18 PROCEDURE — 80053 COMPREHEN METABOLIC PANEL: CPT | Performed by: INTERNAL MEDICINE

## 2023-08-18 PROCEDURE — 74022 RADEX COMPL AQT ABD SERIES: CPT

## 2023-08-18 PROCEDURE — 99232 SBSQ HOSP IP/OBS MODERATE 35: CPT | Performed by: INTERNAL MEDICINE

## 2023-08-18 RX ORDER — OXYCODONE HYDROCHLORIDE 5 MG/1
5 TABLET ORAL EVERY 4 HOURS PRN
Status: DISCONTINUED | OUTPATIENT
Start: 2023-08-18 | End: 2023-08-18 | Stop reason: HOSPADM

## 2023-08-18 RX ORDER — PANTOPRAZOLE SODIUM 40 MG/1
40 TABLET, DELAYED RELEASE ORAL
Status: DISCONTINUED | OUTPATIENT
Start: 2023-08-18 | End: 2023-08-18 | Stop reason: HOSPADM

## 2023-08-18 RX ORDER — ONDANSETRON 4 MG/1
4 TABLET, FILM COATED ORAL EVERY 6 HOURS PRN
Qty: 20 TABLET | Refills: 0 | Status: SHIPPED | OUTPATIENT
Start: 2023-08-18

## 2023-08-18 RX ORDER — METOCLOPRAMIDE 5 MG/1
5 TABLET ORAL
Qty: 30 TABLET | Refills: 0 | Status: SHIPPED | OUTPATIENT
Start: 2023-08-18

## 2023-08-18 RX ORDER — LACTULOSE 10 G/15ML
10 SOLUTION ORAL; RECTAL 3 TIMES DAILY PRN
Qty: 473 ML | Refills: 0 | Status: SHIPPED | OUTPATIENT
Start: 2023-08-18

## 2023-08-18 RX ORDER — PANTOPRAZOLE SODIUM 40 MG/1
40 TABLET, DELAYED RELEASE ORAL
Qty: 30 TABLET | Refills: 0 | Status: SHIPPED | OUTPATIENT
Start: 2023-08-18

## 2023-08-18 RX ORDER — NALOXONE HYDROCHLORIDE 4 MG/.1ML
SPRAY NASAL
Qty: 2 EACH | Refills: 0 | Status: SHIPPED | OUTPATIENT
Start: 2023-08-18

## 2023-08-18 RX ORDER — PANCRELIPASE 24000; 76000; 120000 [USP'U]/1; [USP'U]/1; [USP'U]/1
24000 CAPSULE, DELAYED RELEASE PELLETS ORAL
Qty: 90 CAPSULE | Refills: 2 | Status: SHIPPED | OUTPATIENT
Start: 2023-08-18

## 2023-08-18 RX ORDER — METOCLOPRAMIDE 5 MG/1
5 TABLET ORAL
Status: DISCONTINUED | OUTPATIENT
Start: 2023-08-18 | End: 2023-08-18 | Stop reason: HOSPADM

## 2023-08-18 RX ORDER — POTASSIUM CHLORIDE 750 MG/1
40 TABLET, FILM COATED, EXTENDED RELEASE ORAL EVERY 4 HOURS
Status: COMPLETED | OUTPATIENT
Start: 2023-08-18 | End: 2023-08-18

## 2023-08-18 RX ORDER — OXYCODONE HYDROCHLORIDE 5 MG/1
5 TABLET ORAL EVERY 6 HOURS PRN
Qty: 8 TABLET | Refills: 0 | Status: SHIPPED | OUTPATIENT
Start: 2023-08-18

## 2023-08-18 RX ADMIN — ONDANSETRON 4 MG: 2 INJECTION INTRAMUSCULAR; INTRAVENOUS at 00:53

## 2023-08-18 RX ADMIN — FLUOXETINE HYDROCHLORIDE 40 MG: 20 CAPSULE ORAL at 08:19

## 2023-08-18 RX ADMIN — POTASSIUM CHLORIDE 40 MEQ: 750 TABLET, EXTENDED RELEASE ORAL at 14:29

## 2023-08-18 RX ADMIN — MORPHINE SULFATE 2 MG: 2 INJECTION, SOLUTION INTRAMUSCULAR; INTRAVENOUS at 08:27

## 2023-08-18 RX ADMIN — PROPRANOLOL HYDROCHLORIDE 10 MG: 10 TABLET ORAL at 08:19

## 2023-08-18 RX ADMIN — PANTOPRAZOLE SODIUM 40 MG: 40 TABLET, DELAYED RELEASE ORAL at 14:30

## 2023-08-18 RX ADMIN — Medication 10 ML: at 08:20

## 2023-08-18 RX ADMIN — LACTULOSE 10 G: 10 SOLUTION ORAL at 08:21

## 2023-08-18 RX ADMIN — POTASSIUM CHLORIDE 40 MEQ: 750 TABLET, EXTENDED RELEASE ORAL at 10:31

## 2023-08-18 NOTE — PROGRESS NOTES
REASON FOR FOLLOWUP/CHIEF COMPLAINT:   Possible pancreatic     HISTORY OF PRESENT ILLNESS:   He had an ERCP performed 8/17/2023 with obstruction of the CBD.  Brush cytology performed and stent placed.  Patient reports feeling better today.  He is eager to be discharged home.  Cytology still pending      Past Medical History, Past Surgical History, Social History, Family History have been reviewed and are without significant changes except as mentioned.    Review of Systems   Review of Systems  Review of systems as mentioned in the HPI otherwise negative    Medications:  The current medication list was reviewed in the EMR    ALLERGIES:    Allergies   Allergen Reactions    Codeine Nausea Only              Vitals:    08/17/23 1759 08/17/23 2108 08/18/23 0545 08/18/23 0730   BP:  152/72 159/77 155/74   BP Location:  Left arm Left arm Left arm   Patient Position:  Lying Lying Lying   Pulse:  63 68 66   Resp:  18 18 16   Temp: 97.6 øF (36.4 øC) 98.5 øF (36.9 øC) 98.2 øF (36.8 øC) 97.4 øF (36.3 øC)   TempSrc: Oral Oral Oral Oral   SpO2:  100% 97% 96%   Weight:   66.4 kg (146 lb 6.2 oz)    Height:         Physical Exam    CONSTITUTIONAL:  Vital signs reviewed.  Jaundiced and ill-appearing.  EYES:  .  Icterus present  EARS,NOSE,MOUTH,THROAT:  Ears and nose appear unremarkable.  Lips, teeth, gums appear unremarkable.  RESPIRATORY:  Normal respiratory effort.  Lungs clear to auscultation bilaterally.  CARDIOVASCULAR:  Normal S1, S2.  No murmurs rubs or gallops.  No significant lower extremity edema.  GASTROINTESTINAL: Abdomen appears unremarkable.  Nontender.  No hepatomegaly.  No splenomegaly.  NEURO: cranial nerves 2-12 grossly intact.  No focal deficits.  Appears to have equal strength all 4 extremities.  MUSCULOSKELETAL: Prosthetic limb  SKIN:  Warm.  No rashes.  PSYCHIATRIC:  Normal judgment and insight.  Normal mood and affect.       RECENT LABS:  WBC   Date Value Ref Range Status   08/18/2023 9.09 3.40 - 10.80  10*3/mm3 Final   08/17/2023 6.09 3.40 - 10.80 10*3/mm3 Final   08/16/2023 5.39 3.40 - 10.80 10*3/mm3 Final     Hemoglobin   Date Value Ref Range Status   08/18/2023 11.4 (L) 13.0 - 17.7 g/dL Final   08/17/2023 10.5 (L) 13.0 - 17.7 g/dL Final   08/16/2023 12.8 (L) 13.0 - 17.7 g/dL Final     Platelets   Date Value Ref Range Status   08/18/2023 212 140 - 450 10*3/mm3 Final   08/17/2023 215 140 - 450 10*3/mm3 Final   08/16/2023 262 140 - 450 10*3/mm3 Final       ASSESSMENT/PLAN:  Jayden Marquez P383/1     Pancreatic mass  CT of the abdomen from 8/16/2023 with a 2.1 cm pancreatic mass involving the head of the pancreas and causing intra and extrahepatic biliary ductal dilatation secondary to CBD obstruction, pancreatic ductal dilatation.  This is resulting in hyperbilirubinemia with a total bilirubin of 12.8  Presentation is highly concerning for malignancy  Will require tissue diagnosis to confirm the same  CA 19-9 can be obtained but not reliable due to biliary obstruction  I discussed with the patient at bedside that the mass is highly concerning for pancreatic cancer.  He is agreeable to undergo diagnostic work-up however he does not want any chemotherapy or radiation if this turns out to be malignant.  ERCP 8/17/2023 with CBD obstruction, cytology pending.  Stent placed     *Hyperbilirubinemia  Total bilirubin elevated at 12.8  Acute hepatitis panel with hepatitis A IgM equivocal  Hepatitis A IgM confirmatory test pending  Secondary to CBD obstruction from the pancreatic mass  Stent placed 8/17/2023  Bilirubin 11.9     *Elevated troponin  Troponin elevated to 30 on presentation, decreased to 19 on 8/17/2023  Trending down  Continue to monitor     *MGUS  Seen by Dr. Landon in 2021  Patient refused to follow-up     *Abnormal UA  Asymptomatic  Not on any antibiotic     *Tremors  On propanolol     *Recommendations  Follow-up on cytology  Patient does not wish any treatment if this turns out to be pancreatic  cancer  DNR/DNI  If patient is discharged from the hospital he will require a follow-up in our office for discussion of pathology and further management  We will continue to follow the

## 2023-08-18 NOTE — NURSING NOTE
Discharge paperwork reviewed with patient and family member. Follow-up appointments acknowledged. Patient leaving via PV.
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
## 2023-08-18 NOTE — PLAN OF CARE
Goal Outcome Evaluation:  Plan of Care Reviewed With: patient        Progress: no change          VSS. BKA, up with assist. PRN zofran given, nauseous x1.

## 2023-08-18 NOTE — PROGRESS NOTES
[facility]  Inpatient Progress Note    Reason for Follow Up: Obstructive jaundice    Subjective     Interval History:     Patient underwent ERCP yesterday for obstructive jaundice with biliary tract obstruction secondary to what appeared to be a mass found in the lower third of the main duct.  There was ductal dilation present secondary to stricture.  Sphincterotomy performed.  Cells for cytology obtained with 1 covered metal biliary stent placed in the common bile duct.  CA 19-9 485.    Path pending.    Reviewed labs this morning - , AST 77, ALT 67, Tbil 11.9.     Oncology is now also following the patient.  Review of notes patient does not wish any treatment if cytology turns out to be pancreatic cancer.    Patient currently denies abdominal pain or nausea.  He did have an episode of vomiting this morning after he attempted to swallow potassium tablets.  He does deny dysphagia or odynophagia.  He ate breakfast without difficulty.  Abdominal x-ray this morning (reviewed by Dr. Baum) with normal findings except for gas however there is residual food in the stomach.  He remains jaundiced.  Denies diarrhea this morning.      Current Facility-Administered Medications:     ALPRAZolam (XANAX) tablet 0.25 mg, 0.25 mg, Oral, TID PRN, Maikol Baum MD, 0.25 mg at 08/16/23 2031    calcium carbonate (TUMS) chewable tablet 500 mg (200 mg elemental), 2 tablet, Oral, BID PRN, Maikol Baum MD    Calcium Replacement - Follow Nurse / BPA Driven Protocol, , Does not apply, PRNSarika Mitchell C., MD    diphenhydrAMINE (BENADRYL) capsule 25 mg, 25 mg, Oral, Q6H PRN, Maikol Baum MD, 25 mg at 08/17/23 0000    FLUoxetine (PROzac) capsule 40 mg, 40 mg, Oral, Daily, Maikol Baum MD, 40 mg at 08/18/23 0819    lactulose (CHRONULAC) 10 GM/15ML solution 10 g, 10 g, Oral, Daily, Maikol Baum MD, 10 g at 08/18/23 0821    lactulose (CHRONULAC) 10 GM/15ML solution 10 g,  10 g, Oral, TID PRN, Maikol Baum MD    Magnesium Standard Dose Replacement - Follow Nurse / BPA Driven Protocol, , Does not apply, Sarika LORENZ Mitchell C., MD    morphine injection 2 mg, 2 mg, Intravenous, Q4H PRN, 2 mg at 08/18/23 0827 **AND** naloxone (NARCAN) injection 0.4 mg, 0.4 mg, Intravenous, Q5 Min PRN, Maikol Baum MD    ondansetron (ZOFRAN) tablet 4 mg, 4 mg, Oral, Q6H PRN **OR** ondansetron (ZOFRAN) injection 4 mg, 4 mg, Intravenous, Q6H PRN, Maikol Baum MD, 4 mg at 08/18/23 0053    oxyCODONE (ROXICODONE) immediate release tablet 5 mg, 5 mg, Oral, Q4H PRN, Jr Wolfe MD    Phosphorus Replacement - Follow Nurse / BPA Driven Protocol, , Does not apply, Sarika LORENZ Mitchell C., MD    potassium chloride (K-DUR,KLOR-CON) ER tablet 40 mEq, 40 mEq, Oral, Q4H, Jr Wolfe MD, 40 mEq at 08/18/23 1031    Potassium Replacement - Follow Nurse / BPA Driven Protocol, , Does not apply, Sarika LORENZ Mitchell C., MD    propranolol (INDERAL) tablet 10 mg, 10 mg, Oral, BID, aMikol Baum MD, 10 mg at 08/18/23 0819    sodium chloride 0.9 % flush 10 mL, 10 mL, Intravenous, PRN, Maikol Baum MD    sodium chloride 0.9 % flush 10 mL, 10 mL, Intravenous, Q12H, Maikol Baum MD, 10 mL at 08/18/23 0820    sodium chloride 0.9 % flush 10 mL, 10 mL, Intravenous, PRN, Maikol Baum MD    sodium chloride 0.9 % infusion 1,000 mL, 1,000 mL, Intravenous, Continuous, Maikol Baum MD    sodium chloride 0.9 % infusion 40 mL, 40 mL, Intravenous, PRN, Maikol Baum MD  Review of Systems:    All systems were reviewed and negative except for:  Gastrointestinal: positive for  jaundice    Objective     Vital Signs  Temp:  [97.3 øF (36.3 øC)-98.5 øF (36.9 øC)] 97.4 øF (36.3 øC)  Heart Rate:  [59-68] 66  Resp:  [16-20] 16  BP: (112-173)/(58-79) 155/74  Body mass index is 20.42 kg/mý.  No intake or output data in the 24 hours ending 08/18/23 1132  No  intake/output data recorded.     Physical Exam:   General: patient awake, alert and cooperative, + jaundice   Abdomen: soft, nontender, nondistended; normal bowel sounds     Results Review:     I reviewed the patient's new clinical results.    Results from last 7 days   Lab Units 08/18/23  0725 08/17/23  0547 08/16/23  1254   WBC 10*3/mm3 9.09 6.09 5.39   HEMOGLOBIN g/dL 11.4* 10.5* 12.8*   HEMATOCRIT % 33.4* 30.8* 37.8   PLATELETS 10*3/mm3 212 215 262     Results from last 7 days   Lab Units 08/18/23  0725 08/17/23  0547 08/16/23  1254   SODIUM mmol/L 133* 138 137   POTASSIUM mmol/L 3.6 4.0 3.8   CHLORIDE mmol/L 100 105 101   CO2 mmol/L 20.8* 21.0* 22.0   BUN mg/dL 8 12 16   CREATININE mg/dL 0.68* 0.76 0.90   CALCIUM mg/dL 9.1 9.0 9.7   BILIRUBIN mg/dL 11.9* 12.8* 14.2*   ALK PHOS U/L 707* 713* 840*   ALT (SGPT) U/L 67* 69* 82*   AST (SGOT) U/L 77* 67* 83*   GLUCOSE mg/dL 117* 102* 184*     Results from last 7 days   Lab Units 08/17/23  0547 08/16/23  1254   INR  1.15* 1.04     Lab Results   Lab Value Date/Time    LIPASE 82 (H) 09/13/2016 1341       Radiology:  XR Abdomen 2+ VW with Chest 1 VW   Final Result      FL ercp biliary duct only   Final Result      CT Abdomen Pelvis With Contrast   Final Result   Findings on CT are most suspicious for pancreatic head neoplasm with   biliary and pancreatic ductal dilatation. The mass abuts the main portal   vein and the proximal superior mesenteric vein. The evaluation of liver   parenchyma is somewhat limited secondary to motion artifact. No definite   suspicious lesions are delineated.       These findings were discussed with Dr. Hanks by telephone at the time   of dictation.       Radiation dose reduction techniques were utilized, including automated   exposure control and exposure modulation based on body size.           This report was finalized on 8/17/2023 9:14 AM by Dr. Alvarez Jennings M.D.          XR Chest 1 View   Final Result   No focal pulmonary  consolidation. Follow-up as clinical   indications persist.       This report was finalized on 8/16/2023 1:36 PM by Dr. Shabbir De Leon M.D.              Assessment & Plan     Active Hospital Problems    Diagnosis     **Mass of pancreas     Grief- lost wife aug 2023     Pruritus     Cholestatic jaundice     Biliary obstruction     Neuropathy     S/P BKA (below knee amputation), left     Coarse tremors     Anxiety     MGUS (monoclonal gammopathy of unknown significance)     Memory change        Assessment:  Obstructive jaundice  Pancreatic mass  Hyperbilirubinemia and elevated liver function test  Status post ERCP with stent placement  History of ulcerative colitis    Plan:  Discussed plan of care with Dr. Baum after we reviewed abdominal x-ray from this morning.  Would recommend 5 mg of Reglan 3 times daily prior to meals given residual food seen on imaging in the stomach.  Patient will need to begin Protonix 40 mg once daily and would recommend he go home on Creon which I have ordered.  If he tolerates lunch well he could potentially be discharged home if okay with hospitalist service.  We will follow-up on cytology as an outpatient.     I discussed the patients findings and my recommendations with Dr. Baum, the patient and nursing staff.    RAYMOND De La Fuente
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
## 2023-08-18 NOTE — PLAN OF CARE
Goal Outcome Evaluation:  Plan of Care Reviewed With: patient           Outcome Evaluation: Improved activiyt tolerance during PT today.  Able to ambulate 100' w/ CGA cues for posture, recommend using walker due to residual limb circumference loss - has socks at home.  Pt reports son that lives w/ him is able to assist with everything.  Recommend home PT.      Anticipated Discharge Disposition (PT): home with assist, home with home health

## 2023-08-21 ENCOUNTER — TELEPHONE (OUTPATIENT)
Dept: ONCOLOGY | Facility: CLINIC | Age: 83
End: 2023-08-21
Payer: MEDICARE

## 2023-08-21 LAB
CYTO UR: NORMAL
LAB AP CASE REPORT: NORMAL
LAB AP DIAGNOSIS COMMENT: NORMAL
PATH REPORT.FINAL DX SPEC: NORMAL
PATH REPORT.GROSS SPEC: NORMAL

## 2023-08-21 NOTE — TELEPHONE ENCOUNTER
Caller: Jayden Marquez    Relationship to patient: Self    Best call back number: [phone]    Patient is needing: TO SCHEDULE HOSPITAL F/U APPT.

## 2023-08-22 NOTE — CASE MANAGEMENT/SOCIAL WORK
Case Management Discharge Note      Final Note: Discharged home. Orders reviewed, no further needs.         Selected Continued Care - Discharged on 8/18/2023 Admission date: 8/16/2023 - Discharge disposition: Home or Self Care      Destination    No services have been selected for the patient.                Durable Medical Equipment    No services have been selected for the patient.                Dialysis/Infusion    No services have been selected for the patient.                Home Medical Care    No services have been selected for the patient.                Therapy    No services have been selected for the patient.                Community Resources    No services have been selected for the patient.                Community & DME    No services have been selected for the patient.                         Final Discharge Disposition Code: 01 - home or self-care

## 2023-08-23 ENCOUNTER — TELEPHONE (OUTPATIENT)
Dept: GASTROENTEROLOGY | Facility: CLINIC | Age: 83
End: 2023-08-23

## 2023-08-23 ENCOUNTER — TELEPHONE (OUTPATIENT)
Dept: GASTROENTEROLOGY | Facility: CLINIC | Age: 83
End: 2023-08-23
Payer: MEDICARE

## 2023-08-23 NOTE — TELEPHONE ENCOUNTER
Hub staff attempted to follow warm transfer process and was unsuccessful     Caller: Jayden Marquez    Relationship to patient: Self    Best call back number: [phone]     Patient is needing: PT RETURNING PHONE CALL FROM CAIO LIRA. PLEASE CALL PT BACK AND ADVISE. WARM TRANSFER UNSUCCESSFUL.

## 2023-08-23 NOTE — TELEPHONE ENCOUNTER
Patient called back and confirmed his appointment for tomorrow with Dr. Baum. Appointment with Cathie cancelled for next week.

## 2023-08-23 NOTE — TELEPHONE ENCOUNTER
As per order of Dr. Baum: schedule follow up visit with me.   Patient called no answer. Left message his appointment has been changed from next week to tomorrow 8/24@5760.   Requested call back to confirm.

## 2023-08-28 ENCOUNTER — LAB (OUTPATIENT)
Dept: LAB | Facility: HOSPITAL | Age: 83
End: 2023-08-28
Payer: MEDICARE

## 2023-08-28 ENCOUNTER — OFFICE VISIT (OUTPATIENT)
Dept: ONCOLOGY | Facility: CLINIC | Age: 83
End: 2023-08-28
Payer: MEDICARE

## 2023-08-28 VITALS
BODY MASS INDEX: 18.83 KG/M2 | WEIGHT: 134.5 LBS | OXYGEN SATURATION: 98 % | TEMPERATURE: 98.2 F | HEART RATE: 61 BPM | DIASTOLIC BLOOD PRESSURE: 77 MMHG | RESPIRATION RATE: 18 BRPM | HEIGHT: 71 IN | SYSTOLIC BLOOD PRESSURE: 148 MMHG

## 2023-08-28 DIAGNOSIS — K86.89 PANCREATIC MASS: Primary | ICD-10-CM

## 2023-08-28 DIAGNOSIS — K86.89 PANCREATIC MASS: ICD-10-CM

## 2023-08-28 LAB
ALBUMIN SERPL-MCNC: 3.3 G/DL (ref 3.5–5.2)
ALBUMIN/GLOB SERPL: 0.8 G/DL
ALP SERPL-CCNC: 377 U/L (ref 39–117)
ALT SERPL W P-5'-P-CCNC: 42 U/L (ref 1–41)
ANION GAP SERPL CALCULATED.3IONS-SCNC: 10.8 MMOL/L (ref 5–15)
AST SERPL-CCNC: 46 U/L (ref 1–40)
BASOPHILS # BLD AUTO: 0.05 10*3/MM3 (ref 0–0.2)
BASOPHILS NFR BLD AUTO: 0.7 % (ref 0–1.5)
BILIRUB SERPL-MCNC: 3.9 MG/DL (ref 0–1.2)
BUN SERPL-MCNC: 14 MG/DL (ref 8–23)
BUN/CREAT SERPL: 19.2 (ref 7–25)
CALCIUM SPEC-SCNC: 9.3 MG/DL (ref 8.6–10.5)
CHLORIDE SERPL-SCNC: 103 MMOL/L (ref 98–107)
CO2 SERPL-SCNC: 23.2 MMOL/L (ref 22–29)
CREAT SERPL-MCNC: 0.73 MG/DL (ref 0.7–1.3)
DEPRECATED RDW RBC AUTO: 55.8 FL (ref 37–54)
EGFRCR SERPLBLD CKD-EPI 2021: 91.4 ML/MIN/1.73
EOSINOPHIL # BLD AUTO: 0.15 10*3/MM3 (ref 0–0.4)
EOSINOPHIL NFR BLD AUTO: 2.1 % (ref 0.3–6.2)
ERYTHROCYTE [DISTWIDTH] IN BLOOD BY AUTOMATED COUNT: 15.9 % (ref 12.3–15.4)
GLOBULIN UR ELPH-MCNC: 4.2 GM/DL
GLUCOSE SERPL-MCNC: 145 MG/DL (ref 65–99)
HCT VFR BLD AUTO: 36.9 % (ref 37.5–51)
HGB BLD-MCNC: 12.3 G/DL (ref 13–17.7)
IMM GRANULOCYTES # BLD AUTO: 0.03 10*3/MM3 (ref 0–0.05)
IMM GRANULOCYTES NFR BLD AUTO: 0.4 % (ref 0–0.5)
LYMPHOCYTES # BLD AUTO: 1.74 10*3/MM3 (ref 0.7–3.1)
LYMPHOCYTES NFR BLD AUTO: 24 % (ref 19.6–45.3)
MCH RBC QN AUTO: 31.8 PG (ref 26.6–33)
MCHC RBC AUTO-ENTMCNC: 33.3 G/DL (ref 31.5–35.7)
MCV RBC AUTO: 95.3 FL (ref 79–97)
MONOCYTES # BLD AUTO: 0.95 10*3/MM3 (ref 0.1–0.9)
MONOCYTES NFR BLD AUTO: 13.1 % (ref 5–12)
NEUTROPHILS NFR BLD AUTO: 4.34 10*3/MM3 (ref 1.7–7)
NEUTROPHILS NFR BLD AUTO: 59.7 % (ref 42.7–76)
NRBC BLD AUTO-RTO: 0 /100 WBC (ref 0–0.2)
PLATELET # BLD AUTO: 302 10*3/MM3 (ref 140–450)
PMV BLD AUTO: 10.2 FL (ref 6–12)
POTASSIUM SERPL-SCNC: 4.4 MMOL/L (ref 3.5–5.2)
PROT SERPL-MCNC: 7.5 G/DL (ref 6–8.5)
RBC # BLD AUTO: 3.87 10*6/MM3 (ref 4.14–5.8)
SODIUM SERPL-SCNC: 137 MMOL/L (ref 136–145)
WBC NRBC COR # BLD: 7.26 10*3/MM3 (ref 3.4–10.8)

## 2023-08-28 PROCEDURE — 85025 COMPLETE CBC W/AUTO DIFF WBC: CPT

## 2023-08-28 PROCEDURE — 80053 COMPREHEN METABOLIC PANEL: CPT

## 2023-08-28 PROCEDURE — 36415 COLL VENOUS BLD VENIPUNCTURE: CPT

## 2023-08-28 NOTE — PROGRESS NOTES
"        REASON FOR FOLLOWUP/CHIEF COMPLAINT:   Possible pancreatic     HISTORY OF PRESENT ILLNESS:   He had an ERCP performed 8/17/2023 with obstruction of the CBD.  Brush cytology performed and stent placed.  Cytology was consistent with adenocarcinoma.  Patient presents to the clinic today accompanied by his son to discuss the pathology and further recommendations.  He reports feeling better.  No abdominal pain nausea vomiting.  He did have some diarrhea with the medications which were prescribed in the hospital and hence he discontinued taking them and he reports feeling better.    Past Medical History, Past Surgical History, Social History, Family History have been reviewed and are without significant changes except as mentioned.    Review of Systems   Review of Systems  Review of systems as mentioned in the HPI otherwise negative    Medications:  The current medication list was reviewed in the EMR    ALLERGIES:    Allergies   Allergen Reactions    Codeine Nausea Only              Vitals:    08/28/23 1104   BP: 148/77   Pulse: 61   Resp: 18   Temp: 98.2 øF (36.8 øC)   TempSrc: Temporal   SpO2: 98%   Weight: 61 kg (134 lb 8 oz)   Height: 180.3 cm (70.98\")   PainSc: 0-No pain     Physical Exam    CONSTITUTIONAL:  Vital signs reviewed.  The jaundice is improved  EYES:  .  Icterus improved  EARS,NOSE,MOUTH,THROAT:  Ears and nose appear unremarkable.  Lips, teeth, gums appear unremarkable.  RESPIRATORY:  Normal respiratory effort.  Lungs clear to auscultation bilaterally.  CARDIOVASCULAR:  Normal S1, S2.  No murmurs rubs or gallops.  No significant lower extremity edema.  GASTROINTESTINAL: Abdomen appears unremarkable.  Nontender.  No hepatomegaly.  No splenomegaly.  NEURO: cranial nerves 2-12 grossly intact.  No focal deficits.  Appears to have equal strength all 4 extremities.  MUSCULOSKELETAL: Prosthetic limb  SKIN:  Warm.  No rashes.  PSYCHIATRIC:  Normal judgment and insight.  Normal mood and affect.     I have " reexamined the patient and the results are consistent with the previously documented exam. Ana Chambers MD      RECENT LABS:  WBC   Date Value Ref Range Status   08/28/2023 7.26 3.40 - 10.80 10*3/mm3 Final     Hemoglobin   Date Value Ref Range Status   08/28/2023 12.3 (L) 13.0 - 17.7 g/dL Final     Platelets   Date Value Ref Range Status   08/28/2023 302 140 - 450 10*3/mm3 Final       ASSESSMENT/PLAN:  Jayden Marquez Room/bed info not found     Pancreatic mass  CT of the abdomen from 8/16/2023 with a 2.1 cm pancreatic mass involving the head of the pancreas and causing intra and extrahepatic biliary ductal dilatation secondary to CBD obstruction, pancreatic ductal dilatation.  This is resulting in hyperbilirubinemia with a total bilirubin of 12.8  Presentation is highly concerning for malignancy  Will require tissue diagnosis to confirm the same  CA 19-9 can be obtained but not reliable due to biliary obstruction  I discussed with the patient at bedside that the mass is highly concerning for pancreatic cancer.  He is agreeable to undergo diagnostic work-up however he does not want any chemotherapy or radiation if this turns out to be malignant.  ERCP 8/17/2023 with CBD obstruction, cytology suggestive of adenocarcinoma.  Stent placed.  Reviewed the pathology with the patient today.  When his age I do not think he would be a candidate for Whipple's.  However we discussed possible radiation and chemotherapy.  Patient adamantly refuses any sort of treatment at this time.  He does not wish to undergo any chemotherapy or radiation.  Offered referral to radiation oncology but patient refused.  He wishes to be enrolled in hospice   Hospice referral will be placed today       *Hyperbilirubinemia  Total bilirubin elevated at 12.8  Acute hepatitis panel with hepatitis A IgM equivocal  Hepatitis A IgM confirmatory test pending  Secondary to CBD obstruction from the pancreatic mass  Stent placed 8/17/2023  Bilirubin has  improved to three-point     *Elevated troponin  Troponin elevated to 30 on presentation, decreased to 19 on 8/17/2023  It was downtrending in the hospital  No further intervention     *MGUS  Seen by Dr. Landon in 2021  Patient refused to follow-up     *Abnormal UA  Asymptomatic  Not treated     *Tremors  On propanolol  Continue the same     *Recommendations  He continues to refuse any sort of treatment for pancreatic cancer  Today I went over the possibility of progression and metastasis and getting symptomatic from the same however patient still does not want treatment.  DNR/DNI  Referral to hospice placed today.    Patient has pancreatic adenocarcinoma which is newly diagnosed and likely terminal due to lack of treatment, referral to hospice made today.  40 minutes spent on encounter today.

## 2023-10-14 NOTE — BRIEF OP NOTE
ENDOSCOPIC RETROGRADE CHOLANGIOPANCREATOGRAPHY  Progress Note    Jayden Marquez  8/17/2023    Pre-op Diagnosis:   Biliary obstruction [K83.1]  Mass of pancreas [K86.89]  Pruritus [L29.9]  Cholestatic jaundice [R17]       Post-Op Diagnosis Codes:     * Biliary obstruction [K83.1]     * Mass of pancreas [K86.89]     * Pruritus [L29.9]     * Cholestatic jaundice [R17]    Procedure/CPTr Codes:        Procedure(s):  ENDOSCOPIC RETROGRADE CHOLANGIOPANCREATOGRAPHY with sphincterotomy, needle knife, Brushing, Biliary stent placement              Surgeon(s):  Maikol Baum MD    Anesthesia: Monitored Anesthesia Care    Staff:   Radiology Technologist: Kristopher Barrera  Endo Technician: Heidi Pereyra RN  Endo Nurse: Mary Santos RN         Estimated Blood Loss: minimal    Urine Voided: * No values recorded between 8/17/2023  5:04 PM and 8/17/2023  5:42 PM *    Specimens:                A: Bile duct brushing          Drains: * No LDAs found *    Findings: ERCP with sphincterotomy brush cytology stent placement performed.  Unable to cannulate through the native ampulla.  Needle-knife sphincterotomy performed to access biliary tree.  High-grade obstruction found in the distal bile duct with marked dilation of the common duct cystic duct with partial filling of the gallbladder.  Brush cytology performed through the strictured segment and a 10 mm x 6 cm covered Wallstent was then inserted with excellent drain identified.  Patient tolerated well sent to recovery.        Complications: None          Maikol Baum MD     Date: 8/17/2023  Time: 17:47 EDT         Single

## 2023-12-26 ENCOUNTER — HOSPITAL ENCOUNTER (EMERGENCY)
Facility: HOSPITAL | Age: 83
Discharge: HOME | End: 2023-12-26
Attending: STUDENT IN AN ORGANIZED HEALTH CARE EDUCATION/TRAINING PROGRAM | Admitting: STUDENT IN AN ORGANIZED HEALTH CARE EDUCATION/TRAINING PROGRAM
Payer: MEDICARE

## 2023-12-26 ENCOUNTER — APPOINTMENT (OUTPATIENT)
Dept: CT IMAGING | Facility: HOSPITAL | Age: 83
End: 2023-12-26
Payer: MEDICARE

## 2023-12-26 VITALS
WEIGHT: 134 LBS | DIASTOLIC BLOOD PRESSURE: 87 MMHG | BODY MASS INDEX: 18.76 KG/M2 | HEIGHT: 71 IN | SYSTOLIC BLOOD PRESSURE: 144 MMHG | RESPIRATION RATE: 18 BRPM | HEART RATE: 106 BPM | OXYGEN SATURATION: 93 % | TEMPERATURE: 97.9 F

## 2023-12-26 DIAGNOSIS — C25.9 MALIGNANT NEOPLASM OF PANCREAS, UNSPECIFIED LOCATION OF MALIGNANCY: ICD-10-CM

## 2023-12-26 DIAGNOSIS — K31.1 GASTRIC OUTLET OBSTRUCTION: Primary | ICD-10-CM

## 2023-12-26 LAB
ALBUMIN SERPL-MCNC: 4.3 G/DL (ref 3.5–5.2)
ALBUMIN/GLOB SERPL: 0.8 G/DL
ALP SERPL-CCNC: 180 U/L (ref 39–117)
ALT SERPL W P-5'-P-CCNC: 19 U/L (ref 1–41)
ANION GAP SERPL CALCULATED.3IONS-SCNC: 17 MMOL/L (ref 5–15)
AST SERPL-CCNC: 18 U/L (ref 1–40)
BASOPHILS # BLD AUTO: 0.04 10*3/MM3 (ref 0–0.2)
BASOPHILS NFR BLD AUTO: 0.5 % (ref 0–1.5)
BILIRUB SERPL-MCNC: 0.5 MG/DL (ref 0–1.2)
BUN SERPL-MCNC: 18 MG/DL (ref 8–23)
BUN/CREAT SERPL: 14.4 (ref 7–25)
CALCIUM SPEC-SCNC: 9.9 MG/DL (ref 8.6–10.5)
CHLORIDE SERPL-SCNC: 91 MMOL/L (ref 98–107)
CO2 SERPL-SCNC: 34 MMOL/L (ref 22–29)
CREAT SERPL-MCNC: 1.25 MG/DL (ref 0.76–1.27)
D-LACTATE SERPL-SCNC: 3.7 MMOL/L (ref 0.5–2)
DEPRECATED RDW RBC AUTO: 39.2 FL (ref 37–54)
EGFRCR SERPLBLD CKD-EPI 2021: 57.9 ML/MIN/1.73
EOSINOPHIL # BLD AUTO: 0.01 10*3/MM3 (ref 0–0.4)
EOSINOPHIL NFR BLD AUTO: 0.1 % (ref 0.3–6.2)
ERYTHROCYTE [DISTWIDTH] IN BLOOD BY AUTOMATED COUNT: 12.2 % (ref 12.3–15.4)
GLOBULIN UR ELPH-MCNC: 5.1 GM/DL
GLUCOSE SERPL-MCNC: 268 MG/DL (ref 65–99)
HCT VFR BLD AUTO: 42.4 % (ref 37.5–51)
HGB BLD-MCNC: 14.2 G/DL (ref 13–17.7)
HOLD SPECIMEN: NORMAL
HOLD SPECIMEN: NORMAL
IMM GRANULOCYTES # BLD AUTO: 0.02 10*3/MM3 (ref 0–0.05)
IMM GRANULOCYTES NFR BLD AUTO: 0.3 % (ref 0–0.5)
LIPASE SERPL-CCNC: 40 U/L (ref 13–60)
LYMPHOCYTES # BLD AUTO: 1.37 10*3/MM3 (ref 0.7–3.1)
LYMPHOCYTES NFR BLD AUTO: 17.9 % (ref 19.6–45.3)
MCH RBC QN AUTO: 30.3 PG (ref 26.6–33)
MCHC RBC AUTO-ENTMCNC: 33.5 G/DL (ref 31.5–35.7)
MCV RBC AUTO: 90.4 FL (ref 79–97)
MONOCYTES # BLD AUTO: 0.86 10*3/MM3 (ref 0.1–0.9)
MONOCYTES NFR BLD AUTO: 11.2 % (ref 5–12)
NEUTROPHILS NFR BLD AUTO: 5.36 10*3/MM3 (ref 1.7–7)
NEUTROPHILS NFR BLD AUTO: 70 % (ref 42.7–76)
NRBC BLD AUTO-RTO: 0 /100 WBC (ref 0–0.2)
PLATELET # BLD AUTO: 283 10*3/MM3 (ref 140–450)
PMV BLD AUTO: 9.6 FL (ref 6–12)
POTASSIUM SERPL-SCNC: 3.5 MMOL/L (ref 3.5–5.2)
PROT SERPL-MCNC: 9.4 G/DL (ref 6–8.5)
RBC # BLD AUTO: 4.69 10*6/MM3 (ref 4.14–5.8)
SODIUM SERPL-SCNC: 142 MMOL/L (ref 136–145)
WBC NRBC COR # BLD AUTO: 7.66 10*3/MM3 (ref 3.4–10.8)
WHOLE BLOOD HOLD COAG: NORMAL
WHOLE BLOOD HOLD SPECIMEN: NORMAL

## 2023-12-26 PROCEDURE — 25010000002 ONDANSETRON PER 1 MG: Performed by: STUDENT IN AN ORGANIZED HEALTH CARE EDUCATION/TRAINING PROGRAM

## 2023-12-26 PROCEDURE — 74177 CT ABD & PELVIS W/CONTRAST: CPT

## 2023-12-26 PROCEDURE — 85025 COMPLETE CBC W/AUTO DIFF WBC: CPT

## 2023-12-26 PROCEDURE — 83690 ASSAY OF LIPASE: CPT

## 2023-12-26 PROCEDURE — 83605 ASSAY OF LACTIC ACID: CPT

## 2023-12-26 PROCEDURE — 96374 THER/PROPH/DIAG INJ IV PUSH: CPT

## 2023-12-26 PROCEDURE — 99285 EMERGENCY DEPT VISIT HI MDM: CPT

## 2023-12-26 PROCEDURE — 80053 COMPREHEN METABOLIC PANEL: CPT

## 2023-12-26 PROCEDURE — 36415 COLL VENOUS BLD VENIPUNCTURE: CPT

## 2023-12-26 PROCEDURE — 25810000003 LACTATED RINGERS SOLUTION: Performed by: STUDENT IN AN ORGANIZED HEALTH CARE EDUCATION/TRAINING PROGRAM

## 2023-12-26 PROCEDURE — 25510000001 IOPAMIDOL 61 % SOLUTION: Performed by: STUDENT IN AN ORGANIZED HEALTH CARE EDUCATION/TRAINING PROGRAM

## 2023-12-26 RX ORDER — ONDANSETRON 2 MG/ML
4 INJECTION INTRAMUSCULAR; INTRAVENOUS ONCE
Status: COMPLETED | OUTPATIENT
Start: 2023-12-26 | End: 2023-12-26

## 2023-12-26 RX ORDER — PROMETHAZINE HYDROCHLORIDE 25 MG/1
25 SUPPOSITORY RECTAL EVERY 6 HOURS PRN
Qty: 30 SUPPOSITORY | Refills: 0 | Status: SHIPPED | OUTPATIENT
Start: 2023-12-26

## 2023-12-26 RX ORDER — SODIUM CHLORIDE 0.9 % (FLUSH) 0.9 %
10 SYRINGE (ML) INJECTION AS NEEDED
Status: DISCONTINUED | OUTPATIENT
Start: 2023-12-26 | End: 2023-12-27 | Stop reason: HOSPADM

## 2023-12-26 RX ADMIN — IOPAMIDOL 85 ML: 612 INJECTION, SOLUTION INTRAVENOUS at 19:47

## 2023-12-26 RX ADMIN — ONDANSETRON HYDROCHLORIDE 4 MG: 2 INJECTION, SOLUTION INTRAMUSCULAR; INTRAVENOUS at 19:09

## 2023-12-26 RX ADMIN — SODIUM CHLORIDE, POTASSIUM CHLORIDE, SODIUM LACTATE AND CALCIUM CHLORIDE 1000 ML: 600; 310; 30; 20 INJECTION, SOLUTION INTRAVENOUS at 19:07
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
## 2024-02-01 LAB — REF LAB TEST METHOD: NORMAL

## 2024-11-13 NOTE — TELEPHONE ENCOUNTER
----- Message from Simin Swanson sent at 7/24/2020  1:27 PM EDT -----  Regarding: MED   Contact: [phone]  PT HAS QUESTIONS ABOUT MEDICATION THAT WAS GIVING TO HIM YESTERDAY   
Called pt and pt is asking if he is to continuing taking his mesalamine and cortenema's?  Also pt reports that he picked up both the budesonide and the prednisone.     He is asking if he is to take both of these.  Advised pt if he has the budesonide to only take it . Advised to not take prednisone. Pt verb understanding.     Update sent to Lubna SMITH.   
General
Lumbar Puncture
Lumbar Puncture

## (undated) DEVICE — MSK PROC CURAPLEX O2 2/ADAPT 7FT

## (undated) DEVICE — SINGLE USE DISTAL COVER MAJ-2315: Brand: SINGLE USE DISTAL COVER

## (undated) DEVICE — SENSR O2 OXIMAX FNGR A/ 18IN NONSTR

## (undated) DEVICE — ERBE NESSY®PLATE 170 SPLIT; 168CM²; CABLE 3M: Brand: ERBE

## (undated) DEVICE — TUBING, SUCTION, 1/4" X 10', STRAIGHT: Brand: MEDLINE

## (undated) DEVICE — SINGLE-USE BIOPSY FORCEPS: Brand: RADIAL JAW 4

## (undated) DEVICE — Device

## (undated) DEVICE — DEV LK WIREGUIDE FUSN OLYMP SCP

## (undated) DEVICE — Device: Brand: DEFENDO AIR/WATER/SUCTION AND BIOPSY VALVE

## (undated) DEVICE — CANN NASL CO2 TRULINK W/O2 A/

## (undated) DEVICE — SPHINCTEROTOME: Brand: HYDRATOME RX 44

## (undated) DEVICE — LN SMPL CO2 SHTRM SD STREAM W/M LUER

## (undated) DEVICE — CANN O2 ETCO2 FITS ALL CONN CO2 SMPL A/ 7IN DISP LF

## (undated) DEVICE — WIREGUIDED CYTOLOGY BRUSH: Brand: RX CYTOLOGY BRUSH

## (undated) DEVICE — THE TORRENT IRRIGATION SCOPE CONNECTOR IS USED WITH THE TORRENT IRRIGATION TUBING TO PROVIDE IRRIGATION FLUIDS SUCH AS STERILE WATER DURING GASTROINTESTINAL ENDOSCOPIC PROCEDURES WHEN USED IN CONJUNCTION WITH AN IRRIGATION PUMP (OR ELECTROSURGICAL UNIT).: Brand: TORRENT

## (undated) DEVICE — BITEBLOCK OMNI BLOC

## (undated) DEVICE — TRIPLE LUMEN NEEDLE KNIFE: Brand: RX NEEDLE KNIFE XL

## (undated) DEVICE — ADAPT CLN BIOGUARD AIR/H2O DISP

## (undated) DEVICE — KT ORCA ORCAPOD DISP STRL